# Patient Record
Sex: FEMALE | Race: WHITE | Employment: FULL TIME | URBAN - METROPOLITAN AREA
[De-identification: names, ages, dates, MRNs, and addresses within clinical notes are randomized per-mention and may not be internally consistent; named-entity substitution may affect disease eponyms.]

---

## 2017-02-14 ENCOUNTER — TELEPHONE (OUTPATIENT)
Dept: FAMILY MEDICINE | Facility: CLINIC | Age: 47
End: 2017-02-14

## 2017-02-14 NOTE — TELEPHONE ENCOUNTER
[facility] called asking if levothyroxine prescription was intentional or if still required DENIS.     Prescription sent in for Synthroid 12/1. Looks like request for lower cost generic sent in 12/15 and prescription for levothyroxine sent in on 12/16/2016.    Spoke with patient to confirm request for generic and no longer needs brand name.    Spoke with pharmacy and confirmed prescription sent 12/16/2016 to be current order.    dc'd Synthroid from med list.    Eda Sparks RN

## 2017-04-28 ENCOUNTER — TRANSFERRED RECORDS (OUTPATIENT)
Dept: HEALTH INFORMATION MANAGEMENT | Facility: CLINIC | Age: 47
End: 2017-04-28

## 2017-04-28 LAB
CHOLEST SERPL-MCNC: 206 MG/DL (ref 125–199)
GLUCOSE SERPL-MCNC: 87 MG/DL (ref 65–99)
HBA1C MFR BLD: 5 % (ref 4–5.6)
HDLC SERPL-MCNC: 73 MG/DL
LDLC SERPL CALC-MCNC: 122 MG/DL
TRIGL SERPL-MCNC: 56 MG/DL

## 2017-05-01 ENCOUNTER — OFFICE VISIT (OUTPATIENT)
Dept: ENDOCRINOLOGY | Facility: CLINIC | Age: 47
End: 2017-05-01
Payer: COMMERCIAL

## 2017-05-01 VITALS
SYSTOLIC BLOOD PRESSURE: 120 MMHG | BODY MASS INDEX: 29.63 KG/M2 | RESPIRATION RATE: 14 BRPM | WEIGHT: 162 LBS | TEMPERATURE: 98.7 F | HEART RATE: 85 BPM | DIASTOLIC BLOOD PRESSURE: 80 MMHG

## 2017-05-01 DIAGNOSIS — E06.3 HYPOTHYROIDISM DUE TO HASHIMOTO'S THYROIDITIS: ICD-10-CM

## 2017-05-01 DIAGNOSIS — Z13.6 CARDIOVASCULAR SCREENING; LDL GOAL LESS THAN 160: ICD-10-CM

## 2017-05-01 DIAGNOSIS — Z83.3 FAMILY HISTORY OF DIABETES MELLITUS: ICD-10-CM

## 2017-05-01 PROCEDURE — 99214 OFFICE O/P EST MOD 30 MIN: CPT | Performed by: CLINICAL NURSE SPECIALIST

## 2017-05-01 RX ORDER — DIPHENOXYLATE HYDROCHLORIDE AND ATROPINE SULFATE 2.5; .025 MG/1; MG/1
1 TABLET ORAL DAILY
COMMUNITY
Start: 2015-02-17 | End: 2022-04-22

## 2017-05-01 RX ORDER — PHENTERMINE HYDROCHLORIDE 37.5 MG/1
37.5 TABLET ORAL
Qty: 31 TABLET | Refills: 2 | Status: ON HOLD | OUTPATIENT
Start: 2017-05-01 | End: 2017-11-03

## 2017-05-01 NOTE — PROGRESS NOTES
Name: Sha Vidal  Seen at the request of Anita Quiros for hypothyroidism and obesity (Last seen 12/2/2016).  HPI:  Sha Vidal is a 47 year old female who presents for the follow up of hypothyroidism and weight gain/difficulty with weight loss.  She was diagnosed with Hashimoto's 15+ years ago.  Has been treated with low dose Levothyroxine since that time.  Prior to this, she weighed 120 lbs.  She started gaining weight at the time of diagnosis and has continued to gain even with diet efforts.  She was previously seen at [facility] until her insurance coverage changed.  Briefly tried Fenphen many years ago - only took for 3 months and tolerated it well, ? Weight loss while taking it.    Started Phentermine 8/4/2016.    Menses: regular, 3 children - no problems conceiving or with pregnancy/delivery.  Second marriage was unable to conceive - this was just prior to diagnosis of Hashimoto's so thought it was due to the thyroid condition.  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  Use of Steroids:No  Family history of Obesity:Yes: mother  Diet: Yes, watching/limiting intake  Exercise:Yes: walking every other day, takes the dogs out   History of low vitamin D - takes MVI in the summer, adds supplemental D 1000 IU daily in the winter  PMH/PSH:  Past Medical History:   Diagnosis Date     Allergic rhinitis, cause unspecified      CARDIOVASCULAR SCREENING; LDL GOAL LESS THAN 160 10/31/2010     Hashimoto's thyroiditis      Lateral epicondylitis 3/13/2009     Migraine headache 2/19/2013     NONSPECIFIC MEDICAL HISTORY 2002    Factor XII deficiency (asymptomatic)     NONSPECIFIC MEDICAL HISTORY 2002    GRAYSON positive 1:320, seen by Rheum (negative PHUONG eval)     Past Surgical History:   Procedure Laterality Date     C NONSPECIFIC  PROCEDURE      wisdom teeth extraction     C NONSPECIFIC PROCEDURE      Bilateral tubal ligation     C NONSPECIFIC PROCEDURE  1/01    Lasik     C NONSPECIFIC PROCEDURE      explo lap for ARMANDO for SBO     Family Hx:  Family History   Problem Relation Age of Onset     DIABETES Father      type 2     Thyroid disease: No         DM2: Yes: dad         Autoimmune: DM1, SLE, RA, Vitiligo Yes: type 1 diabetes, MGM    Social Hx:  Social History     Social History     Marital status:      Spouse name: N/A     Number of children: N/A     Years of education: N/A     Occupational History     Not on file.     Social History Main Topics     Smoking status: Never Smoker     Smokeless tobacco: Never Used     Alcohol use 0.0 oz/week     0 Standard drinks or equivalent per week      Comment: occasionally     Drug use: No     Sexual activity: Yes     Partners: Male     Other Topics Concern     Parent/Sibling W/ Cabg, Mi Or Angioplasty Before 65f 55m? No      Service No     Blood Transfusions No     Caffeine Concern No     Occupational Exposure No     Hobby Hazards No     Sleep Concern Yes     Stress Concern No     Weight Concern Yes     Special Diet No     Back Care No     Exercise No     Bike Helmet No     Seat Belt No     Self-Exams No     Social History Narrative          MEDICATIONS:  has a current medication list which includes the following prescription(s): cholecalciferol, multi-vitamins, levothyroxine, phentermine, fluoxetine, and sumatriptan.    ROS     Review Of Systems  GENERAL: + intentional weight loss.  No fevers, chills, or night sweats. + fatigue  HEENT: no dysphagia, odynophagia, diplopia, neck pain or tenderness  CV: no chest pain, pressure, palpitations, skipped beats, LOC  LUNGS: no SOB, [place], cough, sputum production, wheezing   ABDOMEN: no diarrhea, + constipation, no abdominal pain  EXTREMITIES: + rashes-come and go, mainly on legs, no ulcers, no edema  NEUROLOGY: no changes in vision,  tingling or numbness in hands or feet.   MSK: no muscle aches or pains, weakness  SKIN: + rashes no lesions  ENDOCRINE: no heat or cold intolerance      Physical Exam   VS: /80 (BP Location: Left arm, Patient Position: Chair, Cuff Size: Adult Regular)  Pulse 85  Temp 98.7  F (37.1  C) (Oral)  Resp 14  Wt 73.5 kg (162 lb)  BMI 29.63 kg/m2  GENERAL: NAD, well dressed, answering questions appropriately, appears stated age.  HEENT: OP clear, no exopthalmous, no proptosis, EOMI, no lig lag, no retraction, no scleral icterus  RESPIRATORY: Normal respiratory effort.  CARDIOVASCULAR: RRR.  No peripheral edema.  EXTREMITIES: no edema, +pulses, no rashes, no lesions  NEUROLOGY: CN grossly intact, no tremors  MSK: grossly intact, No digital cyanosis. Normal gait and station.  SKIN: no rashes, no lesions, no ulcers  PSYCH: Intact judgment and insight. A&OX3 with a cordial affect.      LABS:  !COMPREHENSIVE Latest Ref Rng 7/14/2015 7/29/2016   SODIUM 133 - 144 mmol/L 139 138   POTASSIUM 3.4 - 5.3 mmol/L 4.0 3.9   CHLORIDE 94 - 109 mmol/L 105 105   BUN 7 - 30 mg/dL 14 10   Creatinine 0.52 - 1.04 mg/dL 0.75 0.76   Glucose 70 - 99 mg/dL 94 83   ANION GAP 3 - 14 mmol/L 8 9   CALCIUM 8.5 - 10.1 mg/dL 8.9 8.6   ALBUMIN 3.4 - 5.0 g/dL 3.9 3.6     !LIPID/HEPATIC Latest Ref Rng 7/29/2016   CHOLESTEROL <200 mg/dL 194   TRIGLYCERIDES <150 mg/dL 85   HDL CHOLESTEROL >49 mg/dL 66   LDL CHOLESTEROL, CALCULATED <100 mg/dL 111 (H)   VLDL-CHOLESTEROL 0 - 30 mg/dL    NON HDL CHOLESTEROL <130 mg/dL 128   CHOLESTEROL/HDL RATIO 0.0 - 5.0    AST 0 - 45 U/L 19   ALT 0 - 50 U/L 15     !THYROID Latest Ref Rng 7/29/2016 7/14/2015 9/25/2014 6/23/2014   TSH 0.40 - 4.00 mU/L 1.45 1.52 1.37 2.73     Component    Latest Ref Rng 8/4/2016   Insulin     5   Glucose    70 - 99 mg/dL 91   C-Peptide    0.9 - 6.9 ng/mL 1.3   Hemoglobin A1C  4.3 - 6.0 % 5.0   Vitamin D Deficiency screening    20 - 75 ug/L 36     Component    Latest Ref Rng 8/8/2016  "  Cortisol Saliva     0.034     Vital Signs 7/29/2016 8/4/2016 8/29/2016   Weight (LB) 183 lb 1 oz 180 lb 172 lb   Height 5' 2\"     BMI 33.55       Vital Signs 9/4/2016 12/2/2016   Weight (LB) 165 lb 166 lb   Height 5' 2\"    BMI 30.24      Vital Signs 5/1/2017   Weight (LB) 162 lb     Waist Circumference:  37.25\" (today).  All pertinent notes, labs, and images personally reviewed by me.     A/P  Ms.Michele NIKOLAS Vidal is a 47 year old here for the evaluation of:    1. Hypothyroidism/Hashimoto's.  Currently treated with Levothyroxine 25 mcg daily.  Recent TSH was in target range.  Continue current dose of Levothyroxine.    2. Obesity-  Obesity is associated with a significant increase in mortality and risk of many disorders, including diabetes mellitus, hypertension, dyslipidemia, heart disease, stroke, sleep apnea, cancer, and many others. Conversely, weight loss is associated with a reduction in obesity-associated morbidity.    Endocrine evaluation of obesity includes Diabetes/prediabetes, Cushing's and thyroid dysfunction.  The relevant work up for the diagnosis and management of obesity and various treatment options were discussed. Body Mass Index (BMI) has been a standard means for calculating risk for overweight and obesity. The new American Association of Clinical Endocrinology (AACE) algorithm recommends lifestyle modifications as the initial phase of treatment for all patients with the BMI equal or greater than 25 kg/m2. Lifestyle modifications includes use of medical nutrition therapy, exercise, tobacco cessation, adequate quality and quantity of sleep, limited consumption of alcohol and reduced stress through implementation of a structured, multidisciplinary program.    In patients with complications associated with obesity, graded interventions are recommended including pharmacological therapy such as phentermine, orlistat, lorcaserin and phentermine/topiramate ER, contrave ( buproprion/naltreone) and the " use of very low calorie meal replacement programs.    If medical intervention is insufficient, surgical therapy may be considered, especially in patients with BMI greater than or equal to 35 kg/m2 with multiple complications. Surgical treatments include lap-band, gastric sleeve or gastric bypass surgery.    11pm salivary cortisol nml..    Fasting  Glucose 91, A1c 5.0%, C-peptide and insulin were not elevated.    EKG obtained and read in clinic - WNL's.  Started Phentermine 37.5 mg-8/4/2016.  Has lost 183--> 162 lbs since starting Phentermine + diet and exercise efforts.  Her weight loss goal is 130; weight to achieve a BMI of 25 = 136 lbs.  Continue Phentermine 37.5 mg qd.    Labs ordered today:   No orders of the defined types were placed in this encounter.    Radiology/Consults ordered today: None    More than 50% of the time spent with Ms. Vidal on counseling / coordinating her care.  Total face to face time was greater than or equal to 25 minutes.      Follow-up:  follow up in 3 month(s)    Louise Magallanes NP  Endocrinology  [facility]  CC: Anita Quiros   ____________________________________________________________

## 2017-05-01 NOTE — MR AVS SNAPSHOT
"              After Visit Summary   5/1/2017    Sha Vidal    MRN: [id number]           Patient Information     Date Of Birth          1970        Visit Information        Provider Department      5/1/2017 3:30 PM Louise Magallanes APRN CNP [facility]        Today's Diagnoses     Hypothyroidism due to Hashimoto's thyroiditis        CARDIOVASCULAR SCREENING; LDL GOAL LESS THAN 160        Family history of diabetes mellitus           Follow-ups after your visit        Follow-up notes from your care team     Return in about 3 months (around 8/1/2017).      Who to contact     If you have questions or need follow up information about today's clinic visit or your schedule please contact [facility] directly at [phone].  Normal or non-critical lab and imaging results will be communicated to you by [employer], letter or phone within 4 business days after the clinic has received the results. If you do not hear from us within 7 days, please contact the clinic through [employer] or phone. If you have a critical or abnormal lab result, we will notify you by phone as soon as possible.  Submit refill requests through [employer] or call your pharmacy and they will forward the refill request to us. Please allow 3 business days for your refill to be completed.          Additional Information About Your Visit        MyChart Information     [employer] lets you send messages to your doctor, view your test results, renew your prescriptions, schedule appointments and more. To sign up, go to [email or web address] . Click on \"Log in\" on the left side of the screen, which will take you to the Welcome page. Then click on \"Sign up Now\" on the right side of the page.     You will be asked to enter the access code listed below, as well as some personal information. Please follow the directions to create your username and password.     Your access code is: [email or web address]  Expires: 7/31/2017  5:13 AM   "   Your access code will  in 90 days. If you need help or a new code, please call your [facility] clinic or [phone].        Care EveryWhere ID     This is your Care EveryWhere ID. This could be used by other organizations to access your [facility] medical records  [id number]        Your Vitals Were     Pulse Temperature Respirations BMI (Body Mass Index)          85 98.7  F (37.1  C) (Oral) 14 29.63 kg/m2         Blood Pressure from Last 3 Encounters:   17 120/80   16 120/76   16 118/70    Weight from Last 3 Encounters:   17 73.5 kg (162 lb)   16 75.3 kg (166 lb)   16 74.8 kg (165 lb)              Today, you had the following     No orders found for display         Where to get your medicines      Some of these will need a paper prescription and others can be bought over the counter.  Ask your nurse if you have questions.     Bring a paper prescription for each of these medications     phentermine 37.5 MG tablet          Primary Care Provider Office Phone # Fax #    Anita Quiros PA-C [phone]       [facility] [address] [address] [facility] [address]        Thank you!     Thank you for choosing [facility]  for your care. Our goal is always to provide you with excellent care. Hearing back from our patients is one way we can continue to improve our services. Please take a few minutes to complete the written survey that you may receive in the mail after your visit with us. Thank you!             Your Updated Medication List - Protect others around you: Learn how to safely use, store and throw away your medicines at www.disposemymeds.org.          This list is accurate as of: 17 11:59 PM.  Always use your most recent med list.                   Brand Name Dispense Instructions for use    D  2000 UNITS tablet   Generic drug:  cholecalciferol      Take 2,000 Units by mouth       FLUoxetine 10 MG capsule     PROzac    90 capsule    Take 1 capsule (10 mg) by mouth daily       levothyroxine 25 MCG tablet    SYNTHROID/LEVOTHROID    90 tablet    Take 1 tablet (25 mcg) by mouth daily       MULTI-VITAMINS Tabs      Take 1 tablet by mouth       phentermine 37.5 MG tablet    ADIPEX-P    31 tablet    Take 1 tablet (37.5 mg) by mouth every morning (before breakfast)       SUMAtriptan 100 MG tablet    IMITREX    9 tablet    Take 1 tablet (100 mg) by mouth at onset of headache for migraine May repeat in 2 hours if needed: max 2/day; average number of headaches  2 yearly

## 2017-05-01 NOTE — NURSING NOTE
"Chief Complaint   Patient presents with     Weight Loss     Thyroid Problem       Initial /80 (BP Location: Left arm, Patient Position: Chair, Cuff Size: Adult Regular)  Pulse 85  Temp 98.7  F (37.1  C) (Oral)  Resp 14  Wt 162 lb (73.5 kg)  BMI 29.63 kg/m2 Estimated body mass index is 29.63 kg/(m^2) as calculated from the following:    Height as of 9/4/16: 5' 2\" (1.575 m).    Weight as of this encounter: 162 lb (73.5 kg).  Medication Reconciliation: complete    Waist:  37.25\"    "

## 2017-06-28 DIAGNOSIS — F43.23 ADJUSTMENT DISORDER WITH MIXED ANXIETY AND DEPRESSED MOOD: ICD-10-CM

## 2017-06-28 NOTE — LETTER
[facility]  [address] [address] [address]  Phone: [phone]        July 7, 2017      Sha Vidal                                                                                                                                [address]  [place] [address]            Dear Ms. Vidal,    We are concerned about your health care.  We recently provided you with a medication refill.  Many medications require routine follow-up with your Doctor.      At this time we ask that: You schedule a routine office visit with your physician to follow your annual physical and medication review.    Your prescription: No further refills will be given until your follow up care is completed.      Thank you,      Anita Quiros/  [place], [facility] RN

## 2017-06-30 RX ORDER — FLUOXETINE 10 MG/1
CAPSULE ORAL
Qty: 30 CAPSULE | Refills: 0 | Status: SHIPPED | OUTPATIENT
Start: 2017-06-30 | End: 2017-07-17

## 2017-06-30 NOTE — TELEPHONE ENCOUNTER
prozac     Last Written Prescription Date: 6/19/17  Last Fill Quantity: 30, # refills: 0  Last Office Visit with [facility] primary care provider:  7/29/16        Last PHQ-9 score on record=   PHQ-9 SCORE 7/29/2016   Total Score -   Total Score 2       Routing refill request to provider for review/approval because:  Patient needs to be seen because:  Will be due for a f/u in July 2017 and is due for an updated phq9  Had been notified, was going to call back, has not yet.      Will forward to the station, please help the pt schedule a f/u appt and also update her phq9.  Thanks!

## 2017-06-30 NOTE — TELEPHONE ENCOUNTER
30 day supply approved, needs office visit before any further refills will be approved. Anita Quiros PA-C

## 2017-07-09 ENCOUNTER — HOSPITAL ENCOUNTER (EMERGENCY)
Facility: CLINIC | Age: 47
Discharge: HOME | End: 2017-07-09
Attending: EMERGENCY MEDICINE | Admitting: EMERGENCY MEDICINE
Payer: COMMERCIAL

## 2017-07-09 ENCOUNTER — APPOINTMENT (OUTPATIENT)
Dept: CT IMAGING | Facility: CLINIC | Age: 47
End: 2017-07-09
Attending: EMERGENCY MEDICINE
Payer: COMMERCIAL

## 2017-07-09 VITALS
HEIGHT: 62 IN | RESPIRATION RATE: 16 BRPM | DIASTOLIC BLOOD PRESSURE: 82 MMHG | OXYGEN SATURATION: 99 % | HEART RATE: 82 BPM | SYSTOLIC BLOOD PRESSURE: 120 MMHG | TEMPERATURE: 98.4 F | BODY MASS INDEX: 28.52 KG/M2 | WEIGHT: 155 LBS

## 2017-07-09 DIAGNOSIS — K43.9 SUPRAUMBILICAL HERNIA: ICD-10-CM

## 2017-07-09 DIAGNOSIS — K46.0 INCARCERATED HERNIA: ICD-10-CM

## 2017-07-09 LAB
ALBUMIN SERPL-MCNC: 3.7 G/DL (ref 3.4–5)
ALP SERPL-CCNC: 63 U/L (ref 40–150)
ALT SERPL W P-5'-P-CCNC: 23 U/L (ref 0–50)
ANION GAP SERPL CALCULATED.3IONS-SCNC: 5 MMOL/L (ref 3–14)
AST SERPL W P-5'-P-CCNC: 21 U/L (ref 0–45)
BASOPHILS # BLD AUTO: 0 10E9/L (ref 0–0.2)
BASOPHILS NFR BLD AUTO: 0 %
BILIRUB SERPL-MCNC: 0.3 MG/DL (ref 0.2–1.3)
BUN SERPL-MCNC: 16 MG/DL (ref 7–30)
CALCIUM SERPL-MCNC: 8.5 MG/DL (ref 8.5–10.1)
CHLORIDE SERPL-SCNC: 104 MMOL/L (ref 94–109)
CO2 SERPL-SCNC: 28 MMOL/L (ref 20–32)
CREAT SERPL-MCNC: 0.9 MG/DL (ref 0.52–1.04)
DIFFERENTIAL METHOD BLD: ABNORMAL
EOSINOPHIL # BLD AUTO: 0.2 10E9/L (ref 0–0.7)
EOSINOPHIL NFR BLD AUTO: 2.3 %
ERYTHROCYTE [DISTWIDTH] IN BLOOD BY AUTOMATED COUNT: 14.9 % (ref 10–15)
GFR SERPL CREATININE-BSD FRML MDRD: 67 ML/MIN/1.7M2
GLUCOSE SERPL-MCNC: 177 MG/DL (ref 70–99)
HCT VFR BLD AUTO: 35.3 % (ref 35–47)
HGB BLD-MCNC: 11.3 G/DL (ref 11.7–15.7)
IMM GRANULOCYTES # BLD: 0 10E9/L (ref 0–0.4)
IMM GRANULOCYTES NFR BLD: 0.2 %
LACTATE BLD-SCNC: 1.5 MMOL/L (ref 0.7–2.1)
LYMPHOCYTES # BLD AUTO: 1 10E9/L (ref 0.8–5.3)
LYMPHOCYTES NFR BLD AUTO: 11.1 %
MCH RBC QN AUTO: 26.4 PG (ref 26.5–33)
MCHC RBC AUTO-ENTMCNC: 32 G/DL (ref 31.5–36.5)
MCV RBC AUTO: 83 FL (ref 78–100)
MONOCYTES # BLD AUTO: 0.5 10E9/L (ref 0–1.3)
MONOCYTES NFR BLD AUTO: 4.9 %
NEUTROPHILS # BLD AUTO: 7.5 10E9/L (ref 1.6–8.3)
NEUTROPHILS NFR BLD AUTO: 81.5 %
NRBC # BLD AUTO: 0 10*3/UL
NRBC BLD AUTO-RTO: 0 /100
PLATELET # BLD AUTO: 320 10E9/L (ref 150–450)
POTASSIUM SERPL-SCNC: 3.7 MMOL/L (ref 3.4–5.3)
PROT SERPL-MCNC: 7.2 G/DL (ref 6.8–8.8)
RBC # BLD AUTO: 4.28 10E12/L (ref 3.8–5.2)
SODIUM SERPL-SCNC: 137 MMOL/L (ref 133–144)
WBC # BLD AUTO: 9.2 10E9/L (ref 4–11)

## 2017-07-09 PROCEDURE — 83605 ASSAY OF LACTIC ACID: CPT | Performed by: EMERGENCY MEDICINE

## 2017-07-09 PROCEDURE — 25000128 H RX IP 250 OP 636: Performed by: EMERGENCY MEDICINE

## 2017-07-09 PROCEDURE — 80053 COMPREHEN METABOLIC PANEL: CPT | Performed by: EMERGENCY MEDICINE

## 2017-07-09 PROCEDURE — 85025 COMPLETE CBC W/AUTO DIFF WBC: CPT | Performed by: EMERGENCY MEDICINE

## 2017-07-09 PROCEDURE — 99285 EMERGENCY DEPT VISIT HI MDM: CPT | Mod: 25

## 2017-07-09 PROCEDURE — 74177 CT ABD & PELVIS W/CONTRAST: CPT

## 2017-07-09 RX ORDER — IOPAMIDOL 755 MG/ML
500 INJECTION, SOLUTION INTRAVASCULAR ONCE
Status: COMPLETED | OUTPATIENT
Start: 2017-07-09 | End: 2017-07-09

## 2017-07-09 RX ORDER — CETIRIZINE HYDROCHLORIDE 10 MG/1
10 TABLET ORAL DAILY
COMMUNITY
End: 2022-04-22

## 2017-07-09 RX ORDER — HYDROCODONE BITARTRATE AND ACETAMINOPHEN 5; 325 MG/1; MG/1
1-2 TABLET ORAL EVERY 6 HOURS PRN
Qty: 20 TABLET | Refills: 0 | Status: SHIPPED | OUTPATIENT
Start: 2017-07-09 | End: 2017-07-17

## 2017-07-09 RX ADMIN — IOPAMIDOL 78 ML: 755 INJECTION, SOLUTION INTRAVENOUS at 19:54

## 2017-07-09 RX ADMIN — SODIUM CHLORIDE 59 ML: 9 INJECTION, SOLUTION INTRAVENOUS at 19:54

## 2017-07-09 ASSESSMENT — ENCOUNTER SYMPTOMS
ABDOMINAL PAIN: 1
VOMITING: 0
NAUSEA: 1

## 2017-07-09 NOTE — ED AVS SNAPSHOT
[facility]    [address]    [facility] [address]    Phone:  [phone]    Fax:  [phone]                                       Sha Vidal   MRN: [id number]    Department:  [facility]   Date of Visit:  7/9/2017           After Visit Summary Signature Page     I have received my discharge instructions, and my questions have been answered. I have discussed any challenges I see with this plan with the nurse or doctor.    ..........................................................................................................................................  Patient/Patient Representative Signature      ..........................................................................................................................................  Patient Representative Print Name and Relationship to Patient    ..................................................               ................................................  Date                                            Time    ..........................................................................................................................................  Reviewed by Signature/Title    ...................................................              ..............................................  Date                                                            Time

## 2017-07-09 NOTE — ED AVS SNAPSHOT
[facility]    [address]    [facility] [address]    Phone:  [phone]    Fax:  [phone]                                       Sha Vidal   MRN: [id number]    Department:  [facility]   Date of Visit:  7/9/2017           Patient Information     Date Of Birth          1970        Your diagnoses for this visit were:     Supraumbilical hernia     Incarcerated hernia        You were seen by Laura Petersen MD.      Follow-up Information     Follow up with [facility].    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  [facility] [address]  [phone]          Follow up with [facility].    Specialty:  EMERGENCY MEDICINE    Why:  If symptoms worsen    Contact information:    [address]  [facility] [id number] [phone]      Discharge References/Attachments     HERNIA (ADULT) (ENGLISH)      24 Hour Appointment Hotline       To make an appointment at any [facility], call [phone] ([phone]). If you don't have a family doctor or clinic, we will help you find one. [facility] are conveniently located to serve the needs of you and your family.             Review of your medicines      START taking        Dose / Directions Last dose taken    HYDROcodone-acetaminophen 5-325 MG per tablet   Commonly known as:  NORCO   Dose:  1-2 tablet   Quantity:  20 tablet        Take 1-2 tablets by mouth every 6 hours as needed for moderate to severe pain   Refills:  0          Our records show that you are taking the medicines listed below. If these are incorrect, please call your family doctor or clinic.        Dose / Directions Last dose taken    cetirizine 10 MG tablet   Commonly known as:  zyrTEC   Dose:  10 mg        Take 10 mg by mouth daily   Refills:  0        D 2000 2000 UNITS tablet    Dose:  2000 Units   Generic drug:  cholecalciferol        Take 2,000 Units by mouth   Refills:  0        FLUoxetine 10 MG capsule   Commonly known as:  PROzac   Quantity:  30 capsule        TAKE 1 CAPSULE BY MOUTH  DAILY DUE FOR AN ANNUAL  VISIT IN JULY. PLEASE CALL  CLINIC TO SCHEDULE.   Refills:  0        levothyroxine 25 MCG tablet   Commonly known as:  SYNTHROID/LEVOTHROID   Dose:  25 mcg   Quantity:  90 tablet        Take 1 tablet (25 mcg) by mouth daily   Refills:  3        MULTI-VITAMINS Tabs   Dose:  1 tablet        Take 1 tablet by mouth   Refills:  0        phentermine 37.5 MG tablet   Commonly known as:  ADIPEX-P   Dose:  37.5 mg   Quantity:  31 tablet        Take 1 tablet (37.5 mg) by mouth every morning (before breakfast)   Refills:  2        SUMAtriptan 100 MG tablet   Commonly known as:  IMITREX   Dose:  100 mg   Quantity:  9 tablet        Take 1 tablet (100 mg) by mouth at onset of headache for migraine May repeat in 2 hours if needed: max 2/day; average number of headaches  2 yearly   Refills:  1                Prescriptions were sent or printed at these locations (1 Prescription)                   Other Prescriptions                Printed at Department/Unit printer (1 of 1)         HYDROcodone-acetaminophen (NORCO) 5-325 MG per tablet                Procedures and tests performed during your visit     CBC with platelets differential    CT Abdomen Pelvis w Contrast    Comprehensive metabolic panel    Lactic acid whole blood      Orders Needing Specimen Collection     None      Pending Results     No orders found from 7/7/2017 to 7/10/2017.            Pending Culture Results     No orders found from 7/7/2017 to 7/10/2017.            Pending Results Instructions     If you had any lab results that were not finalized at the time of your Discharge, you can call the ED Lab Result RN at [phone]. You will be contacted by this team for any positive Lab results or changes in treatment. The nurses are  available 7 days a week from 10A to 6:30P.  You can leave a message 24 hours per day and they will return your call.        Test Results From Your Hospital Stay        7/9/2017  7:49 PM      Component Results     Component Value Ref Range & Units Status    WBC 9.2 4.0 - 11.0 10e9/L Final    RBC Count 4.28 3.8 - 5.2 10e12/L Final    Hemoglobin 11.3 (L) 11.7 - 15.7 g/dL Final    Hematocrit 35.3 35.0 - 47.0 % Final    MCV 83 78 - 100 fl Final    MCH 26.4 (L) 26.5 - 33.0 pg Final    MCHC 32.0 31.5 - 36.5 g/dL Final    RDW 14.9 10.0 - 15.0 % Final    Platelet Count 320 150 - 450 10e9/L Final    Diff Method Automated Method  Final    % Neutrophils 81.5 % Final    % Lymphocytes 11.1 % Final    % Monocytes 4.9 % Final    % Eosinophils 2.3 % Final    % Basophils 0.0 % Final    % Immature Granulocytes 0.2 % Final    Nucleated RBCs 0 0 /100 Final    Absolute Neutrophil 7.5 1.6 - 8.3 10e9/L Final    Absolute Lymphocytes 1.0 0.8 - 5.3 10e9/L Final    Absolute Monocytes 0.5 0.0 - 1.3 10e9/L Final    Absolute Eosinophils 0.2 0.0 - 0.7 10e9/L Final    Absolute Basophils 0.0 0.0 - 0.2 10e9/L Final    Abs Immature Granulocytes 0.0 0 - 0.4 10e9/L Final    Absolute Nucleated RBC 0.0  Final         7/9/2017  8:06 PM      Component Results     Component Value Ref Range & Units Status    Sodium 137 133 - 144 mmol/L Final    Potassium 3.7 3.4 - 5.3 mmol/L Final    Chloride 104 94 - 109 mmol/L Final    Carbon Dioxide 28 20 - 32 mmol/L Final    Anion Gap 5 3 - 14 mmol/L Final    Glucose 177 (H) 70 - 99 mg/dL Final    Urea Nitrogen 16 7 - 30 mg/dL Final    Creatinine 0.90 0.52 - 1.04 mg/dL Final    GFR Estimate 67 >60 mL/min/1.7m2 Final    Non  GFR Calc    GFR Estimate If Black 81 >60 mL/min/1.7m2 Final    African American GFR Calc    Calcium 8.5 8.5 - 10.1 mg/dL Final    Bilirubin Total 0.3 0.2 - 1.3 mg/dL Final    Albumin 3.7 3.4 - 5.0 g/dL Final    Protein Total 7.2 6.8 - 8.8 g/dL Final    Alkaline Phosphatase 63 40 - 150  U/L Final    ALT 23 0 - 50 U/L Final    AST 21 0 - 45 U/L Final         7/9/2017  7:56 PM      Component Results     Component Value Ref Range & Units Status    Lactic Acid 1.5 0.7 - 2.1 mmol/L Final         7/9/2017  8:39 PM      Narrative     CT ABDOMEN PELVIS WITH CONTRAST 7/9/2017 8:01 PM    TECHNIQUE: Images from diaphragm to pubic symphysis 78 mL Isovue-370  IV contrast. Radiation dose for this scan was reduced using automated  exposure control, adjustment of the mA and/or kV according to patient  size, or iterative reconstruction technique.    HISTORY: Midline pain above umbilicus, history of surgery for bowel  obstruction with incision at area of pain.    COMPARISON: 10/12/2007 CT abdomen and pelvis.     FINDINGS:   Abdomen and Pelvis: There is a midline fat-containing supraumbilical  hernia new since prior CT. Very small amount of fluid is identified  within this hernia which could represent some mild incarceration of  the hernia. The herniated fat is approximately 4.7 cm transverse, 2.2  cm AP and 4 cm craniocaudal dimension and identified on series 2  images 35 through 42.    The lung bases are clear. No worrisome focal liver lesions,  normal-appearing gallbladder, spleen, pancreas, adrenal glands and  kidneys. No periaortic or pelvic adenopathy. No free fluid. Fibroid  uterus with 5 cm right-sided fibroid. The bowel appears normal as seen  with CT technique. Appendix is not confidently identified.        Impression     IMPRESSION:   1. Midline supraumbilical hernia containing fat. Small amount of fluid  in the hernia suggests possible hernia incarceration.  2. No other acute-appearing abnormality.  3. Fibroid uterus.     RIO CHENEY MD                Clinical Quality Measure: Blood Pressure Screening     Your blood pressure was checked while you were in the emergency department today. The last reading we obtained was  BP: 135/83 . Please read the guidelines below about what these numbers mean and  "what you should do about them.  If your systolic blood pressure (the top number) is less than 120 and your diastolic blood pressure (the bottom number) is less than 80, then your blood pressure is normal. There is nothing more that you need to do about it.  If your systolic blood pressure (the top number) is 120-139 or your diastolic blood pressure (the bottom number) is 80-89, your blood pressure may be higher than it should be. You should have your blood pressure rechecked within a year by a primary care provider.  If your systolic blood pressure (the top number) is 140 or greater or your diastolic blood pressure (the bottom number) is 90 or greater, you may have high blood pressure. High blood pressure is treatable, but if left untreated over time it can put you at risk for heart attack, stroke, or kidney failure. You should have your blood pressure rechecked by a primary care provider within the next 4 weeks.  If your provider in the emergency department today gave you specific instructions to follow-up with your doctor or provider even sooner than that, you should follow that instruction and not wait for up to 4 weeks for your follow-up visit.        Thank you for choosing [facility]       Thank you for choosing [facility] for your care. Our goal is always to provide you with excellent care. Hearing back from our patients is one way we can continue to improve our services. Please take a few minutes to complete the written survey that you may receive in the mail after you visit with us. Thank you!        [employer] Information     [facility] lets you send messages to your doctor, view your test results, renew your prescriptions, schedule appointments and more. To sign up, go to [email or web address] . Click on \"Log in\" on the left side of the screen, which will take you to the Welcome page. Then click on \"Sign up Now\" on the right side of the page.     You will be asked to enter the access code listed below, as well as " some personal information. Please follow the directions to create your username and password.     Your access code is: [email or web address]  Expires: 2017  5:13 AM     Your access code will  in 90 days. If you need help or a new code, please call your [facility] clinic or [phone].        Care EveryWhere ID     This is your Care EveryWhere ID. This could be used by other organizations to access your [facility] medical records  [id number]        Equal Access to Services     [facility]: Hadhe de jesuso Soluci, waaxda luqadaha, qaybta kaalmada adeharoonyamaria elena, morales bland . So [facility] [phone].    ATENCIÓN: Si habla español, tiene a collins disposición servicios gratuitos de asistencia lingüística. Llame al [phone].    We comply with applicable federal civil rights laws and [place] laws. We do not discriminate on the basis of race, color, national origin, age, disability sex, sexual orientation or gender identity.            After Visit Summary       This is your record. Keep this with you and show to your community pharmacist(s) and doctor(s) at your next visit.

## 2017-07-09 NOTE — ED NOTES
Pt sent from [facility] for a possible hernia which they tried to reduce, but was not 100% successful, thoughts for potential need of surgery. Pt was given IM morphine at [facility]. Hernia location is above the umbilicus. Pt had a bowel obstruction 10 years ago, which needed emergency surgery and the lump is above the surgery site. Pain is currently a 2/10.

## 2017-07-10 NOTE — ED PROVIDER NOTES
"  History     Chief Complaint:  Abdominal Pain     HPI   Sha Vidal is a 47 year old female who presents for evaluation of abdominal pain. Today around 1700, the patient started to develop an abnormal abdominal mass near the site of her previous bowel resection with associated pain and nausea without vomiting. The patient was evaluated at [facility] regarding this. There, they felt that this was representative of a hernia and they were only somewhat successful at reducing the hernia. She was provided IM Morphine and referred to the ED for further evaluation due to concern that she may need emergent surgery. Currently in the ED, the patient rates her pain at a severity of 2/10, states now it just feels sore. She has never had similar pain in the past. She does not believe that she is pregnant.     Allergies:  NKDA     Medications:    cetirizine (ZYRTEC) 10 MG tablet  FLUoxetine (PROZAC) 10 MG capsule  cholecalciferol (D 2000) 2000 UNITS tablet  Multiple Vitamin (MULTI-VITAMINS) TABS  phentermine (ADIPEX-P) 37.5 MG tablet  levothyroxine (SYNTHROID/LEVOTHROID) 25 MCG tablet  SUMAtriptan (IMITREX) 100 MG tablet    Past Medical History:    Migraine headaches  Obesity  Hypothyroidism  Hashimoto's thyroiditis   Lateral epicondylitis     Past Surgical History:    [place] teeth extraction  Bilateral tubal ligation  Lasik  Exploratory lap for ARMANDO for SBO     Family History:    Diabetes mellitus, type I - Father     Social History:  Tobacco use:    Never smoker  Alcohol use:    Positive, occasionally   Marital status:       Accompanied to ED by:        Review of Systems   Gastrointestinal: Positive for abdominal pain and nausea. Negative for vomiting.        (+) Abdominal mass    All other systems reviewed and are negative.    Physical Exam   First Vitals:  BP: 135/83  Pulse: 82  Temp: 98.4  F (36.9  C)  Resp: 16  Height: 157.5 cm (5' 2\")  Weight: 70.3 kg (155 lb)  SpO2: 98 %      Physical " Exam  Eyes:  Sclera white; Pupils are equal and round  ENT:    External ears and nares normal  CV:  Regular rate and rhythm, No murmur   Resp:  Breath sounds clear and equal bilaterally    Non-labored, no retractions or accessory muscle use  GI:  Abdomen is soft, non-tender, non-distended, increased bowel sounds on left    Well healed midline incision superior to umbilicus, no palpable hernia    No rebound tenderness or peritoneal features  MS:  Moves all extremities  Skin:  Warm and dry  Neuro: Speech is normal and fluent. No apparent deficit.          Emergency Department Course     Imaging:  Radiographic findings were communicated with the patient who voiced understanding of the findings.    CT Abdomen Pelvis w Contrast:  IMPRESSION:   1. Midline supraumbilical hernia containing fat. Small amount of fluid in the hernia suggests possible hernia incarceration.  2. No other acute-appearing abnormality.  3. Fibroid uterus.  Per radiology.     Laboratory:  CBC: HGB 11.3 low, o/w WNL (WBC 9.2, )    CMP: Glucose 177 high, o/w WNL (Creatinine 0.90)   Lactic acid whole blood: 1.5     Emergency Department Course:  Nursing notes and vitals reviewed.  1925: I performed an exam of the patient as documented above.     2025: I updated and reassessed the patient.     2040: I spoke with Dr. Hall of the surgical service regarding patient's presentation, findings, and plan of care.    2045: I updated and reassessed the patient.     I personally reviewed the laboratory results with the Patient and answered all related questions prior to discharge.      Findings and plan explained to the Patient. Patient discharged home with instructions regarding supportive care, medications, and reasons to return. The importance of close follow-up was reviewed. The patient was prescribed Norco.      Impression & Plan      Medical Decision Making:  Sha Vidal is a 47 year old female who is here for evaluation from urgent care due to a  ventral midline hernia that they attempted to reduce. She has been feeling better but is not symptom free at this point, suggesting that she may have a persistent hernia. She has also been nauseas and not had any bowel movements and has increased bowel sounds concerning for an associated obstruction. CT scan was obtained and demonstrates no obstruction but concerns for an incarcerated fat containing hernia. This was discussed with general surgery. Her lactic acid is normal. Her symptoms are well-controlled.  Dr Hall recommended symptom control with plan for outpatient repair after the inflammation calms down. She will be following up in their clinic and returning immediately for any worsening symptoms.     Diagnosis:    ICD-10-CM   1. Supraumbilical hernia K43.9   2. Incarcerated hernia K46.0       Disposition:  Discharged to home with [facility].     Discharge Medications:  New Prescriptions    HYDROCODONE-ACETAMINOPHEN (NORCO) 5-325 MG PER TABLET    Take 1-2 tablets by mouth every 6 hours as needed for moderate to severe pain         Adithya JOHNSON, am serving as a scribe at 7:25 PM on 7/9/2017 to document services personally performed by Dr. Petersen, based on my observations and the provider's statements to me.    [facility] EMERGENCY DEPARTMENT       Laura Petersen MD  07/09/17 4421

## 2017-07-17 ENCOUNTER — OFFICE VISIT (OUTPATIENT)
Dept: FAMILY MEDICINE | Facility: CLINIC | Age: 47
End: 2017-07-17
Payer: COMMERCIAL

## 2017-07-17 VITALS
DIASTOLIC BLOOD PRESSURE: 74 MMHG | HEIGHT: 62 IN | BODY MASS INDEX: 29.26 KG/M2 | WEIGHT: 159 LBS | HEART RATE: 66 BPM | SYSTOLIC BLOOD PRESSURE: 104 MMHG | TEMPERATURE: 97.8 F

## 2017-07-17 DIAGNOSIS — D25.9 UTERINE LEIOMYOMA, UNSPECIFIED LOCATION: ICD-10-CM

## 2017-07-17 DIAGNOSIS — Z00.00 ROUTINE GENERAL MEDICAL EXAMINATION AT A HEALTH CARE FACILITY: Primary | ICD-10-CM

## 2017-07-17 DIAGNOSIS — K43.9 SUPRAUMBILICAL HERNIA: ICD-10-CM

## 2017-07-17 DIAGNOSIS — G43.809 OTHER MIGRAINE WITHOUT STATUS MIGRAINOSUS, NOT INTRACTABLE: ICD-10-CM

## 2017-07-17 DIAGNOSIS — Z12.39 SCREENING FOR BREAST CANCER: ICD-10-CM

## 2017-07-17 DIAGNOSIS — F43.23 ADJUSTMENT DISORDER WITH MIXED ANXIETY AND DEPRESSED MOOD: ICD-10-CM

## 2017-07-17 DIAGNOSIS — E03.9 HYPOTHYROIDISM, UNSPECIFIED TYPE: ICD-10-CM

## 2017-07-17 DIAGNOSIS — E66.3 OVERWEIGHT: ICD-10-CM

## 2017-07-17 PROBLEM — L80 VITILIGO: Status: ACTIVE | Noted: 2017-07-17

## 2017-07-17 LAB
ERYTHROCYTE [DISTWIDTH] IN BLOOD BY AUTOMATED COUNT: 14.4 % (ref 10–15)
HCT VFR BLD AUTO: 36.2 % (ref 35–47)
HGB BLD-MCNC: 11.7 G/DL (ref 11.7–15.7)
MCH RBC QN AUTO: 26.4 PG (ref 26.5–33)
MCHC RBC AUTO-ENTMCNC: 32.3 G/DL (ref 31.5–36.5)
MCV RBC AUTO: 82 FL (ref 78–100)
PLATELET # BLD AUTO: 340 10E9/L (ref 150–450)
RBC # BLD AUTO: 4.44 10E12/L (ref 3.8–5.2)
WBC # BLD AUTO: 7 10E9/L (ref 4–11)

## 2017-07-17 PROCEDURE — 80053 COMPREHEN METABOLIC PANEL: CPT | Performed by: PHYSICIAN ASSISTANT

## 2017-07-17 PROCEDURE — 99396 PREV VISIT EST AGE 40-64: CPT | Performed by: PHYSICIAN ASSISTANT

## 2017-07-17 PROCEDURE — 82306 VITAMIN D 25 HYDROXY: CPT | Performed by: PHYSICIAN ASSISTANT

## 2017-07-17 PROCEDURE — 99213 OFFICE O/P EST LOW 20 MIN: CPT | Mod: 25 | Performed by: PHYSICIAN ASSISTANT

## 2017-07-17 PROCEDURE — 84443 ASSAY THYROID STIM HORMONE: CPT | Performed by: PHYSICIAN ASSISTANT

## 2017-07-17 PROCEDURE — 80061 LIPID PANEL: CPT | Performed by: PHYSICIAN ASSISTANT

## 2017-07-17 PROCEDURE — 36415 COLL VENOUS BLD VENIPUNCTURE: CPT | Performed by: PHYSICIAN ASSISTANT

## 2017-07-17 PROCEDURE — 85027 COMPLETE CBC AUTOMATED: CPT | Performed by: PHYSICIAN ASSISTANT

## 2017-07-17 RX ORDER — HYDROCODONE BITARTRATE AND ACETAMINOPHEN 5; 325 MG/1; MG/1
1-2 TABLET ORAL EVERY 6 HOURS PRN
Qty: 20 TABLET | Refills: 0 | Status: SHIPPED | OUTPATIENT
Start: 2017-07-17 | End: 2017-08-01

## 2017-07-17 RX ORDER — FLUOXETINE 10 MG/1
CAPSULE ORAL
Qty: 90 CAPSULE | Refills: 3 | Status: SHIPPED | OUTPATIENT
Start: 2017-07-17 | End: 2017-11-15

## 2017-07-17 NOTE — NURSING NOTE
"Chief Complaint   Patient presents with     Physical       Initial /74  Pulse 66  Temp 97.8  F (36.6  C) (Tympanic)  Ht 5' 2\" (1.575 m)  Wt 159 lb (72.1 kg)  LMP 07/08/2017  BMI 29.08 kg/m2 Estimated body mass index is 29.08 kg/(m^2) as calculated from the following:    Height as of this encounter: 5' 2\" (1.575 m).    Weight as of this encounter: 159 lb (72.1 kg).  Medication Reconciliation: complete    "

## 2017-07-17 NOTE — PROGRESS NOTES
SUBJECTIVE:   CC: Sha Vidal is an 47 year old woman who presents for preventive health visit.     Physical   Annual:     Getting at least 3 servings of Calcium per day::  Yes    Bi-annual eye exam::  Yes    Dental care twice a year::  Yes    Sleep apnea or symptoms of sleep apnea::  None    Diet::  Regular (no restrictions)    Frequency of exercise::  2-3 days/week    Duration of exercise::  45-60 minutes    Taking medications regularly::  Yes    Medication side effects::  Not applicable    Additional concerns today::  YES (ER f/u from [facility], hernia, appt with surgeon next week, needs refill of pain med.)    See below regarding ER f/u    Today's PHQ-2 Score:   PHQ-2 ( 1999 [employer]) 7/14/2017   Q1: Little interest or pleasure in doing things 0   Q2: Feeling down, depressed or hopeless 0   PHQ-2 Score 0   Q1: Little interest or pleasure in doing things Not at all   Q2: Feeling down, depressed or hopeless Not at all   PHQ-2 Score 0       Abuse: Current or Past(Physical, Sexual or Emotional)- No  Do you feel safe in your environment - Yes    Social History   Substance Use Topics     Smoking status: Never Smoker     Smokeless tobacco: Never Used     Alcohol use 0.0 oz/week     0 Standard drinks or equivalent per week      Comment: occasionally     The patient does not drink >3 drinks per day nor >7 drinks per week.    Reviewed orders with patient.  Reviewed health maintenance and updated orders accordingly - Yes  Patient Active Problem List   Diagnosis     Hypothyroidism     CARDIOVASCULAR SCREENING; LDL GOAL LESS THAN 160     Migraine headache     Overweight     Past Surgical History:   Procedure Laterality Date     C NONSPECIFIC PROCEDURE      wisdom teeth extraction     C NONSPECIFIC PROCEDURE      Bilateral tubal ligation     C NONSPECIFIC PROCEDURE  1/01    Lasik     C NONSPECIFIC PROCEDURE      explo lap for ARMANDO for SBO       Social History   Substance Use Topics     Smoking status: Never Smoker      Smokeless tobacco: Never Used     Alcohol use 0.0 oz/week     0 Standard drinks or equivalent per week      Comment: occasionally     Family History   Problem Relation Age of Onset     DIABETES Father      type 2         Current Outpatient Prescriptions   Medication Sig Dispense Refill     cetirizine (ZYRTEC) 10 MG tablet Take 10 mg by mouth daily       HYDROcodone-acetaminophen (NORCO) 5-325 MG per tablet Take 1-2 tablets by mouth every 6 hours as needed for moderate to severe pain 20 tablet 0     FLUoxetine (PROZAC) 10 MG capsule TAKE 1 CAPSULE BY MOUTH  DAILY DUE FOR AN ANNUAL  VISIT IN JULY. PLEASE CALL  CLINIC TO SCHEDULE. 30 capsule 0     cholecalciferol (D 2000) 2000 UNITS tablet Take 2,000 Units by mouth       Multiple Vitamin (MULTI-VITAMINS) TABS Take 1 tablet by mouth       phentermine (ADIPEX-P) 37.5 MG tablet Take 1 tablet (37.5 mg) by mouth every morning (before breakfast) 31 tablet 2     levothyroxine (SYNTHROID/LEVOTHROID) 25 MCG tablet Take 1 tablet (25 mcg) by mouth daily 90 tablet 3     SUMAtriptan (IMITREX) 100 MG tablet Take 1 tablet (100 mg) by mouth at onset of headache for migraine May repeat in 2 hours if needed: max 2/day; average number of headaches  2 yearly 9 tablet 1     Allergies   Allergen Reactions     No Known Drug Allergies        Patient under age 50, mutual decision reflected in health maintenance.      Pertinent mammograms are reviewed under the imaging tab.  History of abnormal Pap smear:   Last 3 Pap Results:   PAP (no units)   Date Value   07/24/2015 NIL   02/02/2012 NIL       Reviewed and updated as needed this visit by clinical staff         Reviewed and updated as needed this visit by Provider          ROS:  C: NEGATIVE for fever, chills, change in weight  I: NEGATIVE for worrisome rashes, moles or lesions  E: NEGATIVE for vision changes or irritation  ENT: NEGATIVE for ear, mouth and throat problems  R: NEGATIVE for significant cough or SOB  B: NEGATIVE for masses,  "tenderness or discharge  CV: NEGATIVE for chest pain, palpitations or peripheral edema  GI: + abdominal pain epigastric and supraumbilical, pain with movement, taking Norco for pain with some relief, was in ER for this pain- found to have incarcerated hernia. Follows up with Dr. Hall next week. No fever, chills, vomiting, stool changes.   female: heavy menses, regularly- found to have uterine fibroid on CT scan, wonders if she needs follow up. Hemoglobin was slightly low in ER.  M: NEGATIVE for significant arthralgias or myalgia  N: NEGATIVE for weakness, dizziness or paresthesias  P: NEGATIVE for changes in mood or affect     OBJECTIVE:   /74  Pulse 66  Temp 97.8  F (36.6  C) (Tympanic)  Ht 5' 2\" (1.575 m)  Wt 159 lb (72.1 kg)  LMP 07/08/2017  BMI 29.08 kg/m2  EXAM:  GENERAL: healthy, alert and no distress  EYES: Eyes grossly normal to inspection, PERRL and conjunctivae and sclerae normal  HENT: ear canals and TM's normal, nose and mouth without ulcers or lesions  NECK: no adenopathy, no asymmetry, masses, or scars and thyroid normal to palpation  RESP: lungs clear to auscultation - no rales, rhonchi or wheezes  BREAST: declined  CV: regular rate and rhythm, normal S1 S2, no S3 or S4, no murmur, click or rub, no peripheral edema and peripheral pulses strong  ABDOMEN: soft, + epigastric and supraumbilical pain to palpation, no hepatosplenomegaly, no masses and bowel sounds normal   (female): declined  MS: no gross musculoskeletal defects noted, no edema  SKIN: + white patches of skin on hands consistent with vitiligo  NEURO: Normal strength and tone, mentation intact and speech normal  PSYCH: mentation appears normal, affect normal/bright    ASSESSMENT/PLAN:   1. Routine general medical examination at a health care facility  - CBC with platelets  - Comprehensive metabolic panel  - TSH with free T4 reflex  - Lipid panel reflex to direct LDL  - Vitamin D Deficiency    2. Screening for breast " "cancer  Due for mammogram in August, referral placed.   - *MA Screening Digital Bilateral; Future    3. Hypothyroidism, unspecified type  Chronic issue, recheck labs, has enough medication until December. Will notify if need to adjust dosing.    4. Adjustment disorder with mixed anxiety and depressed mood  Chronic issue, no SI/HI, mood is stable.  Meds refilled for 1 year.  - FLUoxetine (PROZAC) 10 MG capsule; TAKE 1 CAPSULE BY MOUTH  DAILY DUE FOR AN ANNUAL  VISIT IN JULY. PLEASE CALL  CLINIC TO SCHEDULE.  Dispense: 90 capsule; Refill: 3    5. Overweight  Chronic issue, doing [employer]. Working on lifestyle changes. Down 13 lb from last year, continue this.    6. Other migraine without status migrainosus, not intractable  Chronic issue, stable, very minimal use of Imitrex.  F/U as needed.    7. Supraumbilical hernia  New problem, has appt with Dr. Hall for surgery.  Refilled Hobbs for pain.  Discussed if increased pain, vomiting, fevers, needs to go to ER.  - HYDROcodone-acetaminophen (NORCO) 5-325 MG per tablet; Take 1-2 tablets by mouth every 6 hours as needed for moderate to severe pain  Dispense: 20 tablet; Refill: 0    8. Uterine leiomyoma, unspecified location  New problem, seen on CT scan from ER visit. Admits to heavy periods with mildly low hemoglobin, referral given to OB/GYN.  - OB/GYN REFERRAL    COUNSELING:  Reviewed preventive health counseling, as reflected in patient instructions         reports that she has never smoked. She has never used smokeless tobacco.    Estimated body mass index is 29.08 kg/(m^2) as calculated from the following:    Height as of this encounter: 5' 2\" (1.575 m).    Weight as of this encounter: 159 lb (72.1 kg).   Weight management plan: Discussed healthy diet and exercise guidelines and patient will follow up in 12 months in clinic to re-evaluate.    Counseling Resources:  ATP IV Guidelines  Pooled Cohorts Equation Calculator  Breast Cancer Risk Calculator  FRAX Risk " Assessment  ICSI Preventive Guidelines  Dietary Guidelines for Americans, 2010  USDA's MyPlate  ASA Prophylaxis  Lung CA Screening    Anita Quiros PA-C  [facility]

## 2017-07-18 LAB
ALBUMIN SERPL-MCNC: 3.6 G/DL (ref 3.4–5)
ALP SERPL-CCNC: 60 U/L (ref 40–150)
ALT SERPL W P-5'-P-CCNC: 17 U/L (ref 0–50)
ANION GAP SERPL CALCULATED.3IONS-SCNC: 7 MMOL/L (ref 3–14)
AST SERPL W P-5'-P-CCNC: 20 U/L (ref 0–45)
BILIRUB SERPL-MCNC: 0.3 MG/DL (ref 0.2–1.3)
BUN SERPL-MCNC: 22 MG/DL (ref 7–30)
CALCIUM SERPL-MCNC: 8.6 MG/DL (ref 8.5–10.1)
CHLORIDE SERPL-SCNC: 107 MMOL/L (ref 94–109)
CHOLEST SERPL-MCNC: 187 MG/DL
CO2 SERPL-SCNC: 26 MMOL/L (ref 20–32)
CREAT SERPL-MCNC: 0.83 MG/DL (ref 0.52–1.04)
DEPRECATED CALCIDIOL+CALCIFEROL SERPL-MC: 44 UG/L (ref 20–75)
GFR SERPL CREATININE-BSD FRML MDRD: 73 ML/MIN/1.7M2
GLUCOSE SERPL-MCNC: 89 MG/DL (ref 70–99)
HDLC SERPL-MCNC: 66 MG/DL
LDLC SERPL CALC-MCNC: 110 MG/DL
NONHDLC SERPL-MCNC: 121 MG/DL
POTASSIUM SERPL-SCNC: 4.4 MMOL/L (ref 3.4–5.3)
PROT SERPL-MCNC: 7.2 G/DL (ref 6.8–8.8)
SODIUM SERPL-SCNC: 140 MMOL/L (ref 133–144)
TRIGL SERPL-MCNC: 57 MG/DL
TSH SERPL DL<=0.005 MIU/L-ACNC: 2.93 MU/L (ref 0.4–4)

## 2017-07-24 ENCOUNTER — OFFICE VISIT (OUTPATIENT)
Dept: SURGERY | Facility: CLINIC | Age: 47
End: 2017-07-24
Payer: COMMERCIAL

## 2017-07-24 VITALS
DIASTOLIC BLOOD PRESSURE: 64 MMHG | OXYGEN SATURATION: 100 % | HEART RATE: 79 BPM | HEIGHT: 62 IN | WEIGHT: 159 LBS | BODY MASS INDEX: 29.26 KG/M2 | SYSTOLIC BLOOD PRESSURE: 128 MMHG

## 2017-07-24 DIAGNOSIS — K43.2 INCISIONAL HERNIA, WITHOUT OBSTRUCTION OR GANGRENE: Primary | ICD-10-CM

## 2017-07-24 PROCEDURE — 99203 OFFICE O/P NEW LOW 30 MIN: CPT | Performed by: SURGERY

## 2017-07-24 ASSESSMENT — ENCOUNTER SYMPTOMS
BRUISES/BLEEDS EASILY: 1
ABDOMINAL PAIN: 1

## 2017-07-24 NOTE — MR AVS SNAPSHOT
After Visit Summary   7/24/2017    Sha Vidal    MRN: [id number]           Patient Information     Date Of Birth          1970        Visit Information        Provider Department      7/24/2017 10:00 AM Damon Hall MD [facility] Hern[facility]      Today's Diagnoses     Incisional hernia, without obstruction or gangrene    -  1       Follow-ups after your visit        Your next 10 appointments already scheduled     Jul 28, 2017  3:10 PM CDT   Oswaldo Forrester with Anita Quiros PA-C   [facility] ([facility]    [address] [address] [address]   [phone]            Aug 03, 2017 10:00 AM CDT   Office Visit with Neptali Fournier MD   [facility]    [address] [id number] [phone]           Bring a current list of meds and any records pertaining to this visit.  For Physicals, please bring immunization records and any forms needing to be filled out.  Please arrive 10 minutes early to complete paperwork.            Aug 15, 2017  7:45 AM CDT   [facility] Same Day Surgery with Damon Hall MD, Dave Katz PA-C   [facility]    [facility]   [phone]            Aug 15, 2017   Procedure with Damon Hall MD   [facility] (--)    [address] [facility] [id number] [phone]              Who to contact     If you have questions or need follow up information about today's clinic visit or your schedule please contact [facility] directly at [phone].  Normal or non-critical lab and imaging results will be communicated to you by MyChart, letter or phone within 4 business days after the clinic has received  "the results. If you do not hear from us within 7 days, please contact the clinic through [employer] or phone. If you have a critical or abnormal lab result, we will notify you by phone as soon as possible.  Submit refill requests through [employer] or call your pharmacy and they will forward the refill request to us. Please allow 3 business days for your refill to be completed.          Additional Information About Your Visit        [employer] Information     [employer] gives you secure access to your electronic health record. If you see a primary care provider, you can also send messages to your care team and make appointments. If you have questions, please call your primary care clinic.  If you do not have a primary care provider, please call [phone] and they will assist you.        Care EveryWhere ID     This is your Care EveryWhere ID. This could be used by other organizations to access your [facility] medical records  [id number]        Your Vitals Were     Pulse Height Last Period Pulse Oximetry BMI (Body Mass Index)       79 5' 2\" (1.575 m) 07/08/2017 100% 29.08 kg/m2        Blood Pressure from Last 3 Encounters:   07/24/17 128/64   07/17/17 104/74   07/09/17 120/82    Weight from Last 3 Encounters:   07/24/17 159 lb (72.1 kg)   07/17/17 159 lb (72.1 kg)   07/09/17 155 lb (70.3 kg)              Today, you had the following     No orders found for display       Primary Care Provider Office Phone # Fax #    Anita Quiros PA-C [phone]       [facility] [address] [facility] [address]        Equal Access to Services     LEONOR CONLEY AH: Hadii aad ku hadasho Soomaali, waaxda luqadaha, qaybta kaalmada adeharoonyamaria elena, morales donahue. So [place] [phone].    ATENCIÓN: Si habla español, tiene a collins disposición servicios gratuitos de asistencia lingüística. Llame al [phone].    We comply with applicable federal civil rights laws and [place] laws. We " do not discriminate on the basis of race, color, national origin, age, disability sex, sexual orientation or gender identity.            Thank you!     Thank you for choosing [facility]  for your care. Our goal is always to provide you with excellent care. Hearing back from our patients is one way we can continue to improve our services. Please take a few minutes to complete the written survey that you may receive in the mail after your visit with us. Thank you!             Your Updated Medication List - Protect others around you: Learn how to safely use, store and throw away your medicines at www.disposemymeds.org.          This list is accurate as of: 7/24/17  3:21 PM.  Always use your most recent med list.                   Brand Name Dispense Instructions for use Diagnosis    cetirizine 10 MG tablet    zyrTEC     Take 10 mg by mouth daily        D 2000 2000 UNITS tablet   Generic drug:  cholecalciferol      Take 2,000 Units by mouth        FLUoxetine 10 MG capsule    PROzac    90 capsule    TAKE 1 CAPSULE BY MOUTH  DAILY DUE FOR AN ANNUAL  VISIT IN JULY. PLEASE CALL  CLINIC TO SCHEDULE.    Adjustment disorder with mixed anxiety and depressed mood       HYDROcodone-acetaminophen 5-325 MG per tablet    NORCO    20 tablet    Take 1-2 tablets by mouth every 6 hours as needed for moderate to severe pain    Supraumbilical hernia       levothyroxine 25 MCG tablet    SYNTHROID/LEVOTHROID    90 tablet    Take 1 tablet (25 mcg) by mouth daily    Hypothyroidism, unspecified type       MULTI-VITAMINS Tabs      Take 1 tablet by mouth        phentermine 37.5 MG tablet    ADIPEX-P    31 tablet    Take 1 tablet (37.5 mg) by mouth every morning (before breakfast)    Hypothyroidism due to Hashimoto's thyroiditis, CARDIOVASCULAR SCREENING; LDL GOAL LESS THAN 160, Family history of diabetes mellitus       SUMAtriptan 100 MG tablet    IMITREX    9 tablet    Take 1 tablet (100 mg) by mouth at onset of headache  for migraine May repeat in 2 hours if needed: max 2/day; average number of headaches  2 yearly    Migraine without aura and without status migrainosus, not intractable

## 2017-07-24 NOTE — PROGRESS NOTES
HPI      ROS (Review of Systems):      Positive for thyroid problem.   GASTROINTESTINAL: Positive for abdominal pain.   Endo/Heme/Allergies: Bruises/bleeds easily.          Physical Exam

## 2017-07-24 NOTE — PROGRESS NOTES
HPI:  Sha is a 47 year old female who presents for evaluation of bulging at the superior aspect of her old midline incision.  This recently became painful.  The patient presented to the emergency room, where she was felt to have some incarcerated fat.  Her symptoms have improved since then.  She still has some mild discomfort.      Past Medical History:   has a past medical history of Allergic rhinitis, cause unspecified; Bleeding disorder (H); CARDIOVASCULAR SCREENING; LDL GOAL LESS THAN 160 (10/31/2010); Hashimoto's thyroiditis; Lateral epicondylitis (3/13/2009); Migraine headache (2/19/2013); NONSPECIFIC MEDICAL HISTORY (2002); and NONSPECIFIC MEDICAL HISTORY (2002).    Past Surgical History:  Past Surgical History:   Procedure Laterality Date     ABDOMEN SURGERY      10/2007     APPENDECTOMY      10/2007     C NONSPECIFIC PROCEDURE      wisdom teeth extraction     C NONSPECIFIC PROCEDURE      Bilateral tubal ligation     C NONSPECIFIC PROCEDURE  1/01    Lasik     C NONSPECIFIC PROCEDURE      explo lap for ARMANDO for SBO        Social History:  Social History     Social History     Marital status:      Spouse name: N/A     Number of children: N/A     Years of education: N/A     Occupational History     Not on file.     Social History Main Topics     Smoking status: Never Smoker     Smokeless tobacco: Never Used     Alcohol use 0.0 oz/week     0 Standard drinks or equivalent per week      Comment: occasionally     Drug use: No     Sexual activity: Yes     Partners: Male     Other Topics Concern     Parent/Sibling W/ Cabg, Mi Or Angioplasty Before 65f 55m? No      Service No     Blood Transfusions No     Caffeine Concern No     Occupational Exposure No     Hobby Hazards No     Sleep Concern Yes     Stress Concern No     Weight Concern Yes     Special Diet No     Back Care No     Exercise No     Bike Helmet No     Seat Belt No     Self-Exams No     Social History Narrative        Family  History:  Family History   Problem Relation Age of Onset     DIABETES Father      type 2     Coronary Artery Disease Mother      DIABETES Maternal Grandmother          ROS:  The 10 point review of systems is negative other than noted in the HPI and above.    PE:    General- Well-developed, well-nourished, patient able to get up on table without difficulty.  HEENT- Normocephalic and atraumatic. Pupils equal and round.  Mucous membranes moist.  Sclera are nonicteric.  Neck- No lymphadenopathy or masses   Respirations- are regular and non labored  Abdomen is abdomen is soft without significant tenderness, masses, organomegaly or guarding  Hernia-   There is a partially reducible supraumbilical ventral hernia.  There is a small umbilical hernia.               Assessment: Incisional hernia with umbilical component    Plan:  I have recommended repair of the patient's Incisional hernia with mesh.  I think a robotic approach is most appropriate, given the distance between the umbilical and supraumbilical defects.  We have discussed observation, reduction techniques and importance, incarceration and strangulation signs, symptoms and importance as well as need to seek emergency treatment.    We have discussed surgery in detail, including risk, benefits, complications, mesh, infection, lifting and activity limits after surgery. She has been given literature to review. We will schedule surgery at patient's convenience.  We will plan on a general anesthetic.        Damon Hall MD    Please route or send letter to:  Primary Care Provider (PCP)

## 2017-07-24 NOTE — LETTER
2017    RE:  Sha STEPHENS Young-:  [birth year]    HPI:  Sha is a 47 year old female who presents for evaluation of bulging at the superior aspect of her old midline incision.  This recently became painful.  The patient presented to the emergency room, where she was felt to have some incarcerated fat.  Her symptoms have improved since then.  She still has some mild discomfort.        Past Medical History:  Has a past medical history of Allergic rhinitis, cause unspecified; Bleeding disorder (H); CARDIOVASCULAR SCREENING; LDL GOAL LESS THAN 160 (10/31/2010); Hashimoto's thyroiditis; Lateral epicondylitis (3/13/2009); Migraine headache (2013); NONSPECIFIC MEDICAL HISTORY (); and NONSPECIFIC MEDICAL HISTORY ().     ROS:  The 10 point review of systems is negative other than noted in the HPI and above.     PE:    General- Well-developed, well-nourished, patient able to get up on table without difficulty.  HEENT- Normocephalic and atraumatic. Pupils equal and round.  Mucous membranes moist.  Sclera are nonicteric.  Neck- No lymphadenopathy or masses   Respirations- are regular and non labored  Abdomen is abdomen is soft without significant tenderness, masses, organomegaly or guarding  Hernia-   There is a partially reducible supraumbilical ventral hernia.  There is a small umbilical hernia.      Assessment: Incisional hernia with umbilical component     Plan:  I have recommended repair of the patient's Incisional hernia with mesh.  I think a robotic approach is most appropriate, given the distance between the umbilical and supraumbilical defects.  We have discussed observation, reduction techniques and importance, incarceration and strangulation signs, symptoms and importance as well as need to seek emergency treatment.    We have discussed surgery in detail, including risk, benefits, complications, mesh, infection, lifting and activity limits after surgery. She has been given literature to review.  We will schedule surgery at patient's convenience.  We will plan on a general anesthetic.        Damon Hall MD

## 2017-08-01 ENCOUNTER — OFFICE VISIT (OUTPATIENT)
Dept: FAMILY MEDICINE | Facility: CLINIC | Age: 47
End: 2017-08-01
Payer: COMMERCIAL

## 2017-08-01 VITALS
BODY MASS INDEX: 29.32 KG/M2 | HEIGHT: 62 IN | SYSTOLIC BLOOD PRESSURE: 124 MMHG | RESPIRATION RATE: 16 BRPM | OXYGEN SATURATION: 100 % | HEART RATE: 68 BPM | WEIGHT: 159.3 LBS | TEMPERATURE: 98.1 F | DIASTOLIC BLOOD PRESSURE: 62 MMHG

## 2017-08-01 DIAGNOSIS — Z01.818 PREOP GENERAL PHYSICAL EXAM: Primary | ICD-10-CM

## 2017-08-01 DIAGNOSIS — E03.9 HYPOTHYROIDISM, UNSPECIFIED TYPE: ICD-10-CM

## 2017-08-01 DIAGNOSIS — G43.809 OTHER MIGRAINE WITHOUT STATUS MIGRAINOSUS, NOT INTRACTABLE: ICD-10-CM

## 2017-08-01 DIAGNOSIS — F43.23 ADJUSTMENT DISORDER WITH MIXED ANXIETY AND DEPRESSED MOOD: ICD-10-CM

## 2017-08-01 DIAGNOSIS — K43.2 INCISIONAL HERNIA, WITHOUT OBSTRUCTION OR GANGRENE: ICD-10-CM

## 2017-08-01 LAB — HGB BLD-MCNC: 11.9 G/DL (ref 11.7–15.7)

## 2017-08-01 PROCEDURE — 36415 COLL VENOUS BLD VENIPUNCTURE: CPT | Performed by: PHYSICIAN ASSISTANT

## 2017-08-01 PROCEDURE — 99214 OFFICE O/P EST MOD 30 MIN: CPT | Performed by: PHYSICIAN ASSISTANT

## 2017-08-01 PROCEDURE — 85018 HEMOGLOBIN: CPT | Performed by: PHYSICIAN ASSISTANT

## 2017-08-01 RX ORDER — HYDROCODONE BITARTRATE AND ACETAMINOPHEN 5; 325 MG/1; MG/1
1-2 TABLET ORAL EVERY 6 HOURS PRN
Qty: 30 TABLET | Refills: 0 | Status: SHIPPED | OUTPATIENT
Start: 2017-08-01 | End: 2017-09-21

## 2017-08-01 NOTE — NURSING NOTE
"Chief Complaint   Patient presents with     Pre-Op Exam       Initial /62 (BP Location: Right arm, Patient Position: Chair, Cuff Size: Adult Large)  Pulse 68  Temp 98.1  F (36.7  C) (Tympanic)  Resp 16  Ht 5' 2\" (1.575 m)  Wt 159 lb 4.8 oz (72.3 kg)  LMP 07/08/2017  SpO2 100%  BMI 29.14 kg/m2 Estimated body mass index is 29.14 kg/(m^2) as calculated from the following:    Height as of this encounter: 5' 2\" (1.575 m).    Weight as of this encounter: 159 lb 4.8 oz (72.3 kg).  Medication Reconciliation: complete   Alyssa Ferreira MA       "

## 2017-08-01 NOTE — PROGRESS NOTES
[facility]  [address] [facility] [address] [phone]  Dept: [phone]    PRE-OP EVALUATION:  Today's date: 2017    Sha Vidal (: 1970) presents for pre-operative evaluation assessment as requested by Dr. Hall.  She requires evaluation and anesthesia risk assessment prior to undergoing surgery/procedure for treatment .  Proposed procedure: Hernia Repair     Date of Surgery/ Procedure: 08/15/17  Time of Surgery/ Procedure: 7:00am  Hospital/Surgical Facility: [facility]  Primary Physician: Anita Quiros  Type of Anesthesia Anticipated: General    Patient has a Health Care Directive or Living Will:  NO    Preop Questions 2017   1.  Do you have a history of heart attack, stroke, stent, bypass or surgery on an artery in the head, neck, heart or legs? No   2.  Do you ever have any pain or discomfort in your chest? No   3.  Do you have a history of  Heart Failure? No   4.   Are you troubled by shortness of breath when:  walking on a level surface, or up a slight hill, or at night? No   5.  Do you currently have a cold, bronchitis or other respiratory infection? No   6.  Do you have a cough, shortness of breath, or wheezing? No   7.  Do you sometimes get pains in the calves of your legs when you walk? No   8. Do you or anyone in your family have previous history of blood clots? No   9.  Do you or does anyone in your family have a serious bleeding problem such as prolonged bleeding following surgeries or cuts? No   10. Have you ever had problems with anemia or been told to take iron pills? No   11. Have you had any abnormal blood loss such as black, tarry or bloody stools, or abnormal vaginal bleeding? No   12. Have you ever had a blood transfusion? No   13. Have you or any of your relatives ever had problems with anesthesia? No   14. Do you have sleep apnea, excessive snoring or daytime drowsiness? No   15. Do you have any prosthetic heart valves? No    16. Do you have prosthetic joints? No   17. Is there any chance that you may be pregnant? No           HPI:                                                      Brief HPI related to upcoming procedure: history of three hernias, repairing all three hernias, mesh procedure.      DEPRESSION - Patient has a long history of Depression of moderate severity requiring medication for control with recent symptoms being stable..Current symptoms of depression include none.                                                                                                                                                                                    .  HYPOTHYROIDISM - Patient has a longstanding history of chronic Hypothyroidism. Patient has been doing well, noting no tremor, insomnia, hair loss or changes in skin texture. Last TSH value of 2.93. Continues to take medications as directed, without adverse reactions or side effects.                                                                                                                                                                                                                        .    MEDICAL HISTORY:                                                    Patient Active Problem List    Diagnosis Date Noted     Vitiligo 07/17/2017     Priority: Medium     Supraumbilical hernia 07/17/2017     Priority: Medium     Uterine leiomyoma, unspecified location 07/17/2017     Priority: Medium     Adjustment disorder with mixed anxiety and depressed mood 07/17/2017     Priority: Medium     Overweight 07/28/2015     Priority: Medium     Migraine headache 02/19/2013     Priority: Medium     CARDIOVASCULAR SCREENING; LDL GOAL LESS THAN 160 10/31/2010     Priority: Medium     Hypothyroidism 11/18/2009     Priority: Medium      Past Medical History:   Diagnosis Date     Allergic rhinitis, cause unspecified      Bleeding disorder (H)     factor 12     CARDIOVASCULAR SCREENING; LDL GOAL  LESS THAN 160 10/31/2010     Hashimoto's thyroiditis      Lateral epicondylitis 3/13/2009     Migraine headache 2/19/2013     NONSPECIFIC MEDICAL HISTORY 2002    Factor XII deficiency (asymptomatic)     NONSPECIFIC MEDICAL HISTORY 2002    GRAYSON positive 1:320, seen by Rheum (negative PHUONG abebe)     Past Surgical History:   Procedure Laterality Date     ABDOMEN SURGERY      10/2007     APPENDECTOMY      10/2007     C NONSPECIFIC PROCEDURE      wisdom teeth extraction     C NONSPECIFIC PROCEDURE      Bilateral tubal ligation     C NONSPECIFIC PROCEDURE  1/01    Lasik     C NONSPECIFIC PROCEDURE      explo lap for ARMANDO for SBO     Current Outpatient Prescriptions   Medication Sig Dispense Refill     HYDROcodone-acetaminophen (NORCO) 5-325 MG per tablet Take 1-2 tablets by mouth every 6 hours as needed for moderate to severe pain 20 tablet 0     FLUoxetine (PROZAC) 10 MG capsule TAKE 1 CAPSULE BY MOUTH  DAILY DUE FOR AN ANNUAL  VISIT IN JULY. PLEASE CALL  CLINIC TO SCHEDULE. 90 capsule 3     cetirizine (ZYRTEC) 10 MG tablet Take 10 mg by mouth daily       cholecalciferol (D 2000) 2000 UNITS tablet Take 2,000 Units by mouth       Multiple Vitamin (MULTI-VITAMINS) TABS Take 1 tablet by mouth       levothyroxine (SYNTHROID/LEVOTHROID) 25 MCG tablet Take 1 tablet (25 mcg) by mouth daily 90 tablet 3     phentermine (ADIPEX-P) 37.5 MG tablet Take 1 tablet (37.5 mg) by mouth every morning (before breakfast) (Patient not taking: Reported on 8/1/2017) 31 tablet 2     SUMAtriptan (IMITREX) 100 MG tablet Take 1 tablet (100 mg) by mouth at onset of headache for migraine May repeat in 2 hours if needed: max 2/day; average number of headaches  2 yearly (Patient not taking: Reported on 8/1/2017) 9 tablet 1     OTC products: None, except as noted above, no recent use of OTC ASA, NSAIDS or Steroids and no use of herbal medications or other supplements    Allergies   Allergen Reactions     No Known Drug Allergies       Latex Allergy:  "NO    Social History   Substance Use Topics     Smoking status: Never Smoker     Smokeless tobacco: Never Used     Alcohol use 0.0 oz/week     0 Standard drinks or equivalent per week      Comment: occasionally     History   Drug Use No       REVIEW OF SYSTEMS:                                                    C: NEGATIVE for fever, chills, change in weight  I: NEGATIVE for worrisome rashes, moles or lesions  E: NEGATIVE for vision changes or irritation  E/M: NEGATIVE for ear, mouth and throat problems  R: NEGATIVE for significant cough or SOB  B: NEGATIVE for masses, tenderness or discharge  CV: NEGATIVE for chest pain, palpitations or peripheral edema  GI: NEGATIVE for nausea, + abdominal pain at central abdomen, heartburn, or change in bowel habits  : NEGATIVE for frequency, dysuria, or hematuria  M: NEGATIVE for significant arthralgias or myalgia  N: NEGATIVE for weakness, dizziness or paresthesias  E: NEGATIVE for temperature intolerance, skin/hair changes  H: NEGATIVE for bleeding problems  P: NEGATIVE for changes in mood or affect    EXAM:                                                    /62 (BP Location: Right arm, Patient Position: Chair, Cuff Size: Adult Large)  Pulse 68  Temp 98.1  F (36.7  C) (Tympanic)  Resp 16  Ht 5' 2\" (1.575 m)  Wt 159 lb 4.8 oz (72.3 kg)  LMP 07/08/2017  SpO2 100%  BMI 29.14 kg/m2    GENERAL APPEARANCE: healthy, alert and no distress     EYES: EOMI, PERRL     HENT: ear canals and TM's normal and nose and mouth without ulcers or lesions     NECK: no adenopathy, no asymmetry, masses, or scars and thyroid normal to palpation     RESP: lungs clear to auscultation - no rales, rhonchi or wheezes     CV: regular rates and rhythm, normal S1 S2, no S3 or S4 and no murmur, click or rub     ABDOMEN:  soft, nontender, no HSM or masses and bowel sounds normal     MS: extremities normal- no gross deformities noted, no evidence of inflammation in joints, FROM in all " extremities.     SKIN: no suspicious lesions or rashes     NEURO: Normal strength and tone, sensory exam grossly normal, mentation intact and speech normal     PSYCH: mentation appears normal. and affect normal/bright     LYMPHATICS: No axillary, cervical, or supraclavicular nodes    DIAGNOSTICS:                                                    Hemoglobin (indicated for history of anemia or procedure with significant blood loss such as tonsillectomy, major intraperitoneal surgery, vascular surgery, major spine surgery, total joint replacement)    Recent Labs   Lab Test  07/17/17   0907  07/09/17   1925 04/28/17 08/04/16   0748   11/24/09   0641   HGB  11.7  11.3*   --    --    < >   --    PLT  340  320   --    --    < >   --    INR   --    --    --    --    --   1.02   NA  140  137   --    --    < >   --    POTASSIUM  4.4  3.7   --    --    < >   --    CR  0.83  0.90   --    --    < >   --    A1C   --    --   5.0  5.0   --    --     < > = values in this interval not displayed.      Results for orders placed or performed in visit on 08/01/17   Hemoglobin   Result Value Ref Range    Hemoglobin 11.9 11.7 - 15.7 g/dL         IMPRESSION:                                                    Reason for surgery/procedure: Hernia Repair   Diagnosis/reason for consult:hernia, incisional and supraumbilical    The proposed surgical procedure is considered INTERMEDIATE risk.    REVISED CARDIAC RISK INDEX  The patient has the following serious cardiovascular risks for perioperative complications such as (MI, PE, VFib and 3  AV Block):  No serious cardiac risks  INTERPRETATION: 0 risks: Class I (very low risk - 0.4% complication rate)    The patient has the following additional risks for perioperative complications:  No identified additional risks      ICD-10-CM    1. Preop general physical exam Z01.818 HYDROcodone-acetaminophen (NORCO) 5-325 MG per tablet     Hemoglobin   2. Incisional hernia, without obstruction or gangrene  K43.2 HYDROcodone-acetaminophen (NORCO) 5-325 MG per tablet     Hemoglobin   3. Hypothyroidism, unspecified type E03.9    4. Other migraine without status migrainosus, not intractable G43.809    5. Adjustment disorder with mixed anxiety and depressed mood F43.23        RECOMMENDATIONS:                                                      --Consult hospital rounder / IM to assist post-op medical management  --Patient understands NPO recommendations  --Advised no ASA, NSAIDs or Steroids 7-10 prior to procedure, Tylenol (Acetaminopen ok)  --Patient to skip all medications morning of procedure, may resume post op.      APPROVAL GIVEN to proceed with proposed procedure, without further diagnostic evaluation       Signed Electronically by: Anita Quiros PA-C    Copy of this evaluation report is provided to requesting physician.    [facility] Preop Guidelines

## 2017-08-01 NOTE — MR AVS SNAPSHOT
After Visit Summary   8/1/2017    Sha Vidal    MRN: [id number]           Patient Information     Date Of Birth          1970        Visit Information        Provider Department      8/1/2017 7:30 AM Anita Quiros PA-C [facility]        Today's Diagnoses     Preop general physical exam    -  1    Incisional hernia, without obstruction or gangrene        Hypothyroidism, unspecified type        Other migraine without status migrainosus, not intractable        Adjustment disorder with mixed anxiety and depressed mood          Care Instructions      Before Your Surgery      Call your surgeon if there is any change in your health. This includes signs of a cold or flu (such as a sore throat, runny nose, cough, rash or fever).    Do not smoke, drink alcohol or take over the counter medicine (unless your surgeon or primary care doctor tells you to) for the 24 hours before and after surgery.    If you take prescribed drugs: Follow your doctor s orders about which medicines to take and which to stop until after surgery.    Eating and drinking prior to surgery: follow the instructions from your surgeon    Take a shower or bath the night before surgery. Use the soap your surgeon gave you to gently clean your skin. If you do not have soap from your surgeon, use your regular soap. Do not shave or scrub the surgery site.  Wear clean pajamas and have clean sheets on your bed.           Follow-ups after your visit        Your next 10 appointments already scheduled     Aug 03, 2017 10:00 AM CDT   [facility]nier [facility]   [facility]    [address] [address] [phone]           Bring a current list of meds and any records pertaining to this visit. For Physicals, please bring immunization records and any forms needing to be filled out. Please arrive 10 minutes early to complete  "paperwork.            Aug 15, 2017  7:45 AM CDT   [facility] Same Day Surgery with Damon Hall MD, Dave Katz PA-C   [facility] ([facility])    [facility] ([facility])   [phone]            Aug 15, 2017   Procedure with Damon Hall MD   [facility] (--)    [address] [facility] [id number] [phone]              Who to contact     If you have questions or need follow up information about today's clinic visit or your schedule please contact [facility] directly at [phone].  Normal or non-critical lab and imaging results will be communicated to you by [employer], letter or phone within 4 business days after the clinic has received the results. If you do not hear from us within 7 days, please contact the clinic through [employer] or phone. If you have a critical or abnormal lab result, we will notify you by phone as soon as possible.  Submit refill requests through [employer] or call your pharmacy and they will forward the refill request to us. Please allow 3 business days for your refill to be completed.          Additional Information About Your Visit        [employer] Information     [employer] gives you secure access to your electronic health record. If you see a primary care provider, you can also send messages to your care team and make appointments. If you have questions, please call your primary care clinic.  If you do not have a primary care provider, please call [phone] and they will assist you.        Care EveryWhere ID     This is your Care EveryWhere ID. This could be used by other organizations to access your [facility] medical records  [id number]        Your Vitals Were     Pulse Temperature Respirations Height Last Period Pulse Oximetry    68 98.1  F (36.7  C) (Tympanic) 16 5' 2\" (1.575 m) 07/08/2017 100%    BMI (Body Mass Index)       "             29.14 kg/m2            Blood Pressure from Last 3 Encounters:   08/01/17 124/62   07/24/17 128/64   07/17/17 104/74    Weight from Last 3 Encounters:   08/01/17 159 lb 4.8 oz (72.3 kg)   07/24/17 159 lb (72.1 kg)   07/17/17 159 lb (72.1 kg)              We Performed the Following     Hemoglobin          Where to get your medicines      Some of these will need a paper prescription and others can be bought over the counter.  Ask your nurse if you have questions.     Bring a paper prescription for each of these medications     HYDROcodone-acetaminophen 5-325 MG per tablet          Primary Care Provider Office Phone # Fax #    Anita Quiros PA-C [phone]       [facility] [address] [facility] [address]        Equal Access to Services     ROBSON CONLEY : Hadii melodie pearce hadasho Soomaali, waaxda luqadaha, qaybta kaalmada adeegyada, morales bland . So [facility] [phone].    ATENCIÓN: Si habla español, tiene a collins disposición servicios gratuitos de asistencia lingüística. Llame al [phone].    We comply with applicable federal civil rights laws and [place] laws. We do not discriminate on the basis of race, color, national origin, age, disability sex, sexual orientation or gender identity.            Thank you!     Thank you for choosing [facility]  for your care. Our goal is always to provide you with excellent care. Hearing back from our patients is one way we can continue to improve our services. Please take a few minutes to complete the written survey that you may receive in the mail after your visit with us. Thank you!             Your Updated Medication List - Protect others around you: Learn how to safely use, store and throw away your medicines at www.disposemymeds.org.          This list is accurate as of: 8/1/17  7:58 AM.  Always use your most recent med list.                   Brand Name Dispense Instructions  for use Diagnosis    cetirizine 10 MG tablet    zyrTEC     Take 10 mg by mouth daily        D 2000 2000 UNITS tablet   Generic drug:  cholecalciferol      Take 2,000 Units by mouth        FLUoxetine 10 MG capsule    PROzac    90 capsule    TAKE 1 CAPSULE BY MOUTH  DAILY DUE FOR AN ANNUAL  VISIT IN JULY. PLEASE CALL  CLINIC TO SCHEDULE.    Adjustment disorder with mixed anxiety and depressed mood       HYDROcodone-acetaminophen 5-325 MG per tablet    NORCO    30 tablet    Take 1-2 tablets by mouth every 6 hours as needed for moderate to severe pain    Preop general physical exam, Incisional hernia, without obstruction or gangrene       levothyroxine 25 MCG tablet    SYNTHROID/LEVOTHROID    90 tablet    Take 1 tablet (25 mcg) by mouth daily    Hypothyroidism, unspecified type       MULTI-VITAMINS Tabs      Take 1 tablet by mouth        phentermine 37.5 MG tablet    ADIPEX-P    31 tablet    Take 1 tablet (37.5 mg) by mouth every morning (before breakfast)    Hypothyroidism due to Hashimoto's thyroiditis, CARDIOVASCULAR SCREENING; LDL GOAL LESS THAN 160, Family history of diabetes mellitus       SUMAtriptan 100 MG tablet    IMITREX    9 tablet    Take 1 tablet (100 mg) by mouth at onset of headache for migraine May repeat in 2 hours if needed: max 2/day; average number of headaches  2 yearly    Migraine without aura and without status migrainosus, not intractable

## 2017-08-03 ENCOUNTER — OFFICE VISIT (OUTPATIENT)
Dept: OBGYN | Facility: CLINIC | Age: 47
End: 2017-08-03
Payer: COMMERCIAL

## 2017-08-03 VITALS
WEIGHT: 162 LBS | BODY MASS INDEX: 29.63 KG/M2 | DIASTOLIC BLOOD PRESSURE: 72 MMHG | OXYGEN SATURATION: 98 % | HEART RATE: 80 BPM | SYSTOLIC BLOOD PRESSURE: 108 MMHG

## 2017-08-03 DIAGNOSIS — N92.0 MENORRHAGIA WITH REGULAR CYCLE: Primary | ICD-10-CM

## 2017-08-03 DIAGNOSIS — D25.9 UTERINE LEIOMYOMA, UNSPECIFIED LOCATION: ICD-10-CM

## 2017-08-03 LAB
BASOPHILS # BLD AUTO: 0 10E9/L (ref 0–0.2)
BASOPHILS NFR BLD AUTO: 0 %
DIFFERENTIAL METHOD BLD: ABNORMAL
EOSINOPHIL # BLD AUTO: 0.4 10E9/L (ref 0–0.7)
EOSINOPHIL NFR BLD AUTO: 6.1 %
ERYTHROCYTE [DISTWIDTH] IN BLOOD BY AUTOMATED COUNT: 15.3 % (ref 10–15)
HCT VFR BLD AUTO: 36.6 % (ref 35–47)
HGB BLD-MCNC: 11.8 G/DL (ref 11.7–15.7)
LYMPHOCYTES # BLD AUTO: 1.7 10E9/L (ref 0.8–5.3)
LYMPHOCYTES NFR BLD AUTO: 23.5 %
MCH RBC QN AUTO: 26.3 PG (ref 26.5–33)
MCHC RBC AUTO-ENTMCNC: 32.2 G/DL (ref 31.5–36.5)
MCV RBC AUTO: 82 FL (ref 78–100)
MONOCYTES # BLD AUTO: 0.6 10E9/L (ref 0–1.3)
MONOCYTES NFR BLD AUTO: 8.6 %
NEUTROPHILS # BLD AUTO: 4.3 10E9/L (ref 1.6–8.3)
NEUTROPHILS NFR BLD AUTO: 61.8 %
PLATELET # BLD AUTO: 354 10E9/L (ref 150–450)
RBC # BLD AUTO: 4.48 10E12/L (ref 3.8–5.2)
WBC # BLD AUTO: 7 10E9/L (ref 4–11)

## 2017-08-03 PROCEDURE — 36415 COLL VENOUS BLD VENIPUNCTURE: CPT | Performed by: OBSTETRICS & GYNECOLOGY

## 2017-08-03 PROCEDURE — 99204 OFFICE O/P NEW MOD 45 MIN: CPT | Performed by: OBSTETRICS & GYNECOLOGY

## 2017-08-03 PROCEDURE — 85025 COMPLETE CBC W/AUTO DIFF WBC: CPT | Performed by: OBSTETRICS & GYNECOLOGY

## 2017-08-03 NOTE — PATIENT INSTRUCTIONS
You can reach your [facility] Care Team any time of the day by calling [phone]. This number will put you in touch with the 24 hour nurse line if the clinic is closed.    To contact your OB/GYN Surgery Scheduler please call [phone]. This is a direct number for your care team between 8 a.m. and 4 p.m. Monday through Friday.    [facility] is open for your convenience: [phone]  Monday through Friday 8 a.m. to 8:30 p.m.  Saturday 9 a.m. to 6 p.m.  Sunday Noon to 6 p.m.    They are closed on all major holidays.

## 2017-08-03 NOTE — PROGRESS NOTES
HPI:  Sha Vidal is a 47 year old female  Patient's last menstrual period was 2017. Tubal ligation for contraception, who presents for evaluation of long standing menorrhagia and a recently noted uterine fibroid found on CT for an unrelated cause. Pt endorses heavy menstrual cycles. Pt says her heavy periods have been happening for the last 10 years.  She reported her periods had gotten better with removal of a fibroid 1.5 yrs ago but, they are getting worse again. Pt endorses the first day or two of period is heavy. She explains her periods keep her from leaving the house at times. 1 super  Pad and tampon would take a couple of hours to fill. Pt explains using a tampon and pad when her period is heavy, and tries to stay close to home. Pt endorses her cycles are regular, every 30 days. Denies blood transfusion sx of anemia or vaso vagal sx or iron therapy. Denies dizziness, fatigue, unscheduled bleeding  or post coital  bleeding. Denies hx of abnormal pap smear or sx to suggest an endocrinopathy.      Past Medical History:   Diagnosis Date     Allergic rhinitis, cause unspecified      Bleeding disorder (H)     factor 12     CARDIOVASCULAR SCREENING; LDL GOAL LESS THAN 160 10/31/2010     Hashimoto's thyroiditis      Lateral epicondylitis 3/13/2009     Migraine headache 2013     NONSPECIFIC MEDICAL HISTORY     Factor XII deficiency (asymptomatic)     NONSPECIFIC MEDICAL HISTORY     GRAYSON positive 1:320, seen by Rheum (negative PHUONG eval)     Past Surgical History:   Procedure Laterality Date     ABDOMEN SURGERY      10/2007     APPENDECTOMY      10/2007     C NONSPECIFIC PROCEDURE      wisdom teeth extraction     C NONSPECIFIC PROCEDURE      Bilateral tubal ligation     C NONSPECIFIC PROCEDURE      Ashkan     C NONSPECIFIC PROCEDURE      explo lap for ARMANDO for SBO     Family History   Problem Relation Age of Onset     DIABETES Father      type 2     Coronary Artery Disease Mother       DIABETES Maternal Grandmother      Social History     Social History     Marital status:      Spouse name: N/A     Number of children: N/A     Years of education: N/A     Occupational History     Not on file.     Social History Main Topics     Smoking status: Never Smoker     Smokeless tobacco: Never Used     Alcohol use 0.0 oz/week     0 Standard drinks or equivalent per week      Comment: occasionally     Drug use: No     Sexual activity: Yes     Partners: Male     Other Topics Concern     Parent/Sibling W/ Cabg, Mi Or Angioplasty Before 65f 55m? No      Service No     Blood Transfusions No     Caffeine Concern No     Occupational Exposure No     Hobby Hazards No     Sleep Concern Yes     Stress Concern No     Weight Concern Yes     Special Diet No     Back Care No     Exercise No     Bike Helmet No     Seat Belt No     Self-Exams No     Social History Narrative       Allergies:  No known drug allergies    Current Outpatient Prescriptions   Medication Sig Dispense Refill     HYDROcodone-acetaminophen (NORCO) 5-325 MG per tablet Take 1-2 tablets by mouth every 6 hours as needed for moderate to severe pain 30 tablet 0     FLUoxetine (PROZAC) 10 MG capsule TAKE 1 CAPSULE BY MOUTH  DAILY DUE FOR AN ANNUAL  VISIT IN JULY. PLEASE CALL  CLINIC TO SCHEDULE. 90 capsule 3     cetirizine (ZYRTEC) 10 MG tablet Take 10 mg by mouth daily       cholecalciferol (D 2000) 2000 UNITS tablet Take 2,000 Units by mouth       Multiple Vitamin (MULTI-VITAMINS) TABS Take 1 tablet by mouth       phentermine (ADIPEX-P) 37.5 MG tablet Take 1 tablet (37.5 mg) by mouth every morning (before breakfast) 31 tablet 2     levothyroxine (SYNTHROID/LEVOTHROID) 25 MCG tablet Take 1 tablet (25 mcg) by mouth daily 90 tablet 3     SUMAtriptan (IMITREX) 100 MG tablet Take 1 tablet (100 mg) by mouth at onset of headache for migraine May repeat in 2 hours if needed: max 2/day; average number of headaches  2 yearly 9 tablet 1       Review  Of Systems   ROS: 10 point ROS neg other than the symptoms noted above in the HPI.    Genitourinary: POSITIVE for fibroid on right ovary and Menorrhagia.    This document serves as a record of the services and decisions personally performed and made by Neptali Fournier M.D. It was created on his behalf by Chitra Snyder, a trained medical scribe. The creation of this document is based the provider's statements to the medical scribe.  Chitra Snyder August 3, 2017 10:28 AM     Exam:  /72  Pulse 80  Wt 73.5 kg (162 lb)  LMP 07/08/2017  SpO2 98%  BMI 29.63 kg/m2  Constitutional: healthy, alert and no distress  Head: Normocephalic. No masses, lesions, tenderness or abnormalities, no sinus tenderness  Neck: Neck supple. No adenopathy. Thyroid symmetric, normal size  Breast:  breasts symmetric, no dominant or suspicious mass, no skin or nipple changes or no axillary adenopathy  Cardiovascular: PMI normal. No lifts, heaves, or thrills. RRR. No murmurs, clicks gallops or rub  Respiratory: Percussion normal. Good diaphragmatic excursion. Lungs clear no CVA-T  Gastrointestinal: Abdomen soft, non-tender. BS normal. No masses, organomegaly  Genitourinary: Pelvic Exam:  External Genitalia:     Normal appearance for age, no discharge present, no tenderness present, no inflammatory lesions present, color normal  Vagina:     Normal vaginal vault without central or paravaginal defects, no discharge present, no inflammatory lesions present, no masses present  Bladder:     Nontender to palpation  Urethra:   Urethral Body:  Urethra palpation normal, urethra structural support normal   Urethral Meatus:  No erythema or lesions present  Cervix:     Appearance healthy, no lesions present, nontender to palpation, no bleeding present  Uterus:     Nontender to palpation, position anteflexed, mobility: normal. Uterus consistent with a 14-16 week size. Fibroid on right side with a diameter of 5 cm and is prominent.  Adnexa:     No adnexal  tenderness present, no adnexal masses present  Perineum:     Perineum within normal limits, no evidence of trauma, no rashes or skin lesions present  Anus:     Anus within normal limits, no hemorrhoids present  Inguinal Lymph Nodes:     No lymphadenopathy present  Pubic Hair:     Normal pubic hair distribution for age  Genitalia and Groin:     No rashes present, no lesions present, no areas of discoloration, no masses present  Musculoskeletalextremities normal- no gross deformities noted, gait normal and normal muscle tone, no CVA tenderness  Integument: no suspicious lesions or rashes  Neurologic: Gait normal. Sensation grossly WNL. cranial nerves 2-12 intact   Psychiatric: mentation appears normal and affect normal/bright       Assessment/Plan:  (N92.0) Menorrhagia with regular cycle  (primary encounter diagnosis)  Comment: Pt experiences heavy menstrual cycles that keep her from her daily activities.  Plan: US and sonohysterogram and endometrial bx ordered , CBC pending. Follow up with pt with results to discuss further plans. Risks, benefits, and alternative modes of therapy discussed at length. Pathophysiology of the disease process reviewed, all of the patients questions answered and informed consent obtained.      The information in this document, created by the medical scribe for me, accurately reflects the services I personally performed and the decisions made by me. I have reviewed and approved this document for accuracy prior to leaving the patient care area.  8/3/2017 10:28 AM            Neptali Fournier M.D.

## 2017-08-03 NOTE — NURSING NOTE
"Chief Complaint   Patient presents with     Consult     Fibroid       Initial /72  Pulse 80  Wt 162 lb (73.5 kg)  LMP 2017  SpO2 98%  BMI 29.63 kg/m2 Estimated body mass index is 29.63 kg/(m^2) as calculated from the following:    Height as of 17: 5' 2\" (1.575 m).    Weight as of this encounter: 162 lb (73.5 kg).  BP completed using cuff size: regular        The following HM Due: NONE      The following patient reported/Care Every where data was sent to:  P ABSTRACT QUALITY INITIATIVES [52292]     Pt had CAT scan on 2017  Periods are heavy      Isa Peña CMA                "

## 2017-08-03 NOTE — MR AVS SNAPSHOT
After Visit Summary   8/3/2017    Sha Vidal    MRN: [id number]           Patient Information     Date Of Birth          1970        Visit Information        Provider Department      8/3/2017 10:00 AM Neptali Fournier MD [facility]        Today's Diagnoses     Menorrhagia with regular cycle    -  1    Uterine leiomyoma, unspecified location          Care Instructions    You can reach your [facility] Care Team any time of the day by calling [phone]. This number will put you in touch with the 24 hour nurse line if the clinic is closed.    To contact your OB/GYN Surgery Scheduler please call [phone]. This is a direct number for your care team between 8 a.m. and 4 p.m. Monday through Friday.    [facility] is open for your convenience: [phone]  Monday through Friday 8 a.m. to 8:30 p.m.  Saturday 9 a.m. to 6 p.m.  Sunday Noon to 6 p.m.    They are closed on all major holidays.              Follow-ups after your visit        Follow-up notes from your care team     Return in about 1 week (around 8/10/2017).      Your next 10 appointments already scheduled     Aug 07, 2017  2:45 PM CDT   US PELVIC COMPLETE W TRANSVAGINAL with [id number]   [facility] ([facility]    [address] [place] [id number] [phone]           Please bring a list of your medicines (including vitamins, minerals and over-the-counter drugs). Also, tell your doctor about any allergies you may have. Wear comfortable clothes and leave your valuables at home.  Adults: Drink six 8-ounce glasses of fluid one hour before your exam. Do NOT empty your bladder.  If you need to empty your bladder before your exam, try to release only a little bit of urine. Then, drink another 8oz glass of fluid.  Children: Children who are potty trained should drink at least 4 cups (32 oz) of liquid 45 minutes to one hour prior to the exam. The child s  bladder must be full in order to achieve a diagnostic exam. If your child is very uncomfortable or has an urgent need to pee, please notify a technologist; they will try to find out how much longer the wait may be and provide instructions to help relieve the pressure. Occasionally it is medically necessary to insert a urinary catheter to fill the bladder.  Please call the Imaging Department at your exam site with any questions.            Aug 15, 2017  7:45 AM CDT   [facility] Same Day Surgery with Damon Hall MD, Dave Katz PA-C   [facility] ([facility])    [facility] ([facility])   [phone]            Aug 15, 2017   Procedure with Damon Hall MD   [facility] (--)    [address] [facility] [id number] [phone]              Future tests that were ordered for you today     Open Future Orders        Priority Expected Expires Ordered    US Pelvic Complete with Transvaginal Routine  8/3/2018 8/3/2017            Who to contact     If you have questions or need follow up information about today's clinic visit or your schedule please contact [facility] directly at [phone].  Normal or non-critical lab and imaging results will be communicated to you by [employer], letter or phone within 4 business days after the clinic has received the results. If you do not hear from us within 7 days, please contact the clinic through [employer] or phone. If you have a critical or abnormal lab result, we will notify you by phone as soon as possible.  Submit refill requests through [employer] or call your pharmacy and they will forward the refill request to us. Please allow 3 business days for your refill to be completed.          Additional Information About Your Visit        [employer] Information     [employer] gives you secure access to your electronic health  record. If you see a primary care provider, you can also send messages to your care team and make appointments. If you have questions, please call your primary care clinic.  If you do not have a primary care provider, please call [phone] and they will assist you.        Care EveryWhere ID     This is your Care EveryWhere ID. This could be used by other organizations to access your [facility] medical records  [id number]        Your Vitals Were     Pulse Last Period Pulse Oximetry BMI (Body Mass Index)          80 07/08/2017 98% 29.63 kg/m2         Blood Pressure from Last 3 Encounters:   08/03/17 108/72   08/01/17 124/62   07/24/17 128/64    Weight from Last 3 Encounters:   08/03/17 162 lb (73.5 kg)   08/01/17 159 lb 4.8 oz (72.3 kg)   07/24/17 159 lb (72.1 kg)              We Performed the Following     CBC with platelets and differential        Primary Care Provider Office Phone # Fax #    Anita Quiros PA-C [phone]       [facility] [id number] [facility] [address]        Equal Access to Services     ROBSON CONLEY AH: Hadii aad ku hadasho Soomaali, waaxda luqadaha, qaybta kaalmada adeegyada, waxay idiin hayaan adeeg kharash la'orionn ah. So [facility] [phone].    ATENCIÓN: Si habla español, tiene a collins disposición servicios gratuitos de asistencia lingüística. Llame al [phone].    We comply with applicable federal civil rights laws and [place] laws. We do not discriminate on the basis of race, color, national origin, age, disability sex, sexual orientation or gender identity.            Thank you!     Thank you for choosing [facility]  for your care. Our goal is always to provide you with excellent care. Hearing back from our patients is one way we can continue to improve our services. Please take a few minutes to complete the written survey that you may receive in the mail after your visit with us. Thank you!             Your Updated  Medication List - Protect others around you: Learn how to safely use, store and throw away your medicines at www.disposemymeds.org.          This list is accurate as of: 8/3/17  1:39 PM.  Always use your most recent med list.                   Brand Name Dispense Instructions for use Diagnosis    cetirizine 10 MG tablet    zyrTEC     Take 10 mg by mouth daily        D 2000 2000 UNITS tablet   Generic drug:  cholecalciferol      Take 2,000 Units by mouth        FLUoxetine 10 MG capsule    PROzac    90 capsule    TAKE 1 CAPSULE BY MOUTH  DAILY DUE FOR AN ANNUAL  VISIT IN JULY. PLEASE CALL  CLINIC TO SCHEDULE.    Adjustment disorder with mixed anxiety and depressed mood       HYDROcodone-acetaminophen 5-325 MG per tablet    NORCO    30 tablet    Take 1-2 tablets by mouth every 6 hours as needed for moderate to severe pain    Preop general physical exam, Incisional hernia, without obstruction or gangrene       levothyroxine 25 MCG tablet    SYNTHROID/LEVOTHROID    90 tablet    Take 1 tablet (25 mcg) by mouth daily    Hypothyroidism, unspecified type       MULTI-VITAMINS Tabs      Take 1 tablet by mouth        phentermine 37.5 MG tablet    ADIPEX-P    31 tablet    Take 1 tablet (37.5 mg) by mouth every morning (before breakfast)    Hypothyroidism due to Hashimoto's thyroiditis, CARDIOVASCULAR SCREENING; LDL GOAL LESS THAN 160, Family history of diabetes mellitus       SUMAtriptan 100 MG tablet    IMITREX    9 tablet    Take 1 tablet (100 mg) by mouth at onset of headache for migraine May repeat in 2 hours if needed: max 2/day; average number of headaches  2 yearly    Migraine without aura and without status migrainosus, not intractable

## 2017-08-07 ENCOUNTER — RADIANT APPOINTMENT (OUTPATIENT)
Dept: ULTRASOUND IMAGING | Facility: CLINIC | Age: 47
End: 2017-08-07
Attending: OBSTETRICS & GYNECOLOGY
Payer: COMMERCIAL

## 2017-08-07 DIAGNOSIS — N92.0 MENORRHAGIA WITH REGULAR CYCLE: ICD-10-CM

## 2017-08-07 DIAGNOSIS — D25.9 UTERINE LEIOMYOMA, UNSPECIFIED LOCATION: ICD-10-CM

## 2017-08-07 PROCEDURE — 76856 US EXAM PELVIC COMPLETE: CPT | Performed by: OBSTETRICS & GYNECOLOGY

## 2017-08-07 PROCEDURE — 76830 TRANSVAGINAL US NON-OB: CPT | Performed by: OBSTETRICS & GYNECOLOGY

## 2017-08-09 NOTE — PROGRESS NOTES
Please contact pt to help her schedule an appointment   See the my chart message  Thanks  Neptali Fournier M.D.

## 2017-08-09 NOTE — PROGRESS NOTES
Sha, all your ultrasound results suggest that there is another uterine fibroid that effects the lining of the uterine cavity.  Certainly this can cause the heavy flow that you have been experiencing  There are a number of very good options to help correct this and get you back to a more normal pattern..  I would like to see you in the office to go over these options.  My staff can help you schedule an appointment at your convenience  thanks  Neptali Fournier M.D.

## 2017-08-14 NOTE — H&P (VIEW-ONLY)
[facility]  [address] [facility] [id number] [phone]  Dept: [phone]    PRE-OP EVALUATION:  Today's date: 2017    Sha Vidal (: 1970) presents for pre-operative evaluation assessment as requested by Dr. Hall.  She requires evaluation and anesthesia risk assessment prior to undergoing surgery/procedure for treatment .  Proposed procedure: Hernia Repair     Date of Surgery/ Procedure: 08/15/17  Time of Surgery/ Procedure: 7:00am  Hospital/Surgical Facility: [facility]  Primary Physician: Anita Quiros  Type of Anesthesia Anticipated: General    Patient has a Health Care Directive or Living Will:  NO    Preop Questions 2017   1.  Do you have a history of heart attack, stroke, stent, bypass or surgery on an artery in the head, neck, heart or legs? No   2.  Do you ever have any pain or discomfort in your chest? No   3.  Do you have a history of  Heart Failure? No   4.   Are you troubled by shortness of breath when:  walking on a level surface, or up a slight hill, or at night? No   5.  Do you currently have a cold, bronchitis or other respiratory infection? No   6.  Do you have a cough, shortness of breath, or wheezing? No   7.  Do you sometimes get pains in the calves of your legs when you walk? No   8. Do you or anyone in your family have previous history of blood clots? No   9.  Do you or does anyone in your family have a serious bleeding problem such as prolonged bleeding following surgeries or cuts? No   10. Have you ever had problems with anemia or been told to take iron pills? No   11. Have you had any abnormal blood loss such as black, tarry or bloody stools, or abnormal vaginal bleeding? No   12. Have you ever had a blood transfusion? No   13. Have you or any of your relatives ever had problems with anesthesia? No   14. Do you have sleep apnea, excessive snoring or daytime drowsiness? No   15. Do you have any prosthetic heart valves? No    16. Do you have prosthetic joints? No   17. Is there any chance that you may be pregnant? No           HPI:                                                      Brief HPI related to upcoming procedure: history of three hernias, repairing all three hernias, mesh procedure.      DEPRESSION - Patient has a long history of Depression of moderate severity requiring medication for control with recent symptoms being stable..Current symptoms of depression include none.                                                                                                                                                                                    .  HYPOTHYROIDISM - Patient has a longstanding history of chronic Hypothyroidism. Patient has been doing well, noting no tremor, insomnia, hair loss or changes in skin texture. Last TSH value of 2.93. Continues to take medications as directed, without adverse reactions or side effects.                                                                                                                                                                                                                        .    MEDICAL HISTORY:                                                    Patient Active Problem List    Diagnosis Date Noted     Vitiligo 07/17/2017     Priority: Medium     Supraumbilical hernia 07/17/2017     Priority: Medium     Uterine leiomyoma, unspecified location 07/17/2017     Priority: Medium     Adjustment disorder with mixed anxiety and depressed mood 07/17/2017     Priority: Medium     Overweight 07/28/2015     Priority: Medium     Migraine headache 02/19/2013     Priority: Medium     CARDIOVASCULAR SCREENING; LDL GOAL LESS THAN 160 10/31/2010     Priority: Medium     Hypothyroidism 11/18/2009     Priority: Medium      Past Medical History:   Diagnosis Date     Allergic rhinitis, cause unspecified      Bleeding disorder (H)     factor 12     CARDIOVASCULAR SCREENING; LDL GOAL  LESS THAN 160 10/31/2010     Hashimoto's thyroiditis      Lateral epicondylitis 3/13/2009     Migraine headache 2/19/2013     NONSPECIFIC MEDICAL HISTORY 2002    Factor XII deficiency (asymptomatic)     NONSPECIFIC MEDICAL HISTORY 2002    GRAYSON positive 1:320, seen by Rheum (negative PHUONG abebe)     Past Surgical History:   Procedure Laterality Date     ABDOMEN SURGERY      10/2007     APPENDECTOMY      10/2007     C NONSPECIFIC PROCEDURE      wisdom teeth extraction     C NONSPECIFIC PROCEDURE      Bilateral tubal ligation     C NONSPECIFIC PROCEDURE  1/01    Lasik     C NONSPECIFIC PROCEDURE      explo lap for ARMANDO for SBO     Current Outpatient Prescriptions   Medication Sig Dispense Refill     HYDROcodone-acetaminophen (NORCO) 5-325 MG per tablet Take 1-2 tablets by mouth every 6 hours as needed for moderate to severe pain 20 tablet 0     FLUoxetine (PROZAC) 10 MG capsule TAKE 1 CAPSULE BY MOUTH  DAILY DUE FOR AN ANNUAL  VISIT IN JULY. PLEASE CALL  CLINIC TO SCHEDULE. 90 capsule 3     cetirizine (ZYRTEC) 10 MG tablet Take 10 mg by mouth daily       cholecalciferol (D 2000) 2000 UNITS tablet Take 2,000 Units by mouth       Multiple Vitamin (MULTI-VITAMINS) TABS Take 1 tablet by mouth       levothyroxine (SYNTHROID/LEVOTHROID) 25 MCG tablet Take 1 tablet (25 mcg) by mouth daily 90 tablet 3     phentermine (ADIPEX-P) 37.5 MG tablet Take 1 tablet (37.5 mg) by mouth every morning (before breakfast) (Patient not taking: Reported on 8/1/2017) 31 tablet 2     SUMAtriptan (IMITREX) 100 MG tablet Take 1 tablet (100 mg) by mouth at onset of headache for migraine May repeat in 2 hours if needed: max 2/day; average number of headaches  2 yearly (Patient not taking: Reported on 8/1/2017) 9 tablet 1     OTC products: None, except as noted above, no recent use of OTC ASA, NSAIDS or Steroids and no use of herbal medications or other supplements    Allergies   Allergen Reactions     No Known Drug Allergies       Latex Allergy:  "NO    Social History   Substance Use Topics     Smoking status: Never Smoker     Smokeless tobacco: Never Used     Alcohol use 0.0 oz/week     0 Standard drinks or equivalent per week      Comment: occasionally     History   Drug Use No       REVIEW OF SYSTEMS:                                                    C: NEGATIVE for fever, chills, change in weight  I: NEGATIVE for worrisome rashes, moles or lesions  E: NEGATIVE for vision changes or irritation  E/M: NEGATIVE for ear, mouth and throat problems  R: NEGATIVE for significant cough or SOB  B: NEGATIVE for masses, tenderness or discharge  CV: NEGATIVE for chest pain, palpitations or peripheral edema  GI: NEGATIVE for nausea, + abdominal pain at central abdomen, heartburn, or change in bowel habits  : NEGATIVE for frequency, dysuria, or hematuria  M: NEGATIVE for significant arthralgias or myalgia  N: NEGATIVE for weakness, dizziness or paresthesias  E: NEGATIVE for temperature intolerance, skin/hair changes  H: NEGATIVE for bleeding problems  P: NEGATIVE for changes in mood or affect    EXAM:                                                    /62 (BP Location: Right arm, Patient Position: Chair, Cuff Size: Adult Large)  Pulse 68  Temp 98.1  F (36.7  C) (Tympanic)  Resp 16  Ht 5' 2\" (1.575 m)  Wt 159 lb 4.8 oz (72.3 kg)  LMP 07/08/2017  SpO2 100%  BMI 29.14 kg/m2    GENERAL APPEARANCE: healthy, alert and no distress     EYES: EOMI, PERRL     HENT: ear canals and TM's normal and nose and mouth without ulcers or lesions     NECK: no adenopathy, no asymmetry, masses, or scars and thyroid normal to palpation     RESP: lungs clear to auscultation - no rales, rhonchi or wheezes     CV: regular rates and rhythm, normal S1 S2, no S3 or S4 and no murmur, click or rub     ABDOMEN:  soft, nontender, no HSM or masses and bowel sounds normal     MS: extremities normal- no gross deformities noted, no evidence of inflammation in joints, FROM in all " extremities.     SKIN: no suspicious lesions or rashes     NEURO: Normal strength and tone, sensory exam grossly normal, mentation intact and speech normal     PSYCH: mentation appears normal. and affect normal/bright     LYMPHATICS: No axillary, cervical, or supraclavicular nodes    DIAGNOSTICS:                                                    Hemoglobin (indicated for history of anemia or procedure with significant blood loss such as tonsillectomy, major intraperitoneal surgery, vascular surgery, major spine surgery, total joint replacement)    Recent Labs   Lab Test  07/17/17   0907  07/09/17   1925 04/28/17 08/04/16   0748   11/24/09   0641   HGB  11.7  11.3*   --    --    < >   --    PLT  340  320   --    --    < >   --    INR   --    --    --    --    --   1.02   NA  140  137   --    --    < >   --    POTASSIUM  4.4  3.7   --    --    < >   --    CR  0.83  0.90   --    --    < >   --    A1C   --    --   5.0  5.0   --    --     < > = values in this interval not displayed.      Results for orders placed or performed in visit on 08/01/17   Hemoglobin   Result Value Ref Range    Hemoglobin 11.9 11.7 - 15.7 g/dL         IMPRESSION:                                                    Reason for surgery/procedure: Hernia Repair   Diagnosis/reason for consult:hernia, incisional and supraumbilical    The proposed surgical procedure is considered INTERMEDIATE risk.    REVISED CARDIAC RISK INDEX  The patient has the following serious cardiovascular risks for perioperative complications such as (MI, PE, VFib and 3  AV Block):  No serious cardiac risks  INTERPRETATION: 0 risks: Class I (very low risk - 0.4% complication rate)    The patient has the following additional risks for perioperative complications:  No identified additional risks      ICD-10-CM    1. Preop general physical exam Z01.818 HYDROcodone-acetaminophen (NORCO) 5-325 MG per tablet     Hemoglobin   2. Incisional hernia, without obstruction or gangrene  K43.2 HYDROcodone-acetaminophen (NORCO) 5-325 MG per tablet     Hemoglobin   3. Hypothyroidism, unspecified type E03.9    4. Other migraine without status migrainosus, not intractable G43.809    5. Adjustment disorder with mixed anxiety and depressed mood F43.23        RECOMMENDATIONS:                                                      --Consult hospital rounder / IM to assist post-op medical management  --Patient understands NPO recommendations  --Advised no ASA, NSAIDs or Steroids 7-10 prior to procedure, Tylenol (Acetaminopen ok)  --Patient to skip all medications morning of procedure, may resume post op.      APPROVAL GIVEN to proceed with proposed procedure, without further diagnostic evaluation       Signed Electronically by: Anita Quiros PA-C    Copy of this evaluation report is provided to requesting physician.    [facility] Preop Guidelines

## 2017-08-15 ENCOUNTER — SURGERY (OUTPATIENT)
Age: 47
End: 2017-08-15

## 2017-08-15 ENCOUNTER — HOSPITAL ENCOUNTER (OUTPATIENT)
Facility: CLINIC | Age: 47
Discharge: HOME | End: 2017-08-15
Attending: SURGERY | Admitting: SURGERY
Payer: COMMERCIAL

## 2017-08-15 ENCOUNTER — ANESTHESIA EVENT (OUTPATIENT)
Dept: SURGERY | Facility: CLINIC | Age: 47
End: 2017-08-15
Payer: COMMERCIAL

## 2017-08-15 ENCOUNTER — ANESTHESIA (OUTPATIENT)
Dept: SURGERY | Facility: CLINIC | Age: 47
End: 2017-08-15
Payer: COMMERCIAL

## 2017-08-15 ENCOUNTER — APPOINTMENT (OUTPATIENT)
Dept: SURGERY | Facility: PHYSICIAN GROUP | Age: 47
End: 2017-08-15
Payer: COMMERCIAL

## 2017-08-15 VITALS
SYSTOLIC BLOOD PRESSURE: 112 MMHG | HEIGHT: 62 IN | BODY MASS INDEX: 29.63 KG/M2 | WEIGHT: 161 LBS | OXYGEN SATURATION: 94 % | DIASTOLIC BLOOD PRESSURE: 69 MMHG | RESPIRATION RATE: 12 BRPM | HEART RATE: 73 BPM | TEMPERATURE: 97.1 F

## 2017-08-15 DIAGNOSIS — K43.2 INCISIONAL HERNIA, WITHOUT OBSTRUCTION OR GANGRENE: Primary | ICD-10-CM

## 2017-08-15 LAB — HCG UR QL: NEGATIVE

## 2017-08-15 PROCEDURE — 25000128 H RX IP 250 OP 636: Performed by: ANESTHESIOLOGY

## 2017-08-15 PROCEDURE — 25000132 ZZH RX MED GY IP 250 OP 250 PS 637: Performed by: ANESTHESIOLOGY

## 2017-08-15 PROCEDURE — 25000128 H RX IP 250 OP 636: Performed by: SURGERY

## 2017-08-15 PROCEDURE — 71000013 ZZH RECOVERY PHASE 1 LEVEL 1 EA ADDTL HR: Performed by: SURGERY

## 2017-08-15 PROCEDURE — 36000085 ZZH SURGERY LEVEL 8 1ST 30 MIN: Performed by: SURGERY

## 2017-08-15 PROCEDURE — 40000306 ZZH STATISTIC PRE PROC ASSESS II: Performed by: SURGERY

## 2017-08-15 PROCEDURE — 37000009 ZZH ANESTHESIA TECHNICAL FEE, EACH ADDTL 15 MIN: Performed by: SURGERY

## 2017-08-15 PROCEDURE — 25000125 ZZHC RX 250: Performed by: SURGERY

## 2017-08-15 PROCEDURE — 25000566 ZZH SEVOFLURANE, EA 15 MIN: Performed by: SURGERY

## 2017-08-15 PROCEDURE — 37000008 ZZH ANESTHESIA TECHNICAL FEE, 1ST 30 MIN: Performed by: SURGERY

## 2017-08-15 PROCEDURE — 81025 URINE PREGNANCY TEST: CPT | Performed by: ANESTHESIOLOGY

## 2017-08-15 PROCEDURE — 71000012 ZZH RECOVERY PHASE 1 LEVEL 1 FIRST HR: Performed by: SURGERY

## 2017-08-15 PROCEDURE — C1781 MESH (IMPLANTABLE): HCPCS | Performed by: SURGERY

## 2017-08-15 PROCEDURE — 71000027 ZZH RECOVERY PHASE 2 EACH 15 MINS: Performed by: SURGERY

## 2017-08-15 PROCEDURE — 25000132 ZZH RX MED GY IP 250 OP 250 PS 637: Performed by: SURGERY

## 2017-08-15 PROCEDURE — 25000128 H RX IP 250 OP 636: Performed by: NURSE ANESTHETIST, CERTIFIED REGISTERED

## 2017-08-15 PROCEDURE — S2900 ROBOTIC SURGICAL SYSTEM: HCPCS | Performed by: SURGERY

## 2017-08-15 PROCEDURE — 27210794 ZZH OR GENERAL SUPPLY STERILE: Performed by: SURGERY

## 2017-08-15 PROCEDURE — 25000125 ZZHC RX 250: Performed by: NURSE ANESTHETIST, CERTIFIED REGISTERED

## 2017-08-15 PROCEDURE — 49654 ZZHC LAP INCISIONAL HERNIA REPAIR: CPT | Mod: AS | Performed by: PHYSICIAN ASSISTANT

## 2017-08-15 PROCEDURE — 36000087 ZZH SURGERY LEVEL 8 EA 15 ADDTL MIN: Performed by: SURGERY

## 2017-08-15 PROCEDURE — 49654 ZZHC LAP INCISIONAL HERNIA REPAIR: CPT | Performed by: SURGERY

## 2017-08-15 DEVICE — MESH VENTRALIGHT ST SYSTEM 4X6" ELLIPSE 5954460: Type: IMPLANTABLE DEVICE | Site: ABDOMEN | Status: FUNCTIONAL

## 2017-08-15 RX ORDER — CEFAZOLIN SODIUM 2 G/100ML
2 INJECTION, SOLUTION INTRAVENOUS
Status: COMPLETED | OUTPATIENT
Start: 2017-08-15 | End: 2017-08-15

## 2017-08-15 RX ORDER — ONDANSETRON 2 MG/ML
4 INJECTION INTRAMUSCULAR; INTRAVENOUS EVERY 30 MIN PRN
Status: DISCONTINUED | OUTPATIENT
Start: 2017-08-15 | End: 2017-08-15 | Stop reason: HOSPADM

## 2017-08-15 RX ORDER — LIDOCAINE HYDROCHLORIDE 10 MG/ML
INJECTION, SOLUTION INFILTRATION; PERINEURAL PRN
Status: DISCONTINUED | OUTPATIENT
Start: 2017-08-15 | End: 2017-08-15

## 2017-08-15 RX ORDER — KETOROLAC TROMETHAMINE 30 MG/ML
INJECTION, SOLUTION INTRAMUSCULAR; INTRAVENOUS PRN
Status: DISCONTINUED | OUTPATIENT
Start: 2017-08-15 | End: 2017-08-15

## 2017-08-15 RX ORDER — BUPIVACAINE HYDROCHLORIDE AND EPINEPHRINE 5; 5 MG/ML; UG/ML
INJECTION, SOLUTION EPIDURAL; INTRACAUDAL; PERINEURAL PRN
Status: DISCONTINUED | OUTPATIENT
Start: 2017-08-15 | End: 2017-08-15 | Stop reason: HOSPADM

## 2017-08-15 RX ORDER — ACETAMINOPHEN 500 MG
1000 TABLET ORAL ONCE
Status: COMPLETED | OUTPATIENT
Start: 2017-08-15 | End: 2017-08-15

## 2017-08-15 RX ORDER — PROPOFOL 10 MG/ML
INJECTION, EMULSION INTRAVENOUS PRN
Status: DISCONTINUED | OUTPATIENT
Start: 2017-08-15 | End: 2017-08-15

## 2017-08-15 RX ORDER — LIDOCAINE 40 MG/G
CREAM TOPICAL
Status: DISCONTINUED | OUTPATIENT
Start: 2017-08-15 | End: 2017-08-15 | Stop reason: HOSPADM

## 2017-08-15 RX ORDER — GLYCOPYRROLATE 0.2 MG/ML
INJECTION, SOLUTION INTRAMUSCULAR; INTRAVENOUS PRN
Status: DISCONTINUED | OUTPATIENT
Start: 2017-08-15 | End: 2017-08-15

## 2017-08-15 RX ORDER — LABETALOL HYDROCHLORIDE 5 MG/ML
10 INJECTION, SOLUTION INTRAVENOUS EVERY 5 MIN PRN
Status: DISCONTINUED | OUTPATIENT
Start: 2017-08-15 | End: 2017-08-15 | Stop reason: HOSPADM

## 2017-08-15 RX ORDER — SODIUM CHLORIDE, SODIUM LACTATE, POTASSIUM CHLORIDE, CALCIUM CHLORIDE 600; 310; 30; 20 MG/100ML; MG/100ML; MG/100ML; MG/100ML
INJECTION, SOLUTION INTRAVENOUS CONTINUOUS
Status: DISCONTINUED | OUTPATIENT
Start: 2017-08-15 | End: 2017-08-15 | Stop reason: HOSPADM

## 2017-08-15 RX ORDER — ONDANSETRON 2 MG/ML
INJECTION INTRAMUSCULAR; INTRAVENOUS PRN
Status: DISCONTINUED | OUTPATIENT
Start: 2017-08-15 | End: 2017-08-15

## 2017-08-15 RX ORDER — OXYCODONE HYDROCHLORIDE 5 MG/1
5-10 TABLET ORAL
Status: COMPLETED | OUTPATIENT
Start: 2017-08-15 | End: 2017-08-15

## 2017-08-15 RX ORDER — FENTANYL CITRATE 50 UG/ML
25-50 INJECTION, SOLUTION INTRAMUSCULAR; INTRAVENOUS
Status: DISCONTINUED | OUTPATIENT
Start: 2017-08-15 | End: 2017-08-15 | Stop reason: HOSPADM

## 2017-08-15 RX ORDER — OXYCODONE HYDROCHLORIDE 5 MG/1
5-10 TABLET ORAL
Qty: 30 TABLET | Refills: 0 | Status: SHIPPED | OUTPATIENT
Start: 2017-08-15 | End: 2017-08-21

## 2017-08-15 RX ORDER — CEFAZOLIN SODIUM 1 G/3ML
1 INJECTION, POWDER, FOR SOLUTION INTRAMUSCULAR; INTRAVENOUS SEE ADMIN INSTRUCTIONS
Status: DISCONTINUED | OUTPATIENT
Start: 2017-08-15 | End: 2017-08-15 | Stop reason: HOSPADM

## 2017-08-15 RX ORDER — ONDANSETRON 4 MG/1
4 TABLET, ORALLY DISINTEGRATING ORAL EVERY 30 MIN PRN
Status: DISCONTINUED | OUTPATIENT
Start: 2017-08-15 | End: 2017-08-15 | Stop reason: HOSPADM

## 2017-08-15 RX ORDER — MEPERIDINE HYDROCHLORIDE 25 MG/ML
12.5 INJECTION INTRAMUSCULAR; INTRAVENOUS; SUBCUTANEOUS
Status: DISCONTINUED | OUTPATIENT
Start: 2017-08-15 | End: 2017-08-15 | Stop reason: HOSPADM

## 2017-08-15 RX ORDER — NALOXONE HYDROCHLORIDE 0.4 MG/ML
.1-.4 INJECTION, SOLUTION INTRAMUSCULAR; INTRAVENOUS; SUBCUTANEOUS
Status: DISCONTINUED | OUTPATIENT
Start: 2017-08-15 | End: 2017-08-15 | Stop reason: HOSPADM

## 2017-08-15 RX ORDER — NEOSTIGMINE METHYLSULFATE 1 MG/ML
VIAL (ML) INJECTION PRN
Status: DISCONTINUED | OUTPATIENT
Start: 2017-08-15 | End: 2017-08-15

## 2017-08-15 RX ORDER — DEXAMETHASONE SODIUM PHOSPHATE 4 MG/ML
INJECTION, SOLUTION INTRA-ARTICULAR; INTRALESIONAL; INTRAMUSCULAR; INTRAVENOUS; SOFT TISSUE PRN
Status: DISCONTINUED | OUTPATIENT
Start: 2017-08-15 | End: 2017-08-15

## 2017-08-15 RX ORDER — FENTANYL CITRATE 50 UG/ML
INJECTION, SOLUTION INTRAMUSCULAR; INTRAVENOUS PRN
Status: DISCONTINUED | OUTPATIENT
Start: 2017-08-15 | End: 2017-08-15

## 2017-08-15 RX ADMIN — ROCURONIUM BROMIDE 10 MG: 10 INJECTION INTRAVENOUS at 10:51

## 2017-08-15 RX ADMIN — ROCURONIUM BROMIDE 10 MG: 10 INJECTION INTRAVENOUS at 09:56

## 2017-08-15 RX ADMIN — ROCURONIUM BROMIDE 10 MG: 10 INJECTION INTRAVENOUS at 11:28

## 2017-08-15 RX ADMIN — HYDROMORPHONE HYDROCHLORIDE 0.5 MG: 1 INJECTION, SOLUTION INTRAMUSCULAR; INTRAVENOUS; SUBCUTANEOUS at 08:59

## 2017-08-15 RX ADMIN — DEXAMETHASONE SODIUM PHOSPHATE 6 MG: 4 INJECTION, SOLUTION INTRA-ARTICULAR; INTRALESIONAL; INTRAMUSCULAR; INTRAVENOUS; SOFT TISSUE at 08:28

## 2017-08-15 RX ADMIN — FENTANYL CITRATE 50 MCG: 50 INJECTION, SOLUTION INTRAMUSCULAR; INTRAVENOUS at 09:57

## 2017-08-15 RX ADMIN — ONDANSETRON 4 MG: 2 INJECTION INTRAMUSCULAR; INTRAVENOUS at 11:36

## 2017-08-15 RX ADMIN — SODIUM CHLORIDE, POTASSIUM CHLORIDE, SODIUM LACTATE AND CALCIUM CHLORIDE: 600; 310; 30; 20 INJECTION, SOLUTION INTRAVENOUS at 09:45

## 2017-08-15 RX ADMIN — LIDOCAINE HYDROCHLORIDE 50 MG: 10 INJECTION, SOLUTION INFILTRATION; PERINEURAL at 08:27

## 2017-08-15 RX ADMIN — SODIUM CHLORIDE, POTASSIUM CHLORIDE, SODIUM LACTATE AND CALCIUM CHLORIDE: 600; 310; 30; 20 INJECTION, SOLUTION INTRAVENOUS at 08:19

## 2017-08-15 RX ADMIN — FENTANYL CITRATE 50 MCG: 50 INJECTION INTRAMUSCULAR; INTRAVENOUS at 12:14

## 2017-08-15 RX ADMIN — CEFAZOLIN SODIUM 1 G: 2 INJECTION, SOLUTION INTRAVENOUS at 10:26

## 2017-08-15 RX ADMIN — HYDROMORPHONE HYDROCHLORIDE 0.5 MG: 1 INJECTION, SOLUTION INTRAMUSCULAR; INTRAVENOUS; SUBCUTANEOUS at 12:42

## 2017-08-15 RX ADMIN — OXYCODONE HYDROCHLORIDE 5 MG: 5 TABLET ORAL at 13:08

## 2017-08-15 RX ADMIN — HYDROMORPHONE HYDROCHLORIDE 0.5 MG: 1 INJECTION, SOLUTION INTRAMUSCULAR; INTRAVENOUS; SUBCUTANEOUS at 12:30

## 2017-08-15 RX ADMIN — ROCURONIUM BROMIDE 20 MG: 10 INJECTION INTRAVENOUS at 09:28

## 2017-08-15 RX ADMIN — CEFAZOLIN SODIUM 2 G: 2 INJECTION, SOLUTION INTRAVENOUS at 08:30

## 2017-08-15 RX ADMIN — MIDAZOLAM HYDROCHLORIDE 2 MG: 1 INJECTION, SOLUTION INTRAMUSCULAR; INTRAVENOUS at 07:59

## 2017-08-15 RX ADMIN — ROCURONIUM BROMIDE 10 MG: 10 INJECTION INTRAVENOUS at 10:19

## 2017-08-15 RX ADMIN — PROPOFOL 200 MG: 10 INJECTION, EMULSION INTRAVENOUS at 08:27

## 2017-08-15 RX ADMIN — FENTANYL CITRATE 50 MCG: 50 INJECTION INTRAMUSCULAR; INTRAVENOUS at 12:42

## 2017-08-15 RX ADMIN — ROCURONIUM BROMIDE 10 MG: 10 INJECTION INTRAVENOUS at 09:29

## 2017-08-15 RX ADMIN — Medication 4 MG: at 11:39

## 2017-08-15 RX ADMIN — SODIUM CHLORIDE, POTASSIUM CHLORIDE, SODIUM LACTATE AND CALCIUM CHLORIDE: 600; 310; 30; 20 INJECTION, SOLUTION INTRAVENOUS at 12:45

## 2017-08-15 RX ADMIN — GLYCOPYRROLATE 0.6 MG: 0.2 INJECTION, SOLUTION INTRAMUSCULAR; INTRAVENOUS at 11:39

## 2017-08-15 RX ADMIN — ACETAMINOPHEN 1000 MG: 500 TABLET, FILM COATED ORAL at 07:32

## 2017-08-15 RX ADMIN — KETOROLAC TROMETHAMINE 30 MG: 30 INJECTION, SOLUTION INTRAMUSCULAR at 11:37

## 2017-08-15 RX ADMIN — ROCURONIUM BROMIDE 50 MG: 10 INJECTION INTRAVENOUS at 08:28

## 2017-08-15 RX ADMIN — FENTANYL CITRATE 50 MCG: 50 INJECTION, SOLUTION INTRAMUSCULAR; INTRAVENOUS at 08:49

## 2017-08-15 RX ADMIN — BUPIVACAINE HYDROCHLORIDE AND EPINEPHRINE BITARTRATE 20 ML: 5; .005 INJECTION, SOLUTION EPIDURAL; INTRACAUDAL; PERINEURAL at 11:49

## 2017-08-15 RX ADMIN — FENTANYL CITRATE 100 MCG: 50 INJECTION, SOLUTION INTRAMUSCULAR; INTRAVENOUS at 08:27

## 2017-08-15 RX ADMIN — FENTANYL CITRATE 50 MCG: 50 INJECTION, SOLUTION INTRAMUSCULAR; INTRAVENOUS at 10:20

## 2017-08-15 RX ADMIN — ONDANSETRON 4 MG: 2 INJECTION INTRAMUSCULAR; INTRAVENOUS at 13:48

## 2017-08-15 RX ADMIN — HYDROMORPHONE HYDROCHLORIDE 0.5 MG: 1 INJECTION, SOLUTION INTRAMUSCULAR; INTRAVENOUS; SUBCUTANEOUS at 09:46

## 2017-08-15 RX ADMIN — ROCURONIUM BROMIDE 10 MG: 10 INJECTION INTRAVENOUS at 10:23

## 2017-08-15 NOTE — ANESTHESIA POSTPROCEDURE EVALUATION
Patient: Sha Vidal    Procedure(s):  Robotic assisted incisional and supra-umbilical  hernia repair with mesh - Wound Class: II-Clean Contaminated    Diagnosis:incisional hernia with mesh  Diagnosis Additional Information: incisional hernia    Anesthesia Type:  General, ETT    Note:  Anesthesia Post Evaluation    Patient location during evaluation: PACU  Patient participation: Able to fully participate in evaluation  Level of consciousness: awake and alert  Pain management: adequate  Airway patency: patent  Cardiovascular status: acceptable  Respiratory status: acceptable  Hydration status: acceptable  PONV: none     Anesthetic complications: None          Last vitals:  Vitals:    08/15/17 1205 08/15/17 1209 08/15/17 1215   BP: 140/80 140/80 125/80   Pulse:      Resp: 11 15 13   Temp:      SpO2: 100% 100% 100%         Electronically Signed By: Rudy Rodriguez MD  August 15, 2017  12:21 PM

## 2017-08-15 NOTE — OP NOTE
General Surgery Operative Note    Pre-operative diagnosis:  incisional hernia   Post-operative diagnosis: same   Procedure:  robotic-assisted incisional hernia repair with mesh.     Surgeon: Damon Hall MD   Assistant(s): Dave Katz PA-C - the physician assistant was medically necessary to assist in prepping, positioning, camera operation, retraction/exposure and instrument exchange.    Anesthesia: General    Estimated blood loss: 5 cc's   Drains placed: None   Complications:  None   Findings:   multiple incisional defects along the midline running from the umbilicus to approximately 8 cm above the umbilicus.  Repair was achieved using a preperitoneal piece of Ventralight ST mesh.       Indications for operation: A 47-year-old woman who has developed a couple of hernias at a supraumbilical incision.  Repair was recommended with robotic assistance.  The procedure, along with its risks and complications, was discussed with the patient.  She agreed to proceed.    Details of the operation: After informed consent, the patient was taken to the operating room where she underwent satisfactory induction of general anesthesia.  The patient was sterilely prepped and draped and a left upper quadrant incision was made.  A 5 mm optical entry port was now used to enter the abdomen without difficulty.  Pneumoperitoneum was achieved using CO2 insufflation, and under direct visualization three robotic 8 mm ports were placed on the right side.  The robot was docked.  I now proceeded to the robotic console.  The position was selected well out to the side on the patient's right.  The peritoneum was scored and the preperitoneal space was developed towards the midline.  The peritoneum was extremely thin out laterally.  Eventually, we are able to get into the preperitoneal space nicely close to the midline.  There was a large bulge of preperitoneal fat at the superior aspect of the old incision.  There was a smaller bulge  between the umbilicus and the superior defect, and there was a small defect near the umbilicus.  The entire area appeared generally weak.  Once the preperitoneal space was fully developed, the hernia defects were close in the midline using a running 0 V lock suture.  Two of these sutures were used to close the three defects.  A piece of Ventralight ST mesh was selected in the 10 x 15 cm size.  The edges were trimmed slightly to narrow it and the mesh was then placed into the abdomen.  This was then positioned so that it covered all of the defects.  Sutures of 2-0 Vicryl were placed along the midline to hold the central portion of the mesh in place.  These went through the mesh and up into the posterior fascia.  Additional sutures were placed around the periphery of the mesh to hold it up well circumferentially.  The peritoneum was now closed on the right side using a running 3-0 V lock suture.  Some openings within the peritoneum were closed using 2-0 Vicryl sutures.  On the right side, there was a small area where some of the mesh was exposed.  The peritoneum was closed down to the mesh using a 2-0 Vicryl suture.  The entire periphery of the mesh was covered nicely with peritoneum.  There was excellent hemostasis.  Pneumoperitoneum was now released and the trochars were removed.  Skin incisions were closed using 4-0 subcuticular Vicryl followed by Steri-Strips.      The patient tolerated the procedure well and was transferred to the recovery room in satisfactory condition.  Sponge and needle counts were correct at the close of the case.    Specimens: * No specimens in log *        Damon Hall MD

## 2017-08-15 NOTE — DISCHARGE INSTRUCTIONS
HOME CARE FOLLOWING HERNIA REPAIR  CLAUDETTE Zavaleta, ORLANDO Mata, CLAUDETTE Fernandez, GEGE Guillaume & Joni    DIET:  No restrictions. Increased fluid intake is recommended. While taking pain medications, increase dietary fiber or add a fiber supplementation like Metamucil or Citrucel to help prevent constipation - a possible side effect of pain medications.  If taking Metamucil or Citrucel, take with plenty of fluids as instructed.    NAUSEA:  If nauseated from the anesthetic/pain meds; rest in bed, get up cautiously with assistance, and drink clear liquids (juice, tea, broth).    ACTIVITY:  Light Activity -- you may immediately be up and about as tolerated.  Driving -- you may drive when comfortable and off narcotic pain medications.  Light Work -- resume when comfortable off pain medications.  (If you can drive, you probably can work.)  Strenuous Work/Activity -- limit lifting to 20 pounds for 4 weeks.  Active Sports (running, biking, etc.) -- cautiously resume after 3 weeks.    INCISIONAL CARE:    If you have a dressing in place, keep clean and dry for 48 hours; you may replace the gauze if it becomes soiled.    After 48 hours you may remove the dressing and shower.  Do not submerse incision in water for 1 week.    If you have a Dermabond dressing (a type of skin glue), you may shower immediately.    Sutures will absorb and need not be removed.    If present, leave the steri-strips (white paper tapes) in place until they fall off.    If present, leave Dermabond glue in place until it wears/flakes off.    Expect a variable amount of swelling/black and blue discoloration that may involve the penis/scrotum or labia.    Some numbness around the incision is common.    A lump/ridge under the incision is normal and will gradually resolve.    DISCOMFORT:  Local anesthetic placed at surgery should provide relief for 4-8 hours.  Begin taking pain pills before discomfort is severe.  Take the pain  medication with some food, when possible, to minimize side effects.  Intermittent use of ice packs to the hernia repair site may help during the first 48 hours.  Expect gradual improvement.    RETURN APPOINTMENT:  Schedule a follow-up visit 1-3 weeks post-op (you may do this any time after surgery is scheduled).  Office Phone:  [phone]    CONTACT US IF THE FOLLOWING DEVELOPS:  1.  A fever that is above 101    2.  If there is a large amount of drainage, bleeding, or swelling.  3.  Severe pain that is not relieved by your prescription.  4.  Drainage that is thick, cloudy, yellow, green or white.  5.  Any other questions not answered by  Frequently Asked Questions  sheet.        FREQUENTLY ASKED QUESTIONS AFTER SURGERY  Jhonny Bauer, CLAUDETTE Hall, ORLANDO Mata, SCAR Lindquist, CLAUDETTE Jenkins, GEGE Guillaume  &  CHANDAN Gunderson      Q:  How should my incision look?    A:  Normally your incision will appear slightly swollen with light redness directly along the incision itself as it heals.  It may feel like a bump or ridge as the healing/scarring happens, and over time (3-4 months) this bump or ridge feeling should slowly go away.  In general, clear or pink watery drainage can be normal at first as your incision heals, but should decrease over time.    Q:  How do I know if my incision is infected?  A:  Look at your incision for signs of infection, like redness around the incision spreading to surrounding skin, or drainage of cloudy or foul-smelling drainage.  If you feel warm, check your temperature to see if you are running a fever.    **If any of these things occur, please notify the nurse at our office.  We may need you to come into the office for an incision check.      Q:  How do I take care of my incision?  A:  If you have a dressing in place - Starting the day after surgery, replace the dressing 1-2 times a day until there is no further drainage from the incision.  At that time, a dressing is no longer needed.  Try to  minimize tape on the skin if irritation is occurring at the tape sites.  If you have significant irritation from tape on the skin, please call the office to discuss other method of dressing your incision.    Small pieces of tape called  steri-strips  may be present directly overlying your incision; these may be removed 10 days after surgery unless otherwise specified by your surgeon.  If these tapes start to loosen at the ends, you may trim them back until they fall off or are removed.    A:  If you had  Dermabond  tissue glue used as a dressing (this causes your incision to look shiny with a clear covering over it) - This type of dressing wears off with time and does not require more dressings over the top unless it is draining around the glue as it wears off.  Do not apply ointments or lotions over the incisions until the glue has completely worn off.    Q:  There is a piece of tape or a sticky  lead  still on my skin.  Can I remove this?  A:  Sometimes the sticky  leads  used for monitoring during surgery or for evaluation in the emergency department are not all removed while you are in the hospital.  These sometimes have a tab or metal dot on them.  You can easily remove these on your own, like taking off a band-aid.  If there is a gel substance under the  lead , simply wipe/clean it off with a washcloth or paper towel.      Q:  What can I do to minimize constipation (very hard stools, or lack of stools)?  A:  Stay well hydrated.  Increase your dietary fiber intake or take a fiber supplement -with plenty of water.  Walk around frequently.  You may consider an over-the-counter stool-softener.  Your Pharmacist can assist you with choosing one that is stocked at your pharmacy.  Constipation is also one of the most common side effects of pain medication.  If you are using pain medication, be pro-active and try to PREVENT problems with constipation by taking the steps above BEFORE constipation becomes a  problem.    Q:  What do I do if I need more pain medications?  A:  Call the office to receive refills.  Be aware that certain pain meds cannot be called into a pharmacy and actually require a paper prescription.  A change may be made in your pain med as you progress thru your recovery period or if you have side effects to certain meds.    --Pain meds are NOT refilled after 5pm on weekdays, and NOT AT ALL on the weekends, so please look ahead to prevent problems.    Q:  Why am I having a hard time sleeping now that I am at home?  A:  Many medications you receive while you are in the hospital can impact your sleep for a number of days after your surgery/hospitalization.  Decreased level of activity and naps during the day may also make sleeping at night difficult.  Try to minimize day-time naps, and get up frequently during the day to walk around your home during your recovery time.  Sleep aides may be of some help, but are not recommended for long-term use.      Q:  I am having some back discomfort.  What should I do?  A:  This may be related to certain positioning that was required for your surgery, extended periods of time in bed, or other changes in your overall activity level.  You may try ice, heat, acetaminophen, or ibuprofen to treat this temporarily.  Note that many pain medications have acetaminophen in them and would state this on the prescription bottle.  Be sure not to exceed the maximum of 4000mg per day of acetaminophen.     **If the pain you are having does not resolve, is severe, or is a flare of back pain you have had on other occasions prior to surgery, please contact your primary physician for further recommendations or for an appointment to be examined at their office.    Q:  Why am I having headaches?  A:  Headaches can be caused by many things:  caffeine withdrawal, use of pain meds, dehydration, high blood pressure, lack of sleep, over-activity/exhaustion, flare-up of usual migraine headaches.   If you feel this is related to muscle tension (a band-like feeling around the head, or a pressure at the low-back of the head) you may try ice or heat to this area.  You may need to drink more fluids (try electrolyte drink like Gatorade), rest, or take your usual migraine medications.   **If your headaches do not resolve, worsen, are accompanied by other symptoms, or if your blood pressure is high, please call your primary physician for recommendation and/or examination.    Q:  I am unable to urinate.  What do I do?  A:  A small percentage of people can have difficulty urinating initially after surgery.  This includes being able to urinate only a very small amount at a time and feeling discomfort or pressure in the very low abdomen.  This is called  urinary retention , and is actually an urgent situation.  Proceed to your nearest Emergency department for evaluation (not an Urgent Care Center).  Sometimes the bladder does not work correctly after certain medications you receive during surgery, or related to certain procedures.  You may need to have a catheter placed until your bladder recovers.  When planning to go to an Emergency department, it may help to call the ER to let them know you are coming in for this problem after a surgery.  This may help you get in quicker to be evaluated.  **If you have symptoms of a urinary tract infection, please contact your primary physician for the proper evaluation and treatment.    If you have other questions, please call the office Monday thru Friday between 8am and 5pm to discuss with the nurse or physician assistant.  #[phone]    There is a surgeon ON CALL on weekday evenings and over the weekend in case of urgent need only, and may be contacted at the same number.    If you are having an emergency, call 911 or proceed to your nearest emergency department.        GENERAL ANESTHESIA OR SEDATION ADULT DISCHARGE INSTRUCTIONS   SPECIAL PRECAUTIONS FOR 24 HOURS AFTER  SURGERY    IT IS NOT UNUSUAL TO FEEL LIGHT-HEADED OR FAINT, UP TO 24 HOURS AFTER SURGERY OR WHILE TAKING PAIN MEDICATION.  IF YOU HAVE THESE SYMPTOMS; SIT FOR A FEW MINUTES BEFORE STANDING AND HAVE SOMEONE ASSIST YOU WHEN YOU GET UP TO WALK OR USE THE BATHROOM.    YOU SHOULD REST AND RELAX FOR THE NEXT 24 HOURS AND YOU MUST MAKE ARRANGEMENTS TO HAVE SOMEONE STAY WITH YOU FOR AT LEAST 24 HOURS AFTER YOUR DISCHARGE.  AVOID HAZARDOUS AND STRENUOUS ACTIVITIES.  DO NOT MAKE IMPORTANT DECISIONS FOR 24 HOURS.    DO NOT DRIVE ANY VEHICLE OR OPERATE MECHANICAL EQUIPMENT FOR 24 HOURS FOLLOWING THE END OF YOUR SURGERY.  EVEN THOUGH YOU MAY FEEL NORMAL, YOUR REACTIONS MAY BE AFFECTED BY THE MEDICATION YOU HAVE RECEIVED.    DO NOT DRINK ALCOHOLIC BEVERAGES FOR 24 HOURS FOLLOWING YOUR SURGERY.    DRINK CLEAR LIQUIDS (APPLE JUICE, GINGER ALE, 7-UP, BROTH, ETC.).  PROGRESS TO YOUR REGULAR DIET AS YOU FEEL ABLE.    YOU MAY HAVE A DRY MOUTH, A SORE THROAT, MUSCLES ACHES OR TROUBLE SLEEPING.  THESE SHOULD GO AWAY AFTER 24 HOURS.    CALL YOUR DOCTOR FOR ANY OF THE FOLLOWING:  SIGNS OF INFECTION (FEVER, GROWING TENDERNESS AT THE SURGERY SITE, A LARGE AMOUNT OF DRAINAGE OR BLEEDING, SEVERE PAIN, FOUL-SMELLING DRAINAGE, REDNESS OR SWELLING.    IT HAS BEEN OVER 8 TO 10 HOURS SINCE SURGERY AND YOU ARE STILL NOT ABLE TO URINATE (PASS WATER).     Maximum acetaminophen (Tylenol) dose from all sources should not exceed 4 grams (4000 mg) per day. You have had 1000 mg today at 7:32 am  You received Toradol, an IV form of ibuprofen (Motrin) at 1137 am.  Do not take any ibuprofen products until 5:37pm.  You took one oxycodone pain pill at 1:10 pm.

## 2017-08-15 NOTE — IP AVS SNAPSHOT
MRN:[id number]                      After Visit Summary   8/15/2017    Sha Vidal    MRN: [id number]           Thank you!     Thank you for choosing [facility] for your care. Our goal is always to provide you with excellent care. Hearing back from our patients is one way we can continue to improve our services. Please take a few minutes to complete the written survey that you may receive in the mail after you visit. If you would like to speak to someone directly about your visit please contact Patient Relations at [phone]. Thank you!          Patient Information     Date Of Birth          1970        About your hospital stay     You were admitted on:  August 15, 2017 You last received care in the:  [facility] PreOP/PostOP    You were discharged on:  August 15, 2017       Who to Call     For medical emergencies, please call 911.  For non-urgent questions about your medical care, please call your primary care provider or clinic, [phone]  For questions related to your surgery, please call your surgery clinic        Attending Provider     Provider Specialty    Damon Hall MD General Surgery       Primary Care Provider Office Phone # Fax #    Anita Quiros PA-C [phone]      Your next 10 appointments already scheduled     Aug 18, 2017 11:45 AM CDT   Office Visit with Neptali Fournier MD   [facility] ([facility]    [address] [address] [phone]           Bring a current list of meds and any records pertaining to this visit. For Physicals, please bring immunization records and any forms needing to be filled out. Please arrive 10 minutes early to complete paperwork.              Further instructions from your care team       HOME CARE FOLLOWING HERNIA REPAIR  CLAUDETTE Zavaleta E. Gavin, CLAUDETTE Fernandez, REdmundo Guillaume &  Joni    DIET:  No restrictions. Increased fluid intake is recommended. While taking pain medications, increase dietary fiber or add a fiber supplementation like Metamucil or Citrucel to help prevent constipation - a possible side effect of pain medications.  If taking Metamucil or Citrucel, take with plenty of fluids as instructed.    NAUSEA:  If nauseated from the anesthetic/pain meds; rest in bed, get up cautiously with assistance, and drink clear liquids (juice, tea, broth).    ACTIVITY:  Light Activity -- you may immediately be up and about as tolerated.  Driving -- you may drive when comfortable and off narcotic pain medications.  Light Work -- resume when comfortable off pain medications.  (If you can drive, you probably can work.)  Strenuous Work/Activity -- limit lifting to 20 pounds for 4 weeks.  Active Sports (running, biking, etc.) -- cautiously resume after 3 weeks.    INCISIONAL CARE:    If you have a dressing in place, keep clean and dry for 48 hours; you may replace the gauze if it becomes soiled.    After 48 hours you may remove the dressing and shower.  Do not submerse incision in water for 1 week.    If you have a Dermabond dressing (a type of skin glue), you may shower immediately.    Sutures will absorb and need not be removed.    If present, leave the steri-strips (white paper tapes) in place until they fall off.    If present, leave Dermabond glue in place until it wears/flakes off.    Expect a variable amount of swelling/black and blue discoloration that may involve the penis/scrotum or labia.    Some numbness around the incision is common.    A lump/ridge under the incision is normal and will gradually resolve.    DISCOMFORT:  Local anesthetic placed at surgery should provide relief for 4-8 hours.  Begin taking pain pills before discomfort is severe.  Take the pain medication with some food, when possible, to minimize side effects.  Intermittent use of ice packs to the hernia repair site  may help during the first 48 hours.  Expect gradual improvement.    RETURN APPOINTMENT:  Schedule a follow-up visit 1-3 weeks post-op (you may do this any time after surgery is scheduled).  Office Phone:  [phone]    CONTACT US IF THE FOLLOWING DEVELOPS:  1.  A fever that is above 101    2.  If there is a large amount of drainage, bleeding, or swelling.  3.  Severe pain that is not relieved by your prescription.  4.  Drainage that is thick, cloudy, yellow, green or white.  5.  Any other questions not answered by  Frequently Asked Questions  sheet.        FREQUENTLY ASKED QUESTIONS AFTER SURGERY  Jhonny Bauer, CLAUDETTE Hall, ORLANDO Mata, SCAR Lindquist, CLAUDETTE Jenkins, GEGE Guillaume  &  CHANDAN Gunderson      Q:  How should my incision look?    A:  Normally your incision will appear slightly swollen with light redness directly along the incision itself as it heals.  It may feel like a bump or ridge as the healing/scarring happens, and over time (3-4 months) this bump or ridge feeling should slowly go away.  In general, clear or pink watery drainage can be normal at first as your incision heals, but should decrease over time.    Q:  How do I know if my incision is infected?  A:  Look at your incision for signs of infection, like redness around the incision spreading to surrounding skin, or drainage of cloudy or foul-smelling drainage.  If you feel warm, check your temperature to see if you are running a fever.    **If any of these things occur, please notify the nurse at our office.  We may need you to come into the office for an incision check.      Q:  How do I take care of my incision?  A:  If you have a dressing in place - Starting the day after surgery, replace the dressing 1-2 times a day until there is no further drainage from the incision.  At that time, a dressing is no longer needed.  Try to minimize tape on the skin if irritation is occurring at the tape sites.  If you have significant irritation from tape on the skin,  please call the office to discuss other method of dressing your incision.    Small pieces of tape called  steri-strips  may be present directly overlying your incision; these may be removed 10 days after surgery unless otherwise specified by your surgeon.  If these tapes start to loosen at the ends, you may trim them back until they fall off or are removed.    A:  If you had  Dermabond  tissue glue used as a dressing (this causes your incision to look shiny with a clear covering over it) - This type of dressing wears off with time and does not require more dressings over the top unless it is draining around the glue as it wears off.  Do not apply ointments or lotions over the incisions until the glue has completely worn off.    Q:  There is a piece of tape or a sticky  lead  still on my skin.  Can I remove this?  A:  Sometimes the sticky  leads  used for monitoring during surgery or for evaluation in the emergency department are not all removed while you are in the hospital.  These sometimes have a tab or metal dot on them.  You can easily remove these on your own, like taking off a band-aid.  If there is a gel substance under the  lead , simply wipe/clean it off with a washcloth or paper towel.      Q:  What can I do to minimize constipation (very hard stools, or lack of stools)?  A:  Stay well hydrated.  Increase your dietary fiber intake or take a fiber supplement -with plenty of water.  Walk around frequently.  You may consider an over-the-counter stool-softener.  Your Pharmacist can assist you with choosing one that is stocked at your pharmacy.  Constipation is also one of the most common side effects of pain medication.  If you are using pain medication, be pro-active and try to PREVENT problems with constipation by taking the steps above BEFORE constipation becomes a problem.    Q:  What do I do if I need more pain medications?  A:  Call the office to receive refills.  Be aware that certain pain meds cannot  be called into a pharmacy and actually require a paper prescription.  A change may be made in your pain med as you progress thru your recovery period or if you have side effects to certain meds.    --Pain meds are NOT refilled after 5pm on weekdays, and NOT AT ALL on the weekends, so please look ahead to prevent problems.    Q:  Why am I having a hard time sleeping now that I am at home?  A:  Many medications you receive while you are in the hospital can impact your sleep for a number of days after your surgery/hospitalization.  Decreased level of activity and naps during the day may also make sleeping at night difficult.  Try to minimize day-time naps, and get up frequently during the day to walk around your home during your recovery time.  Sleep aides may be of some help, but are not recommended for long-term use.      Q:  I am having some back discomfort.  What should I do?  A:  This may be related to certain positioning that was required for your surgery, extended periods of time in bed, or other changes in your overall activity level.  You may try ice, heat, acetaminophen, or ibuprofen to treat this temporarily.  Note that many pain medications have acetaminophen in them and would state this on the prescription bottle.  Be sure not to exceed the maximum of 4000mg per day of acetaminophen.     **If the pain you are having does not resolve, is severe, or is a flare of back pain you have had on other occasions prior to surgery, please contact your primary physician for further recommendations or for an appointment to be examined at their office.    Q:  Why am I having headaches?  A:  Headaches can be caused by many things:  caffeine withdrawal, use of pain meds, dehydration, high blood pressure, lack of sleep, over-activity/exhaustion, flare-up of usual migraine headaches.  If you feel this is related to muscle tension (a band-like feeling around the head, or a pressure at the low-back of the head) you may try ice  or heat to this area.  You may need to drink more fluids (try electrolyte drink like Gatorade), rest, or take your usual migraine medications.   **If your headaches do not resolve, worsen, are accompanied by other symptoms, or if your blood pressure is high, please call your primary physician for recommendation and/or examination.    Q:  I am unable to urinate.  What do I do?  A:  A small percentage of people can have difficulty urinating initially after surgery.  This includes being able to urinate only a very small amount at a time and feeling discomfort or pressure in the very low abdomen.  This is called  urinary retention , and is actually an urgent situation.  Proceed to your nearest Emergency department for evaluation (not an Urgent Care Center).  Sometimes the bladder does not work correctly after certain medications you receive during surgery, or related to certain procedures.  You may need to have a catheter placed until your bladder recovers.  When planning to go to an Emergency department, it may help to call the ER to let them know you are coming in for this problem after a surgery.  This may help you get in quicker to be evaluated.  **If you have symptoms of a urinary tract infection, please contact your primary physician for the proper evaluation and treatment.    If you have other questions, please call the office Monday thru Friday between 8am and 5pm to discuss with the nurse or physician assistant.  #[phone]    There is a surgeon ON CALL on weekday evenings and over the weekend in case of urgent need only, and may be contacted at the same number.    If you are having an emergency, call 911 or proceed to your nearest emergency department.        GENERAL ANESTHESIA OR SEDATION ADULT DISCHARGE INSTRUCTIONS   SPECIAL PRECAUTIONS FOR 24 HOURS AFTER SURGERY    IT IS NOT UNUSUAL TO FEEL LIGHT-HEADED OR FAINT, UP TO 24 HOURS AFTER SURGERY OR WHILE TAKING PAIN MEDICATION.  IF YOU HAVE THESE  "SYMPTOMS; SIT FOR A FEW MINUTES BEFORE STANDING AND HAVE SOMEONE ASSIST YOU WHEN YOU GET UP TO WALK OR USE THE BATHROOM.    YOU SHOULD REST AND RELAX FOR THE NEXT 24 HOURS AND YOU MUST MAKE ARRANGEMENTS TO HAVE SOMEONE STAY WITH YOU FOR AT LEAST 24 HOURS AFTER YOUR DISCHARGE.  AVOID HAZARDOUS AND STRENUOUS ACTIVITIES.  DO NOT MAKE IMPORTANT DECISIONS FOR 24 HOURS.    DO NOT DRIVE ANY VEHICLE OR OPERATE MECHANICAL EQUIPMENT FOR 24 HOURS FOLLOWING THE END OF YOUR SURGERY.  EVEN THOUGH YOU MAY FEEL NORMAL, YOUR REACTIONS MAY BE AFFECTED BY THE MEDICATION YOU HAVE RECEIVED.    DO NOT DRINK ALCOHOLIC BEVERAGES FOR 24 HOURS FOLLOWING YOUR SURGERY.    DRINK CLEAR LIQUIDS (APPLE JUICE, GINGER ALE, 7-UP, BROTH, ETC.).  PROGRESS TO YOUR REGULAR DIET AS YOU FEEL ABLE.    YOU MAY HAVE A DRY MOUTH, A SORE THROAT, MUSCLES ACHES OR TROUBLE SLEEPING.  THESE SHOULD GO AWAY AFTER 24 HOURS.    CALL YOUR DOCTOR FOR ANY OF THE FOLLOWING:  SIGNS OF INFECTION (FEVER, GROWING TENDERNESS AT THE SURGERY SITE, A LARGE AMOUNT OF DRAINAGE OR BLEEDING, SEVERE PAIN, FOUL-SMELLING DRAINAGE, REDNESS OR SWELLING.    IT HAS BEEN OVER 8 TO 10 HOURS SINCE SURGERY AND YOU ARE STILL NOT ABLE TO URINATE (PASS WATER).     Maximum acetaminophen (Tylenol) dose from all sources should not exceed 4 grams (4000 mg) per day. You have had 1000 mg today at 7:32 am  You received Toradol, an IV form of ibuprofen (Motrin) at 1137 am.  Do not take any ibuprofen products until 5:37pm.  You took one oxycodone pain pill at 1:10 pm.        Pending Results     No orders found from 8/13/2017 to 8/16/2017.            Admission Information     Date & Time Provider Department Dept. Phone    8/15/2017 Damon Hall MD [facility] PreOP/PostOP [phone]      Your Vitals Were     Blood Pressure Pulse Temperature Respirations Height Weight    115/74 (BP Location: Right arm) 73 97.1  F (36.2  C) 10 1.575 m (5' 2\") 73 kg (161 lb)    Last Period Pulse Oximetry BMI (Body " Mass Index)             08/05/2017 92% 29.45 kg/m2         "[employer] Information     "[employer] gives you secure access to your electronic health record. If you see a primary care provider, you can also send messages to your care team and make appointments. If you have questions, please call your primary care clinic.  If you do not have a primary care provider, please call [phone] and they will assist you.        Care EveryWhere ID     This is your Care EveryWhere ID. This could be used by other organizations to access your [place] medical records  [id number]        Equal Access to Services     [facility]: Hadhe Dow, washani euceda, veronica pablo, morales bland . So [facility] [phone].    ATENCIÓN: Si habla español, tiene a collins disposición servicios gratuitos de asistencia lingüística. Carlos al [phone].    We comply with applicable federal civil rights laws and [place] laws. We do not discriminate on the basis of race, color, national origin, age, disability sex, sexual orientation or gender identity.               Review of your medicines      START taking        Dose / Directions    oxyCODONE 5 MG IR tablet   Commonly known as:  ROXICODONE   Used for:  Incisional hernia, without obstruction or gangrene        Dose:  5-10 mg   Take 1-2 tablets (5-10 mg) by mouth every 3 hours as needed for pain or other (Moderate to Severe)   Quantity:  30 tablet   Refills:  0         CONTINUE these medicines which have NOT CHANGED        Dose / Directions    cetirizine 10 MG tablet   Commonly known as:  zyrTEC        Dose:  10 mg   Take 10 mg by mouth daily   Refills:  0       D 2000 2000 UNITS tablet   Generic drug:  cholecalciferol        Dose:  2000 Units   Take 2,000 Units by mouth   Refills:  0       FLUoxetine 10 MG capsule   Commonly known as:  PROzac   Used for:  Adjustment disorder with mixed anxiety and depressed mood        TAKE 1 CAPSULE BY MOUTH   DAILY DUE FOR AN ANNUAL  VISIT IN JULY. PLEASE CALL  CLINIC TO SCHEDULE.   Quantity:  90 capsule   Refills:  3       HYDROcodone-acetaminophen 5-325 MG per tablet   Commonly known as:  NORCO   Used for:  Preop general physical exam, Incisional hernia, without obstruction or gangrene        Dose:  1-2 tablet   Take 1-2 tablets by mouth every 6 hours as needed for moderate to severe pain   Quantity:  30 tablet   Refills:  0       levothyroxine 25 MCG tablet   Commonly known as:  SYNTHROID/LEVOTHROID   Used for:  Hypothyroidism, unspecified type        Dose:  25 mcg   Take 1 tablet (25 mcg) by mouth daily   Quantity:  90 tablet   Refills:  3       MULTI-VITAMINS Tabs        Dose:  1 tablet   Take 1 tablet by mouth   Refills:  0       phentermine 37.5 MG tablet   Commonly known as:  ADIPEX-P   Used for:  Hypothyroidism due to Hashimoto's thyroiditis, CARDIOVASCULAR SCREENING; LDL GOAL LESS THAN 160, Family history of diabetes mellitus        Dose:  37.5 mg   Take 1 tablet (37.5 mg) by mouth every morning (before breakfast)   Quantity:  31 tablet   Refills:  2       SUMAtriptan 100 MG tablet   Commonly known as:  IMITREX   Used for:  Migraine without aura and without status migrainosus, not intractable        Dose:  100 mg   Take 1 tablet (100 mg) by mouth at onset of headache for migraine May repeat in 2 hours if needed: max 2/day; average number of headaches  2 yearly   Quantity:  9 tablet   Refills:  1            Where to get your medicines      Some of these will need a paper prescription and others can be bought over the counter. Ask your nurse if you have questions.     Bring a paper prescription for each of these medications     oxyCODONE 5 MG IR tablet                Protect others around you: Learn how to safely use, store and throw away your medicines at www.disposemymeds.org.             Medication List: This is a list of all your medications and when to take them. Check marks below indicate your daily home  schedule. Keep this list as a reference.      Medications           Morning Afternoon Evening Bedtime As Needed    cetirizine 10 MG tablet   Commonly known as:  zyrTEC   Take 10 mg by mouth daily                                D 2000 2000 UNITS tablet   Take 2,000 Units by mouth   Generic drug:  cholecalciferol                                FLUoxetine 10 MG capsule   Commonly known as:  PROzac   TAKE 1 CAPSULE BY MOUTH  DAILY DUE FOR AN ANNUAL  VISIT IN JULY. PLEASE CALL  CLINIC TO SCHEDULE.                                HYDROcodone-acetaminophen 5-325 MG per tablet   Commonly known as:  NORCO   Take 1-2 tablets by mouth every 6 hours as needed for moderate to severe pain                                levothyroxine 25 MCG tablet   Commonly known as:  SYNTHROID/LEVOTHROID   Take 1 tablet (25 mcg) by mouth daily                                MULTI-VITAMINS Tabs   Take 1 tablet by mouth                                oxyCODONE 5 MG IR tablet   Commonly known as:  ROXICODONE   Take 1-2 tablets (5-10 mg) by mouth every 3 hours as needed for pain or other (Moderate to Severe)   Last time this was given:  5 mg on 8/15/2017  1:08 PM                                phentermine 37.5 MG tablet   Commonly known as:  ADIPEX-P   Take 1 tablet (37.5 mg) by mouth every morning (before breakfast)                                SUMAtriptan 100 MG tablet   Commonly known as:  IMITREX   Take 1 tablet (100 mg) by mouth at onset of headache for migraine May repeat in 2 hours if needed: max 2/day; average number of headaches  2 yearly

## 2017-08-15 NOTE — IP AVS SNAPSHOT
[facility] PreOP/PostOP    [address]    [facility] [address]    Phone:  [phone]    Fax:  [phone]                                       After Visit Summary   8/15/2017    Sha Vidal    MRN: [id number]           After Visit Summary Signature Page     I have received my discharge instructions, and my questions have been answered. I have discussed any challenges I see with this plan with the nurse or doctor.    ..........................................................................................................................................  Patient/Patient Representative Signature      ..........................................................................................................................................  Patient Representative Print Name and Relationship to Patient    ..................................................               ................................................  Date                                            Time    ..........................................................................................................................................  Reviewed by Signature/Title    ...................................................              ..............................................  Date                                                            Time

## 2017-08-15 NOTE — ANESTHESIA CARE TRANSFER NOTE
Patient: Sha Vidal    Procedure(s):  Robotic assisted incisional and supra-umbilical  hernia repair with mesh - Wound Class: II-Clean Contaminated    Diagnosis: incisional hernia with mesh  Diagnosis Additional Information: No value filed.    Anesthesia Type:   General, ETT     Note:  Airway :Face Mask  Patient transferred to:PACU  Comments: VSS.  Spontaneously breathing O2 per open face mask.  Report given to RN.      Vitals: (Last set prior to Anesthesia Care Transfer)    CRNA VITALS  8/15/2017 1120 - 8/15/2017 1200      8/15/2017             NIBP: (!)  150/124    NIBP Mean: 139                Electronically Signed By: DULCE Mckinley CRNA  August 15, 2017  12:00 PM

## 2017-08-15 NOTE — ANESTHESIA PREPROCEDURE EVALUATION
Anesthesia Evaluation     . Pt has had prior anesthetic. Type: General    No history of anesthetic complications          ROS/MED HX    ENT/Pulmonary:  - neg pulmonary ROS     Neurologic:     (+)migraines,     Cardiovascular:  - neg cardiovascular ROS       METS/Exercise Tolerance:     Hematologic:  - neg hematologic  ROS       Musculoskeletal:  - neg musculoskeletal ROS       GI/Hepatic:     (+) Other GI/Hepatic hernias      Renal/Genitourinary:  - ROS Renal section negative       Endo:     (+) thyroid problem hypothyroidism, .      Psychiatric:  - neg psychiatric ROS       Infectious Disease:  - neg infectious disease ROS       Malignancy:      - no malignancy   Other:    - neg other ROS                 Physical Exam  Normal systems: cardiovascular, pulmonary and dental    Airway   Mallampati: I  TM distance: >3 FB  Neck ROM: full    Dental     Cardiovascular       Pulmonary                     Anesthesia Plan      History & Physical Review  History and physical reviewed and following examination; no interval change.    ASA Status:  2 .    NPO Status:  > 8 hours    Plan for General and ETT with Intravenous and Propofol induction. Maintenance will be Balanced.    PONV prophylaxis:  Ondansetron (or other 5HT-3) and Dexamethasone or Solumedrol       Postoperative Care  Postoperative pain management:  IV analgesics and Oral pain medications.      Consents  Anesthetic plan, risks, benefits and alternatives discussed with:  Patient or representative and Patient..                          .

## 2017-08-21 DIAGNOSIS — G89.18 ACUTE POST-OPERATIVE PAIN: Primary | ICD-10-CM

## 2017-08-21 DIAGNOSIS — K43.2 INCISIONAL HERNIA, WITHOUT OBSTRUCTION OR GANGRENE: ICD-10-CM

## 2017-08-21 RX ORDER — OXYCODONE HYDROCHLORIDE 5 MG/1
5-10 TABLET ORAL
Qty: 30 TABLET | Refills: 0 | Status: SHIPPED | OUTPATIENT
Start: 2017-08-21 | End: 2017-09-21

## 2017-08-21 NOTE — TELEPHONE ENCOUNTER
REFILL REQUEST GENERAL SURGERY    Refill request: first    Sha Vidal      MRN# [id number]  AGE:  47 year old     YOB: 1970  [phone] (home) NONE (work)     Surgeon: Dr. Hall  Surgical assist: Dave Katz PA-C      Surgery type: Robotic assisted incisional and supra-umbilical  hernia repair with mesh      Surgery Date: August / 15 / 2017      POD: 6     Sha Vidal is a 47 year old female who called requesting a refill for pain medication.  Patient medication request: Oxycodone  Type/Amount of pain medication in current use: Oxycodone   Are you prescribed any other pain medication by any other provider?  no   Patient Symptoms: Pain with a severity of: 6 on a scale of 1-10.  With movement.  Fever:  no  Incision: Patient reports  Healing well and without signs of infection  Last bowel movement: Today    Patient is currently taking Advil twice a day.    PLAN:     Refill request forwarded to clinic [facility] for approval.  Additionally to assist with pain control patient will     Cool packs    Continue Advil  Patient is aware we will call after prescription has been approved by provider.  Clinic is located at [address] [place].  Hours are 8-5pm.  Patient is also aware that our clinic  Policy is one refill by phone triage.  After that, all patients must be physically assessed.    Sha Vidal  is recommended to contact the clinic if worsening pain, onset of fever/redness at any incision site, or new drainage from the area. Pt also recommended to call office at any time if ongoing questions/concerns during recovery. Pt is in agreement with this plan.

## 2017-09-01 ENCOUNTER — OFFICE VISIT (OUTPATIENT)
Dept: SURGERY | Facility: CLINIC | Age: 47
End: 2017-09-01
Payer: COMMERCIAL

## 2017-09-01 VITALS — BODY MASS INDEX: 29.63 KG/M2 | HEIGHT: 62 IN | WEIGHT: 161 LBS

## 2017-09-01 DIAGNOSIS — Z09 SURGERY FOLLOW-UP: Primary | ICD-10-CM

## 2017-09-01 PROCEDURE — 99024 POSTOP FOLLOW-UP VISIT: CPT | Performed by: SURGERY

## 2017-09-01 NOTE — MR AVS SNAPSHOT
After Visit Summary   9/1/2017    Sha Vidal    MRN: [id number]           Patient Information     Date Of Birth          1970        Visit Information        Provider Department      9/1/2017 10:00 AM Damon Hall MD [facility]      Today's Diagnoses     Surgery follow-up    -  1       Follow-ups after your visit        Your next 10 appointments already scheduled     Sep 14, 2017  1:30 PM CDT   SHORT with Neptali Fournier MD   [facility] ([facility])    [address] [place] [id number] [phone]              Who to contact     If you have questions or need follow up information about today's clinic visit or your schedule please contact [facility] directly at [phone].  Normal or non-critical lab and imaging results will be communicated to you by MyChart, letter or phone within 4 business days after the clinic has received the results. If you do not hear from us within 7 days, please contact the clinic through MyChart or phone. If you have a critical or abnormal lab result, we will notify you by phone as soon as possible.  Submit refill requests through [employer] or call your pharmacy and they will forward the refill request to us. Please allow 3 business days for your refill to be completed.          Additional Information About Your Visit        MyChart Information     [employer] gives you secure access to your electronic health record. If you see a primary care provider, you can also send messages to your care team and make appointments. If you have questions, please call your primary care clinic.  If you do not have a primary care provider, please call [phone] and they will assist you.        Care EveryWhere ID     This is your Care EveryWhere ID. This could be used by other organizations to access your [facility]  "records  [id number]        Your Vitals Were     Height Last Period BMI (Body Mass Index)             5' 2\" (1.575 m) 08/05/2017 29.45 kg/m2          Blood Pressure from Last 3 Encounters:   08/15/17 112/69   08/03/17 108/72   08/01/17 124/62    Weight from Last 3 Encounters:   09/01/17 161 lb (73 kg)   08/15/17 161 lb (73 kg)   08/03/17 162 lb (73.5 kg)              Today, you had the following     No orders found for display       Primary Care Provider Office Phone # Fax #    Anita Duransaturnino Quiros PA-C [phone] [address] [address] [facility] [address]        Equal Access to Services     ROBSON CONLEY : Hadii melodie de jesuso Soluci, waaxda luqadaha, qaybta kaalmada adeegyada, morales bland . So [facility] [phone].    ATENCIÓN: Si habla español, tiene a collins disposición servicios gratuitos de asistencia lingüística. Llame al [phone].    We comply with applicable federal civil rights laws and [place] laws. We do not discriminate on the basis of race, color, national origin, age, disability sex, sexual orientation or gender identity.            Thank you!     Thank you for choosing [facility]  for your care. Our goal is always to provide you with excellent care. Hearing back from our patients is one way we can continue to improve our services. Please take a few minutes to complete the written survey that you may receive in the mail after your visit with us. Thank you!             Your Updated Medication List - Protect others around you: Learn how to safely use, store and throw away your medicines at www.disposemymeds.org.          This list is accurate as of: 9/1/17 11:48 AM.  Always use your most recent med list.                   Brand Name Dispense Instructions for use Diagnosis    cetirizine 10 MG tablet    zyrTEC     Take 10 mg by mouth daily        D 2000 2000 UNITS tablet   Generic drug:  cholecalciferol      Take 2,000 Units by mouth "        FLUoxetine 10 MG capsule    PROzac    90 capsule    TAKE 1 CAPSULE BY MOUTH  DAILY DUE FOR AN ANNUAL  VISIT IN JULY. PLEASE CALL  CLINIC TO SCHEDULE.    Adjustment disorder with mixed anxiety and depressed mood       HYDROcodone-acetaminophen 5-325 MG per tablet    NORCO    30 tablet    Take 1-2 tablets by mouth every 6 hours as needed for moderate to severe pain    Preop general physical exam, Incisional hernia, without obstruction or gangrene       levothyroxine 25 MCG tablet    SYNTHROID/LEVOTHROID    90 tablet    Take 1 tablet (25 mcg) by mouth daily    Hypothyroidism, unspecified type       MULTI-VITAMINS Tabs      Take 1 tablet by mouth        oxyCODONE 5 MG IR tablet    ROXICODONE    30 tablet    Take 1-2 tablets (5-10 mg) by mouth every 3 hours as needed for pain or other (Moderate to Severe)    Incisional hernia, without obstruction or gangrene, Acute post-operative pain       phentermine 37.5 MG tablet    ADIPEX-P    31 tablet    Take 1 tablet (37.5 mg) by mouth every morning (before breakfast)    Hypothyroidism due to Hashimoto's thyroiditis, CARDIOVASCULAR SCREENING; LDL GOAL LESS THAN 160, Family history of diabetes mellitus       SUMAtriptan 100 MG tablet    IMITREX    9 tablet    Take 1 tablet (100 mg) by mouth at onset of headache for migraine May repeat in 2 hours if needed: max 2/day; average number of headaches  2 yearly    Migraine without aura and without status migrainosus, not intractable

## 2017-09-01 NOTE — PROGRESS NOTES
The patient presents today to follow-up after her robotic-assisted incisional hernia repair with mesh.  She had multiple defects and we placed a 15 x 8 cm preperitoneal mesh.  She has continued to have some discomfort, though she is able to do most normal daily activities.  She is still taking intermittent pain pills, though she has not yet used all of her initial supply.    The patient's incisions are healing well.  There is a small eschar on the superior and inferior incisions and I have recommended keeping those covered until healed.    There is no obvious seroma at the site of the hernias.    Overall, the patient is doing well.  She should return to see me if she does not feel she is making continued progress.    Damon Hall MD  [facility]    Please route or send letter to:  Primary Care Provider (PCP)

## 2017-09-01 NOTE — LETTER
2017      RE:  Sha Vidal-:  [birth year]    The patient presents today to follow-up after her robotic-assisted incisional hernia repair with mesh. She had multiple defects and we placed a 15 x 8 cm preperitoneal mesh.  She has continued to have some discomfort, though she is able to do most normal daily activities.  She is still taking intermittent pain pills, though she has not yet used all of her initial supply.     The patient's incisions are healing well.  There is a small eschar on the superior and inferior incisions and I have recommended keeping those covered until healed.     There is no obvious seroma at the site of the hernias.     Overall, the patient is doing well.  She should return to see me if she does not feel she is making continued progress.     Damon Hall MD  [facility]

## 2017-09-14 ENCOUNTER — OFFICE VISIT (OUTPATIENT)
Dept: OBGYN | Facility: CLINIC | Age: 47
End: 2017-09-14
Payer: COMMERCIAL

## 2017-09-14 VITALS — SYSTOLIC BLOOD PRESSURE: 112 MMHG | DIASTOLIC BLOOD PRESSURE: 78 MMHG | WEIGHT: 167.2 LBS | BODY MASS INDEX: 30.58 KG/M2

## 2017-09-14 DIAGNOSIS — N93.9 ABNORMAL UTERINE BLEEDING: Primary | ICD-10-CM

## 2017-09-14 DIAGNOSIS — D25.9 UTERINE LEIOMYOMA, UNSPECIFIED LOCATION: ICD-10-CM

## 2017-09-14 PROCEDURE — 88305 TISSUE EXAM BY PATHOLOGIST: CPT | Performed by: OBSTETRICS & GYNECOLOGY

## 2017-09-14 PROCEDURE — 58100 BIOPSY OF UTERUS LINING: CPT | Performed by: OBSTETRICS & GYNECOLOGY

## 2017-09-14 PROCEDURE — 99214 OFFICE O/P EST MOD 30 MIN: CPT | Mod: 25 | Performed by: OBSTETRICS & GYNECOLOGY

## 2017-09-14 NOTE — PROGRESS NOTES
Sha Vidal is a 47 year old female   Patient's last menstrual period was 2017. Tubal ligation for contraception, who presents for evaluation of long standing menorrhagia and a recently noted uterine fibroid found on CT for an unrelated cause. Pt endorses heavy menstrual cycles. Pt says her heavy periods have been happening for the last 10 years.  She reported her periods had gotten better with removal of a fibroid 1.5 yrs ago but, they are getting worse again. Pt endorses the first day or two of period is heavy. She explains her periods keep her from leaving the house at times. 1 super  Pad and tampon would take a couple of hours to fill. Pt explains using a tampon and pad when her period is heavy, and tries to stay close to home. Pt endorses her cycles are regular, every 30 days. Denies blood transfusion sx of anemia or vaso vagal sx or iron therapy. Denies dizziness, fatigue, unscheduled bleeding  or post coital  bleeding. Denies hx of abnormal pap smear or sx to suggest an endocrinopathy. I reviewed the recent ultrasound Approximately 5 weeks ago which showed the following    Measurements:  Uterus: 9.2 x 4.9 x 6.2cm.     Position is anteverted.  Contour is irreg w myomata:   1) Mid 4.3 x 3.6 x 4.0cm.      Endo cav: 4 mm         Distorted  Cervix: Wnl, Nabothian Cyst noted       Right ovary: 2.6 x 1.2 x 1.6cm.   Wnl  Left ovary:   2.2 x 1.1 x 1.5 cm.  Complex cyst 1.3 x 1.1 x 1.3cm      Cul de sac: no free fluid      *Other findings:       ===================================  Complete pelvic ultrasound using realtime   transabdominal and transvaginal scanning        Multiple uterine leiomyomata which do affect the endometrium      Note: complex L ovarian cyst        -suggest short term follow up ultrasound or evaluation as clinically appropriate  I feel a complex cyst in the left ovary is a benign hemorrhagic cyst.\Risks, benefits, and alternative modes of therapy discussed at length.  Pathophysiology of the disease process reviewed, all of the patients questions answered and informed consent obtained.   After this discussion made a decision to proceed with an endometrial biopsy for evaluation  Past Medical History:   Diagnosis Date     Allergic rhinitis, cause unspecified      Bleeding disorder (H)     factor 12     CARDIOVASCULAR SCREENING; LDL GOAL LESS THAN 160 10/31/2010     Hashimoto's thyroiditis      Lateral epicondylitis 3/13/2009     Migraine headache 2/19/2013     NONSPECIFIC MEDICAL HISTORY 2002    Factor XII deficiency (asymptomatic)     NONSPECIFIC MEDICAL HISTORY 2002    GRAYSON positive 1:320, seen by Rheum (negative PHUONG eval)     Family History   Problem Relation Age of Onset     DIABETES Father      type 2     Coronary Artery Disease Mother      DIABETES Maternal Grandmother      Past Surgical History:   Procedure Laterality Date     ABDOMEN SURGERY      10/2007     APPENDECTOMY      10/2007     C NONSPECIFIC PROCEDURE      wisdom teeth extraction     C NONSPECIFIC PROCEDURE      Bilateral tubal ligation     C NONSPECIFIC PROCEDURE  1/01    Lasik     C NONSPECIFIC PROCEDURE      explo lap for ARMANDO for SBO     DAVINCI HERNIORRHAPHY VENTRAL N/A 8/15/2017    Procedure: DAVINCI HERNIORRHAPHY VENTRAL;  Robotic assisted incisional and supra-umbilical  hernia repair with mesh;  Surgeon: Damon Hall MD;  Location: [facility] OR      ROS: 10 point ROS neg other than the symptoms noted above in the HPI.  Constitutional: healthy, alert and no distress  Genitourinary: Normal external genitalia without lesions and speculum multipara appearing cervix no lesions male exam the uterus feels parous tubal adnexa without masses enlargement or tenderness at the vaginal exam is deferred  after obtaining informed consent pt prepped in the usual sterile fashion and endometrial biopsy preformed w a pipelle type sampler without difficulty or complication w a thorough sampling and acceptable specimen.   The uterus sounded to 8 cm.  the pt tolerated the procedure well and was D/C'd in good condition. Signs and Sx's of complications disc, pt to call.  pt will call in 1 week to rev path report and develope an approp plan.  (N92.0) Menorrhagia  (primary encounter diagnosis)  Comment: Recent hemoglobin results and lab findings reviewed. No anemia at this time. Endometrial biopsy done today  Plan: Surgical pathology exam        i'll see her back in one week to discuss results. At this point she is favoring hysteroscopy with hysteroscopic resection without endometrial ablation. A detailed written outline of our discussion was given              (D25.9) Uterine leiomyoma, unspecified location  Comment: As above  Plan: As above

## 2017-09-14 NOTE — MR AVS SNAPSHOT
After Visit Summary   9/14/2017    Sha Vidal    MRN: [id number]           Patient Information     Date Of Birth          1970        Visit Information        Provider Department      9/14/2017 1:30 PM Neptali Fournier MD [facility][1]        Today's Diagnoses     Abnormal uterine bleeding    -  1    Uterine leiomyoma, unspecified location           Follow-ups after your visit        Your next 10 appointments already scheduled     Sep 21, 2017  4:00 PM CDT   SHORT with Neptali Fournier MD   [facility][1] ([facility][1])    [address] [place] [id number] [phone]              Who to contact     If you have questions or need follow up information about today's clinic visit or your schedule please contact [facility] directly at [phone].  Normal or non-critical lab and imaging results will be communicated to you by MyChart, letter or phone within 4 business days after the clinic has received the results. If you do not hear from us within 7 days, please contact the clinic through [employer] or phone. If you have a critical or abnormal lab result, we will notify you by phone as soon as possible.  Submit refill requests through [employer] or call your pharmacy and they will forward the refill request to us. Please allow 3 business days for your refill to be completed.          Additional Information About Your Visit        MyChart Information     [employer] gives you secure access to your electronic health record. If you see a primary care provider, you can also send messages to your care team and make appointments. If you have questions, please call your primary care clinic.  If you do not have a primary care provider, please call [phone] and they will assist you.        Care EveryWhere ID     This is your Care EveryWhere ID. This could be used by other organizations to access your  [place] medical records  [id number]        Your Vitals Were     Last Period BMI (Body Mass Index)                08/05/2017 30.58 kg/m2           Blood Pressure from Last 3 Encounters:   09/14/17 112/78   08/15/17 112/69   08/03/17 108/72    Weight from Last 3 Encounters:   09/14/17 167 lb 3.2 oz (75.8 kg)   09/01/17 161 lb (73 kg)   08/15/17 161 lb (73 kg)              We Performed the Following     Surgical pathology exam        Primary Care Provider Office Phone # Fax #    Anita Quiros PA-C [phone]       [address] [facility] [facility] [address]        Equal Access to Services     ROBSON CONLEY : Hadii aad ku hadasho Soomaali, waaxda luqadaha, qaybta kaalmada adeegyada, waxay keeshain haygeovanna bland . So [place] [phone].    ATENCIÓN: Si habla español, tiene a collins disposición servicios gratuitos de asistencia lingüística. Llame al [phone].    We comply with applicable federal civil rights laws and [place] laws. We do not discriminate on the basis of race, color, national origin, age, disability sex, sexual orientation or gender identity.            Thank you!     Thank you for choosing [facility]  for your care. Our goal is always to provide you with excellent care. Hearing back from our patients is one way we can continue to improve our services. Please take a few minutes to complete the written survey that you may receive in the mail after your visit with us. Thank you!             Your Updated Medication List - Protect others around you: Learn how to safely use, store and throw away your medicines at www.disposemymeds.org.          This list is accurate as of: 9/14/17  2:08 PM.  Always use your most recent med list.                   Brand Name Dispense Instructions for use Diagnosis    cetirizine 10 MG tablet    zyrTEC     Take 10 mg by mouth daily        D 2000 2000 UNITS tablet   Generic drug:  cholecalciferol      Take 2,000 Units by  mouth        FLUoxetine 10 MG capsule    PROzac    90 capsule    TAKE 1 CAPSULE BY MOUTH  DAILY DUE FOR AN ANNUAL  VISIT IN JULY. PLEASE CALL  CLINIC TO SCHEDULE.    Adjustment disorder with mixed anxiety and depressed mood       HYDROcodone-acetaminophen 5-325 MG per tablet    NORCO    30 tablet    Take 1-2 tablets by mouth every 6 hours as needed for moderate to severe pain    Preop general physical exam, Incisional hernia, without obstruction or gangrene       levothyroxine 25 MCG tablet    SYNTHROID/LEVOTHROID    90 tablet    Take 1 tablet (25 mcg) by mouth daily    Hypothyroidism, unspecified type       MULTI-VITAMINS Tabs      Take 1 tablet by mouth        oxyCODONE 5 MG IR tablet    ROXICODONE    30 tablet    Take 1-2 tablets (5-10 mg) by mouth every 3 hours as needed for pain or other (Moderate to Severe)    Incisional hernia, without obstruction or gangrene, Acute post-operative pain       phentermine 37.5 MG tablet    ADIPEX-P    31 tablet    Take 1 tablet (37.5 mg) by mouth every morning (before breakfast)    Hypothyroidism due to Hashimoto's thyroiditis, CARDIOVASCULAR SCREENING; LDL GOAL LESS THAN 160, Family history of diabetes mellitus       SUMAtriptan 100 MG tablet    IMITREX    9 tablet    Take 1 tablet (100 mg) by mouth at onset of headache for migraine May repeat in 2 hours if needed: max 2/day; average number of headaches  2 yearly    Migraine without aura and without status migrainosus, not intractable

## 2017-09-14 NOTE — NURSING NOTE
"Chief Complaint   Patient presents with     Consult     US results   Martha Morgan MA      Initial /78 (BP Location: Right arm, Patient Position: Chair, Cuff Size: Adult Regular)  Wt 167 lb 3.2 oz (75.8 kg)  LMP 08/05/2017  BMI 30.58 kg/m2 Estimated body mass index is 30.58 kg/(m^2) as calculated from the following:    Height as of 9/1/17: 5' 2\" (1.575 m).    Weight as of this encounter: 167 lb 3.2 oz (75.8 kg).  Medication Reconciliation: complete    "

## 2017-09-18 LAB — COPATH REPORT: NORMAL

## 2017-09-21 ENCOUNTER — OFFICE VISIT (OUTPATIENT)
Dept: OBGYN | Facility: CLINIC | Age: 47
End: 2017-09-21
Payer: COMMERCIAL

## 2017-09-21 VITALS
BODY MASS INDEX: 30.36 KG/M2 | WEIGHT: 165 LBS | DIASTOLIC BLOOD PRESSURE: 68 MMHG | HEIGHT: 62 IN | HEART RATE: 72 BPM | SYSTOLIC BLOOD PRESSURE: 110 MMHG

## 2017-09-21 DIAGNOSIS — D25.9 UTERINE LEIOMYOMA, UNSPECIFIED LOCATION: ICD-10-CM

## 2017-09-21 DIAGNOSIS — N93.9 ABNORMAL UTERINE BLEEDING: Primary | ICD-10-CM

## 2017-09-21 PROCEDURE — 99214 OFFICE O/P EST MOD 30 MIN: CPT | Performed by: OBSTETRICS & GYNECOLOGY

## 2017-09-21 NOTE — MR AVS SNAPSHOT
After Visit Summary   9/21/2017    Sha Vidal    MRN: [id number]           Patient Information     Date Of Birth          1970        Visit Information        Provider Department      9/21/2017 4:00 PM Neptali Fournier MD [facility]        Today's Diagnoses     Abnormal uterine bleeding    -  1    Uterine leiomyoma, unspecified location          Care Instructions    You can reach your [place] Care Team any time of the day by calling [phone]. This number will put you in touch with the 24 hour nurse line if the clinic is closed.    To contact your OB/GYN Surgery Scheduler please call [phone]. This is a direct number for your care team between 8 a.m. and 4 p.m. Monday through Friday.    [facility] is open for your convenience: [phone]  Monday through Friday 8 a.m. to 8:30 p.m.  Saturday 9 a.m. to 6 p.m.  Sunday Noon to 6 p.m.    They are closed on all major holidays.              Follow-ups after your visit        Your next 10 appointments already scheduled     Sep 27, 2017  3:10 PM CDT   (Arrive by 2:55 PM)   CHRISS Extremity with Karla Philippe, PT   [facility] [place] [facility] ([facility] [place])    [address] [place] [id number] [phone]            Oct 04, 2017  3:10 PM CDT   CHRISS Extremity with Karla Philippe PT   [facility] [place] [facility] ([facility] [place])    [address]  [place] [address] [phone]            Oct 11, 2017  3:50 PM CDT   CHRISS Extremity with Karla Philippe PT   [facility] [place] [facility] ([facility] [place])    [address]  [place] [address] [phone]              Who to contact     If you have questions or need follow up information about today's clinic visit or your schedule please contact [facility] directly at [phone].  Normal or non-critical lab and imaging results will be communicated to you by MyChart, letter or  "phone within 4 business days after the clinic has received the results. If you do not hear from us within 7 days, please contact the clinic through Kaiima or phone. If you have a critical or abnormal lab result, we will notify you by phone as soon as possible.  Submit refill requests through Kaiima or call your pharmacy and they will forward the refill request to us. Please allow 3 business days for your refill to be completed.          Additional Information About Your Visit        Kaiima Information     Kaiima gives you secure access to your electronic health record. If you see a primary care provider, you can also send messages to your care team and make appointments. If you have questions, please call your primary care clinic.  If you do not have a primary care provider, please call [phone] and they will assist you.        Care EveryWhere ID     This is your Care EveryWhere ID. This could be used by other organizations to access your [place] medical records  [id number]        Your Vitals Were     Pulse Height Last Period BMI (Body Mass Index)          72 5' 2\" (1.575 m) 09/08/2017 (Approximate) 30.18 kg/m2         Blood Pressure from Last 3 Encounters:   09/21/17 110/68   09/14/17 112/78   08/15/17 112/69    Weight from Last 3 Encounters:   09/21/17 165 lb (74.8 kg)   09/14/17 167 lb 3.2 oz (75.8 kg)   09/01/17 161 lb (73 kg)              Today, you had the following     No orders found for display         Today's Medication Changes          These changes are accurate as of: 9/21/17 11:59 PM.  If you have any questions, ask your nurse or doctor.               Stop taking these medicines if you haven't already. Please contact your care team if you have questions.     HYDROcodone-acetaminophen 5-325 MG per tablet   Commonly known as:  NORCO   Stopped by:  Neptali Fuornier MD           oxyCODONE 5 MG IR tablet   Commonly known as:  ROXICODONE   Stopped by:  Neptali Fournier MD           SUMAtriptan 100 MG " tablet   Commonly known as:  IMITREX   Stopped by:  Neptali Fournier MD                    Primary Care Provider Office Phone # Fax #    Anita Quiros PA-C [phone] [address] [place] [address]        Equal Access to Services     [facility] HospitalDAVIS : Hadii aad ku hadasho Soomaali, waaxda luqadaha, qaybta kaalmada adeegyada, waxay idiin hayaan adeharoon khgriselsh lastephen ah. So [facility] [phone].    ATENCIÓN: Si habla español, tiene a collins disposición servicios gratuitos de asistencia lingüística. [facility] [phone].    We comply with applicable federal civil rights laws and [place] laws. We do not discriminate on the basis of race, color, national origin, age, disability sex, sexual orientation or gender identity.            Thank you!     Thank you for choosing [facility]  for your care. Our goal is always to provide you with excellent care. Hearing back from our patients is one way we can continue to improve our services. Please take a few minutes to complete the written survey that you may receive in the mail after your visit with us. Thank you!             Your Updated Medication List - Protect others around you: Learn how to safely use, store and throw away your medicines at www.disposemymeds.org.          This list is accurate as of: 9/21/17 11:59 PM.  Always use your most recent med list.                   Brand Name Dispense Instructions for use Diagnosis    cetirizine 10 MG tablet    zyrTEC     Take 10 mg by mouth daily        D 2000 2000 UNITS tablet   Generic drug:  cholecalciferol      Take 2,000 Units by mouth        FLUoxetine 10 MG capsule    PROzac    90 capsule    TAKE 1 CAPSULE BY MOUTH  DAILY DUE FOR AN ANNUAL  VISIT IN JULY. PLEASE CALL  CLINIC TO SCHEDULE.    Adjustment disorder with mixed anxiety and depressed mood       levothyroxine 25 MCG tablet    SYNTHROID/LEVOTHROID    90 tablet    Take 1 tablet (25 mcg) by mouth daily     Hypothyroidism, unspecified type       MULTI-VITAMINS Tabs      Take 1 tablet by mouth        phentermine 37.5 MG tablet    ADIPEX-P    31 tablet    Take 1 tablet (37.5 mg) by mouth every morning (before breakfast)    Hypothyroidism due to Hashimoto's thyroiditis, CARDIOVASCULAR SCREENING; LDL GOAL LESS THAN 160, Family history of diabetes mellitus

## 2017-09-21 NOTE — NURSING NOTE
"Chief Complaint   Patient presents with     Results     Ultrasound       Initial /68  Pulse 72  Ht 5' 2\" (1.575 m)  Wt 165 lb (74.8 kg)  LMP 2017 (Approximate)  BMI 30.18 kg/m2 Estimated body mass index is 30.18 kg/(m^2) as calculated from the following:    Height as of this encounter: 5' 2\" (1.575 m).    Weight as of this encounter: 165 lb (74.8 kg).  BP completed using cuff size: regular        The following HM Due: NONE      The following patient reported/Care Every where data was sent to:  P ABSTRACT QUALITY INITIATIVES [11152]  NA     patient has appointment for today    Kristen TRACY               "

## 2017-09-22 NOTE — PATIENT INSTRUCTIONS
You can reach your [place] Care Team any time of the day by calling [phone]. This number will put you in touch with the 24 hour nurse line if the clinic is closed.    To contact your OB/GYN Surgery Scheduler please call [phone]. This is a direct number for your care team between 8 a.m. and 4 p.m. Monday through Friday.    [facility] is open for your convenience: [phone]  Monday through Friday 8 a.m. to 8:30 p.m.  Saturday 9 a.m. to 6 p.m.  Sunday Noon to 6 p.m.    They are closed on all major holidays.

## 2017-09-22 NOTE — PROGRESS NOTES
Sha Vidal is a 47 year old female   Patient's last menstrual period was 2017. Tubal ligation for contraception, who presents for evaluation of long standing menorrhagia and a recently noted uterine fibroid found on CT for an unrelated cause. Pt endorses heavy menstrual cycles. Pt says her heavy periods have been happening for the last 10 years.  She reported her periods had gotten better with removal of a fibroid 1.5 yrs ago but, they are getting worse again. Pt endorses the first day or two of period is heavy. She explains her periods keep her from leaving the house at times. 1 super  Pad and tampon would take a couple of hours to fill. Pt explains using a tampon and pad when her period is heavy, and tries to stay close to home. Pt endorses her cycles are regular, every 30 days  Please see the office notes of 2017 for further details.  As a part of her evaluation she underwent an endometrial biopsy which showed the following  FINAL DIAGNOSIS:   Endometrium, biopsy-   -Benign proliferative endometrium with focal disordered proliferation   -Fragment compatible with polyp   -Negative for atypia or malignancy.     Ultrasound done 2017 showed the following    Measurements:  Uterus: 9.2 x 4.9 x 6.2cm.     Position is anteverted.  Contour is irreg w myomata:   1) Mid 4.3 x 3.6 x 4.0cm.      Endo cav: 4 mm         Distorted  Cervix: Wnl, Nabothian Cyst noted       Right ovary: 2.6 x 1.2 x 1.6cm.   Wnl  Left ovary:   2.2 x 1.1 x 1.5 cm.  Complex cyst 1.3 x 1.1 x 1.3cm      Cul de sac: no free fluid      *Other findings:       ===================================  Complete pelvic ultrasound using realtime   transabdominal and transvaginal scanning        Multiple uterine leiomyomata which do affect the endometrium      Note: complex L ovarian cyst        -suggest short term follow up ultrasound or evaluation as clinically appropriate    Risks, benefits, and alternative modes of  therapy discussed at length. Pathophysiology of the disease process reviewed, all of the patients questions answered and informed consent obtained.  I believe the cyst on the ovary is a benign hemorrhagic cyst. I reviewed with the patient. She is otherwise asymptomatic from this. After reviewing with her the findings and reviewing her history of menorrhagia the patient desires to undergo a hysteroscopy with resection of the endometrial filling defect which is presumed to be a fibroid.Does not want to have an endometrial ablation done and does not desire medical therapy. I think she has a good understanding of the nature of the problem the nature of the procedure the risks and benefits the alternatives. All questions of been answered and informed consent obtained    Past Medical History:   Diagnosis Date     Allergic rhinitis, cause unspecified      Bleeding disorder (H)     factor 12     CARDIOVASCULAR SCREENING; LDL GOAL LESS THAN 160 10/31/2010     Hashimoto's thyroiditis      Lateral epicondylitis 3/13/2009     Migraine headache 2/19/2013     NONSPECIFIC MEDICAL HISTORY 2002    Factor XII deficiency (asymptomatic)     NONSPECIFIC MEDICAL HISTORY 2002    GRAYSON positive 1:320, seen by Rheum (negative PHUONG eval)     Past Surgical History:   Procedure Laterality Date     ABDOMEN SURGERY      10/2007     APPENDECTOMY      10/2007     C NONSPECIFIC PROCEDURE      wisdom teeth extraction     C NONSPECIFIC PROCEDURE      Bilateral tubal ligation     C NONSPECIFIC PROCEDURE  1/01    Lasik     C NONSPECIFIC PROCEDURE      explo lap for ARMANDO for SBO     DAVINCI HERNIORRHAPHY VENTRAL N/A 8/15/2017    Procedure: DAVINCI HERNIORRHAPHY VENTRAL;  Robotic assisted incisional and supra-umbilical  hernia repair with mesh;  Surgeon: Damon Hall MD;  Location: [facility] OR      ROS: 10 point ROS neg other than the symptoms noted above in the HPI.  Constitutional: healthy, alert and no distress    (N93.9) Abnormal uterine bleeding   (primary encounter diagnosis)  Comment: We'll schedule patient for hysteroscopy fractional D&C with resection of endometrial fibroid. We'll have the mild sure available. See primary care for a preop H&P. Pre-and postop instructions risks benefits alternatives including realistic expectations discussed  Plan: detailed written plan given    (D25.9) Uterine leiomyoma, unspecified location  Comment: as above  Plan: as above

## 2017-09-25 ENCOUNTER — TELEPHONE (OUTPATIENT)
Dept: OBGYN | Facility: CLINIC | Age: 47
End: 2017-09-25

## 2017-09-25 NOTE — TELEPHONE ENCOUNTER
Surgery:  EXAM UNDER ANESTHESIA DIAGNOSTIC HYSTEROSCOPY, FRACTIONAL D&C WITH RESECTION OF ENDOMETRIAL FIBROID (WITH MYOSURE)  Date:  11/3/17  Time:  9:20 AM  Hospital:  [facility]    Patient advised of the following:  The hospital will contact you 24-48 hours prior to surgery to discuss any pre op instructions.  Contact your insurance company to see if a prior authorization or second opinion is needed.  Please schedule a pre op appointment with your primary physician within 7 days of surgery.  Please schedule an office visit with Dr. Fournier prior to surgery.  No aspirin or Ibuprofen 10 days prior to surgery.  Make arrangements to have someone drive you home from the hospital.    Surgery specific and hospital information mailed to patient.    Information was placed on the surgery calendar in [place].

## 2017-09-25 NOTE — TELEPHONE ENCOUNTER
Surgeon:MARTÍN BEAN   Assist:  No   Location: [facility]   Date/time preference:  Patient convenience     Surgery:  Exam under anesthesia diagnostic hysteroscopy fractional D&C with resection of endometrial fibroid Myosure device available   Length of Surgery:  45min   Diagnosis:  DUB with intrauterine fibroid   Anesthesia type:  GENERAL     Special instructions / equipment:  Myosure device   Am admit or same day: AM ADMIT   Bowel prep: No   Pre op: PCP   Office visit with surgeon prior to surgery: Yes

## 2017-09-27 ENCOUNTER — THERAPY VISIT (OUTPATIENT)
Dept: PHYSICAL THERAPY | Facility: CLINIC | Age: 47
End: 2017-09-27
Payer: COMMERCIAL

## 2017-09-27 DIAGNOSIS — M79.671 PAIN IN BOTH FEET: Primary | ICD-10-CM

## 2017-09-27 DIAGNOSIS — M79.672 PAIN IN BOTH FEET: Primary | ICD-10-CM

## 2017-09-27 PROCEDURE — 97161 PT EVAL LOW COMPLEX 20 MIN: CPT | Mod: GP | Performed by: PHYSICAL THERAPIST

## 2017-09-27 PROCEDURE — 97110 THERAPEUTIC EXERCISES: CPT | Mod: GP | Performed by: PHYSICAL THERAPIST

## 2017-09-27 NOTE — PROGRESS NOTES
Subjective:    HPI Comments: Pain seemed to get worse insidiously in the last 6-8 months. Recently Dx with fibromatosis, wears slings at night.   Hx of stress fx in sesmoid on R 10+ yrs ago, hx of R ankle sprain 6 yrs ago     Sha Vidal is a 47 year old female with a right foot and left foot (R>L) condition.  Condition occurred with:  Insidious onset.  Condition occurred: for unknown reasons.  This is a recurrent condition  Pain ongoing for 8-12 months or more.    Patient reports pain:  Longitudinal arch.  Radiates to:  Ankle, lower leg, knee and thigh.  Pain is described as aching and is constant and reported as 5/10 and 9/10.  Associated symptoms:  Loss of strength and loss of motion/stiffness. Pain is worse in the A.M. and worse in the P.M..  Symptoms are exacerbated by walking, descending stairs, ascending stairs and weight bearing and relieved by ice, anti-inflammatory cream.  Since onset symptoms are gradually worsening.        General health as reported by patient is good.  Pertinent medical history includes:  Thyroid problems, recent hernia surgery.  Medical allergies: no.  Other surgeries include:  None reported.  Current medications:  Anti-depressants.  Current occupation is Business analysist and  Patient is working in normal job without restrictions, but currently on 20# lifting restrictions for a fefw more weeks due to hernia surgery.  Primary job tasks include:  prolonged sitting.     Barriers include:  None as reported by patient.     Red flags:  None as reported by patient.                      Objective:      Gait:    Gait Type:  Normal               Ankle/Foot Evaluation  ROM:  AROM is normal.PROM is normal.      Strength is normal.  LIGAMENT TESTING: normal              SPECIAL TESTS: Special tests ankle: NE with repeated movement testing.     PALPATION: Palpation of ankle: tender fibroids on mid-bottom of plantarfascia.         FUNCTIONAL TESTS: Functional test ankle: Eccentric loads on  stairs without effect.                                                               General     ROS    Assessment/Plan:      Patient is a 47 year old female with both sides ankle complaints.    Patient has the following significant findings with corresponding treatment plan.                Diagnosis 1:  B plantarfascosis  Pain -  self management, education, directional preference exercise and home program  Impaired gait - gait training, assistive devices and home program  Decreased function - therapeutic activities and home program    Therapy Evaluation Codes:   1) History comprised of:   Personal factors that impact the plan of care:      Past/current experiences.    Comorbidity factors that impact the plan of care are:      Depression and Overweight.     Medications impacting care: None.  2) Examination of Body Systems comprised of:   Body structures and functions that impact the plan of care:      Ankle.   Activity limitations that impact the plan of care are:      Standing and Walking.  3) Clinical presentation characteristics are:   Stable/Uncomplicated.  4) Decision-Making    Low complexity using standardized patient assessment instrument and/or measureable assessment of functional outcome.  Cumulative Therapy Evaluation is: Low complexity.    Previous and current functional limitations:  (See Goal Flow Sheet for this information)    Short term and Long term goals: (See Goal Flow Sheet for this information)     Communication ability:  Patient appears to be able to clearly communicate and understand verbal and written communication and follow directions correctly.  Treatment Explanation - The following has been discussed with the patient:   RX ordered/plan of care  Anticipated outcomes  Possible risks and side effects  This patient would benefit from PT intervention to resume normal activities.   Rehab potential is good.    Frequency:  1 X week, once daily  Duration:  for 6 weeks  Discharge Plan:  Independent  in home treatment program.  Reach maximal therapeutic benefit.    Please refer to the daily flowsheet for treatment today, total treatment time and time spent performing 1:1 timed codes.

## 2017-09-27 NOTE — MR AVS SNAPSHOT
After Visit Summary   9/27/2017    Sha Vidal    MRN: [id number]           Patient Information     Date Of Birth          1970        Visit Information        Provider Department      9/27/2017 3:10 PM Karla Philippe, PT [facility]        Today's Diagnoses     Pain in both feet    -  1       Follow-ups after your visit        Your next 10 appointments already scheduled     Oct 04, 2017  3:10 PM CDT   CHRISS Extremity with Karla Philippe PT   [facility] [place] [facility] ([facility] [place])    [address]  [place] [address]   [phone]            Oct 11, 2017  3:50 PM CDT   CHRISS Extremity with Karla Philippe PT   [facility] [place] [facility] ([facility] [place])    [address]  [place] [address]   [phone]            Oct 23, 2017 11:00 AM CDT   SHORT with Neptali Fournier MD   [facility]    [address]  [facility] [address]   [phone]            Nov 03, 2017   Procedure with Neptali Fournier MD   [facility] (--)    [address]  [facility] [address]   [phone]              Who to contact     If you have questions or need follow up information about today's clinic visit or your schedule please contact [facility] directly at [phone].  Normal or non-critical lab and imaging results will be communicated to you by MyChart, letter or phone within 4 business days after the clinic has received the results. If you do not hear from us within 7 days, please contact the clinic through [facility] or phone. If you have a critical or abnormal lab result, we will notify you by phone as soon as possible.  Submit refill requests through [facility] or call your pharmacy and they will forward the refill request to us. Please allow 3 business days for your refill to be completed.          Additional  Information About Your Visit        MyChart Information     SeeMe gives you secure access to your electronic health record. If you see a primary care provider, you can also send messages to your care team and make appointments. If you have questions, please call your primary care clinic.  If you do not have a primary care provider, please call [phone] and they will assist you.        Care EveryWhere ID     This is your Care EveryWhere ID. This could be used by other organizations to access your [place] medical records  [id number]        Your Vitals Were     Last Period                   09/08/2017 (Approximate)            Blood Pressure from Last 3 Encounters:   09/21/17 110/68   09/14/17 112/78   08/15/17 112/69    Weight from Last 3 Encounters:   09/21/17 74.8 kg (165 lb)   09/14/17 75.8 kg (167 lb 3.2 oz)   09/01/17 73 kg (161 lb)              We Performed the Following     HC PT EVAL, LOW COMPLEXITY     CHRISS INITIAL EVAL REPORT     THERAPEUTIC EXERCISES        Primary Care Provider Office Phone # Fax #    Anita Quiros PA-C [phone] [address] [address] [facility] [address]        Equal Access to Services     [facility] CON[facility] AH: Hadii aad ku hadasho Soomaali, waaxda luqadaha, qaybta kaalmada adeegyada, morales rincon hayorionn alejandrina bland ah. So [facility] [phone].    ATENCIÓN: Si habla español, tiene a collins disposición servicios gratuitos de asistencia lingüística. [facility] [phone].    We comply with applicable federal civil rights laws and [place] laws. We do not discriminate on the basis of race, color, national origin, age, disability sex, sexual orientation or gender identity.            Thank you!     Thank you for choosing [facility]  for your care. Our goal is always to provide you with excellent care. Hearing back from our patients is one way we can continue to improve our services. Please take a few minutes to complete the  written survey that you may receive in the mail after your visit with us. Thank you!             Your Updated Medication List - Protect others around you: Learn how to safely use, store and throw away your medicines at www.disposemymeds.org.          This list is accurate as of: 9/27/17  3:52 PM.  Always use your most recent med list.                   Brand Name Dispense Instructions for use Diagnosis    cetirizine 10 MG tablet    zyrTEC     Take 10 mg by mouth daily        D 2000 2000 UNITS tablet   Generic drug:  cholecalciferol      Take 2,000 Units by mouth        FLUoxetine 10 MG capsule    PROzac    90 capsule    TAKE 1 CAPSULE BY MOUTH  DAILY DUE FOR AN ANNUAL  VISIT IN JULY. PLEASE CALL  CLINIC TO SCHEDULE.    Adjustment disorder with mixed anxiety and depressed mood       levothyroxine 25 MCG tablet    SYNTHROID/LEVOTHROID    90 tablet    Take 1 tablet (25 mcg) by mouth daily    Hypothyroidism, unspecified type       MULTI-VITAMINS Tabs      Take 1 tablet by mouth        phentermine 37.5 MG tablet    ADIPEX-P    31 tablet    Take 1 tablet (37.5 mg) by mouth every morning (before breakfast)    Hypothyroidism due to Hashimoto's thyroiditis, CARDIOVASCULAR SCREENING; LDL GOAL LESS THAN 160, Family history of diabetes mellitus

## 2017-09-27 NOTE — LETTER
[facility]  [address] [place] [address]  [phone]    2017    Re: Sha Vidal   :   1970  MRN:  [id number]   REFERRING PHYSICIAN:   Sena Curran    [facility]    Date of Initial Evaluation:  2017  Visits:  Rxs Used: 1  Reason for Referral:  Pain in both feet    EVALUATION SUMMARY    Subjective:  HPI Comments: Pain seemed to get worse insidiously in the last 6-8 months. Recently Dx with fibromatosis, wears slings at night.   Hx of stress fx in sesmoid on R 10+ yrs ago, hx of R ankle sprain 6 yrs ago  Sha Vidal is a 47 year old female with a right foot and left foot (R>L) condition.  Condition occurred with:  Insidious onset.  Condition occurred: for unknown reasons.  This is a recurrent condition  Pain ongoing for 8-12 months or more.    Patient reports pain:  Longitudinal arch.  Radiates to:  Ankle, lower leg, knee and thigh.  Pain is described as aching and is constant and reported as 5/10 and 9/10.  Associated symptoms:  Loss of strength and loss of motion/stiffness. Pain is worse in the A.M. and worse in the P.M..  Symptoms are exacerbated by walking, descending stairs, ascending stairs and weight bearing and relieved by ice, anti-inflammatory cream.  Since onset symptoms are gradually worsening.        General health as reported by patient is good.  Pertinent medical history includes:  Thyroid problems, recent hernia surgery.  Medical allergies: no.  Other surgeries include:  None reported.  Current medications:  Anti-depressants.  Current occupation is Business analysist and  Patient is working in normal job without restrictions, but currently on 20# lifting restrictions for a fefw more weeks due to hernia surgery.  Primary job tasks include:  prolonged sitting. Barriers include:  None as reported by patient. Red flags:  None as reported by patient.    Objective:  Gait:    Gait Type:   Normal     Ankle/Foot Evaluation  ROM:  AROM is normal.PROM is normal.  Strength is normal.  LIGAMENT TESTING: normal  SPECIAL TESTS: Special tests ankle: NE with repeated movement testing.   PALPATION: Palpation of ankle: tender fibroids on mid-bottom of plantarfascia.   FUNCTIONAL TESTS: Functional test ankle: Eccentric loads on stairs without effect.     Assessment/Plan:    Patient is a 47 year old female with both sides ankle complaints.    Patient has the following significant findings with corresponding treatment plan.                Diagnosis 1:  B plantarfascosis  Pain -  self management, education, directional preference exercise and home program  Impaired gait - gait training, assistive devices and home program  Decreased function - therapeutic activities and home program    Therapy Evaluation Codes:   1) History comprised of:   Personal factors that impact the plan of care:      Past/current experiences.    Comorbidity factors that impact the plan of care are:      Depression and Overweight.     Medications impacting care: None.  2) Examination of Body Systems comprised of:   Body structures and functions that impact the plan of care:      Ankle.   Activity limitations that impact the plan of care are:      Standing and Walking.  3) Clinical presentation characteristics are:   Stable/Uncomplicated.  4) Decision-Making    Low complexity using standardized patient assessment instrument and/or measureable assessment of functional outcome.  Cumulative Therapy Evaluation is: Low complexity.    Previous and current functional limitations:  (See Goal Flow Sheet for this information)    Short term and Long term goals: (See Goal Flow Sheet for this information)     Communication ability:  Patient appears to be able to clearly communicate and understand verbal and written communication and follow directions correctly.  Treatment Explanation - The following has been discussed with the patient:   RX ordered/plan of  care  Anticipated outcomes  Possible risks and side effects  This patient would benefit from PT intervention to resume normal activities.   Rehab potential is good.    Frequency:  1 X week, once daily  Duration:  for 6 weeks  Discharge Plan:  Independent in home treatment program.  Reach maximal therapeutic benefit.    Thank you for your referral.    INQUIRIES  Therapist: Karla Philippe MSPT, Adarsh CRAFTT   [facility]  [address]  [facility] [address]  Phone: [phone]  Fax: [phone]

## 2017-10-04 ENCOUNTER — THERAPY VISIT (OUTPATIENT)
Dept: PHYSICAL THERAPY | Facility: CLINIC | Age: 47
End: 2017-10-04
Payer: COMMERCIAL

## 2017-10-04 DIAGNOSIS — M79.671 PAIN IN BOTH FEET: Primary | ICD-10-CM

## 2017-10-04 DIAGNOSIS — M79.672 PAIN IN BOTH FEET: Primary | ICD-10-CM

## 2017-10-04 PROCEDURE — 97530 THERAPEUTIC ACTIVITIES: CPT | Mod: GP | Performed by: PHYSICAL THERAPIST

## 2017-10-04 PROCEDURE — 97110 THERAPEUTIC EXERCISES: CPT | Mod: GP | Performed by: PHYSICAL THERAPIST

## 2017-10-06 ENCOUNTER — HEALTH MAINTENANCE LETTER (OUTPATIENT)
Age: 47
End: 2017-10-06

## 2017-10-11 ENCOUNTER — THERAPY VISIT (OUTPATIENT)
Dept: PHYSICAL THERAPY | Facility: CLINIC | Age: 47
End: 2017-10-11
Payer: COMMERCIAL

## 2017-10-11 DIAGNOSIS — M79.671 PAIN IN BOTH FEET: Primary | ICD-10-CM

## 2017-10-11 DIAGNOSIS — M79.672 PAIN IN BOTH FEET: Primary | ICD-10-CM

## 2017-10-11 PROCEDURE — 97035 APP MDLTY 1+ULTRASOUND EA 15: CPT | Mod: GP | Performed by: PHYSICAL THERAPIST

## 2017-10-11 PROCEDURE — 97110 THERAPEUTIC EXERCISES: CPT | Mod: GP | Performed by: PHYSICAL THERAPIST

## 2017-10-16 DIAGNOSIS — F43.23 ADJUSTMENT DISORDER WITH MIXED ANXIETY AND DEPRESSED MOOD: ICD-10-CM

## 2017-10-17 NOTE — TELEPHONE ENCOUNTER
FLUoxetine (PROZAC) 10 MG     Last Written Prescription Date: 7/17/17  Last Fill Quantity: 90, # refills: 3  Last Office Visit with G primary care provider:  8/1/17   Next 5 appointments (look out 90 days)     Oct 20, 2017  8:10 AM CDT   Oswaldo Forrester with Anita Quiros PA-C   [facility] ([address] [address] [phone]            Oct 23, 2017 11:00 AM CDT   SHORT with Neptali Fournier MD   [facility]    [address]  [facility] [address] [phone]                   Last PHQ-9 score on record=   PHQ-9 SCORE 7/29/2016   Total Score -   Total Score 2

## 2017-10-18 RX ORDER — FLUOXETINE 10 MG/1
CAPSULE ORAL
Qty: 90 CAPSULE | Refills: 0 | OUTPATIENT
Start: 2017-10-18

## 2017-10-18 NOTE — TELEPHONE ENCOUNTER
Duplicate. One year supply sent in July.  Denial sent, with note to pharmacy to refill.     Sena Holden RN -- [facility]

## 2017-10-19 ENCOUNTER — THERAPY VISIT (OUTPATIENT)
Dept: PHYSICAL THERAPY | Facility: CLINIC | Age: 47
End: 2017-10-19
Payer: COMMERCIAL

## 2017-10-19 DIAGNOSIS — M79.671 PAIN IN BOTH FEET: Primary | ICD-10-CM

## 2017-10-19 DIAGNOSIS — M79.672 PAIN IN BOTH FEET: Primary | ICD-10-CM

## 2017-10-19 PROCEDURE — 97035 APP MDLTY 1+ULTRASOUND EA 15: CPT | Mod: GP | Performed by: PHYSICAL THERAPIST

## 2017-10-19 PROCEDURE — 97110 THERAPEUTIC EXERCISES: CPT | Mod: GP | Performed by: PHYSICAL THERAPIST

## 2017-10-20 ENCOUNTER — OFFICE VISIT (OUTPATIENT)
Dept: FAMILY MEDICINE | Facility: CLINIC | Age: 47
End: 2017-10-20
Payer: COMMERCIAL

## 2017-10-20 VITALS
WEIGHT: 168.5 LBS | OXYGEN SATURATION: 99 % | SYSTOLIC BLOOD PRESSURE: 114 MMHG | RESPIRATION RATE: 18 BRPM | BODY MASS INDEX: 31.01 KG/M2 | HEART RATE: 75 BPM | TEMPERATURE: 97.7 F | DIASTOLIC BLOOD PRESSURE: 80 MMHG | HEIGHT: 62 IN

## 2017-10-20 DIAGNOSIS — Z12.31 VISIT FOR SCREENING MAMMOGRAM: ICD-10-CM

## 2017-10-20 DIAGNOSIS — N93.9 ABNORMAL UTERINE BLEEDING: ICD-10-CM

## 2017-10-20 DIAGNOSIS — Z01.818 PREOP GENERAL PHYSICAL EXAM: Primary | ICD-10-CM

## 2017-10-20 DIAGNOSIS — E03.9 HYPOTHYROIDISM, UNSPECIFIED TYPE: ICD-10-CM

## 2017-10-20 DIAGNOSIS — D25.9 UTERINE LEIOMYOMA, UNSPECIFIED LOCATION: ICD-10-CM

## 2017-10-20 DIAGNOSIS — Z23 NEED FOR PROPHYLACTIC VACCINATION AND INOCULATION AGAINST INFLUENZA: ICD-10-CM

## 2017-10-20 PROCEDURE — 90471 IMMUNIZATION ADMIN: CPT | Performed by: PHYSICIAN ASSISTANT

## 2017-10-20 PROCEDURE — 99214 OFFICE O/P EST MOD 30 MIN: CPT | Mod: 25 | Performed by: PHYSICIAN ASSISTANT

## 2017-10-20 PROCEDURE — 90686 IIV4 VACC NO PRSV 0.5 ML IM: CPT | Performed by: PHYSICIAN ASSISTANT

## 2017-10-20 NOTE — MR AVS SNAPSHOT
After Visit Summary   10/20/2017    Sha Vidal    MRN: [id number]           Patient Information     Date Of Birth          1970        Visit Information        Provider Department      10/20/2017 8:10 AM Anita Quiros PA-C [facility]        Today's Diagnoses     Preop general physical exam    -  1    Abnormal uterine bleeding        Uterine leiomyoma, unspecified location        Hypothyroidism, unspecified type        Visit for screening mammogram        Need for prophylactic vaccination and inoculation against influenza          Care Instructions      Before Your Surgery      Call your surgeon if there is any change in your health. This includes signs of a cold or flu (such as a sore throat, runny nose, cough, rash or fever).    Do not smoke, drink alcohol or take over the counter medicine (unless your surgeon or primary care doctor tells you to) for the 24 hours before and after surgery.    If you take prescribed drugs: Follow your doctor s orders about which medicines to take and which to stop until after surgery.    Eating and drinking prior to surgery: follow the instructions from your surgeon    Take a shower or bath the night before surgery. Use the soap your surgeon gave you to gently clean your skin. If you do not have soap from your surgeon, use your regular soap. Do not shave or scrub the surgery site.  Wear clean pajamas and have clean sheets on your bed.           Follow-ups after your visit        Your next 10 appointments already scheduled     Oct 23, 2017 11:00 AM CDT   SHORT with Neptali Fournier MD   [facility]    [address] [place]  [facility] [address]   [phone]            Oct 25, 2017  5:10 PM CDT   CHRISS Extremity with Karla Philippe PT   [facility] ([facility])    [address] [place] [address]   [phone]            Nov 02, 2017  3:50 PM  CDT   CHRISS Extremity with Karla Philippe, PT   [facility] ([facility] [place])    [address] [place] [address] [phone]            Nov 03, 2017   Procedure with Neptali Fournier MD   [facility] (--)    [address] [place] [address] [phone]              Future tests that were ordered for you today     Open Future Orders        Priority Expected Expires Ordered    MA SCREENING DIGITAL BILAT - Future  (s+30) Routine  10/20/2018 10/20/2017            Who to contact     If you have questions or need follow up information about today's clinic visit or your schedule please contact [facility] directly at [phone].  Normal or non-critical lab and imaging results will be communicated to you by Adwantedhart, letter or phone within 4 business days after the clinic has received the results. If you do not hear from us within 7 days, please contact the clinic through Adwantedhart or phone. If you have a critical or abnormal lab result, we will notify you by phone as soon as possible.  Submit refill requests through TradeBlock or call your pharmacy and they will forward the refill request to us. Please allow 3 business days for your refill to be completed.          Additional Information About Your Visit        TradeBlock Information     TradeBlock gives you secure access to your electronic health record. If you see a primary care provider, you can also send messages to your care team and make appointments. If you have questions, please call your primary care clinic.  If you do not have a primary care provider, please call [phone] and they will assist you.        Care EveryWhere ID     This is your Care EveryWhere ID. This could be used by other organizations to access your [facility] medical records  [id number]        Your Vitals Were     Pulse Temperature Respirations Height Last Period Pulse Oximetry    75 97.7  F (36.5  C) (Oral) 18 5'  "2\" (1.575 m) 10/05/2017 (Exact Date) 99%    Breastfeeding? BMI (Body Mass Index)                No 30.82 kg/m2           Blood Pressure from Last 3 Encounters:   10/20/17 114/80   09/21/17 110/68   09/14/17 112/78    Weight from Last 3 Encounters:   10/20/17 168 lb 8 oz (76.4 kg)   09/21/17 165 lb (74.8 kg)   09/14/17 167 lb 3.2 oz (75.8 kg)              We Performed the Following     FLU VAC, SPLIT VIRUS IM > 3 YO (QUADRIVALENT) [64999]     Vaccine Administration, Initial [52027]        Primary Care Provider Office Phone # Fax #    Anita Quiros PA-C [phone] [address] [facility] [address]        Equal Access to Services     [facility] : Hadii melodie pearce hadasho Soluci, waaxda luqadaha, qaybta kaalmada adeegyada, morales bland . So [facility] [phone].    ATENCIÓN: Si habla español, tiene a collins disposición servicios gratuitos de asistencia lingüística. Llame al [phone].    We comply with applicable federal civil rights laws and [place] laws. We do not discriminate on the basis of race, color, national origin, age, disability, sex, sexual orientation, or gender identity.            Thank you!     Thank you for choosing [facility]  for your care. Our goal is always to provide you with excellent care. Hearing back from our patients is one way we can continue to improve our services. Please take a few minutes to complete the written survey that you may receive in the mail after your visit with us. Thank you!             Your Updated Medication List - Protect others around you: Learn how to safely use, store and throw away your medicines at www.disposemymeds.org.          This list is accurate as of: 10/20/17  8:16 AM.  Always use your most recent med list.                   Brand Name Dispense Instructions for use Diagnosis    cetirizine 10 MG tablet    zyrTEC     Take 10 mg by mouth daily        D 2000 2000 UNITS tablet   Generic " drug:  cholecalciferol      Take 2,000 Units by mouth        FLUoxetine 10 MG capsule    PROzac    90 capsule    TAKE 1 CAPSULE BY MOUTH  DAILY DUE FOR AN ANNUAL  VISIT IN JULY. PLEASE CALL  CLINIC TO SCHEDULE.    Adjustment disorder with mixed anxiety and depressed mood       levothyroxine 25 MCG tablet    SYNTHROID/LEVOTHROID    90 tablet    Take 1 tablet (25 mcg) by mouth daily    Hypothyroidism, unspecified type       MULTI-VITAMINS Tabs      Take 1 tablet by mouth        phentermine 37.5 MG tablet    ADIPEX-P    31 tablet    Take 1 tablet (37.5 mg) by mouth every morning (before breakfast)    Hypothyroidism due to Hashimoto's thyroiditis, CARDIOVASCULAR SCREENING; LDL GOAL LESS THAN 160, Family history of diabetes mellitus

## 2017-10-20 NOTE — PROGRESS NOTES
[facility]  [address] [facility] [address] [phone]  Dept: [phone]    PRE-OP EVALUATION:  Today's date: 10/20/2017    Sha Vidal (: 1970) presents for pre-operative evaluation assessment as requested by Dr. Fournier.  She requires evaluation and anesthesia risk assessment prior to undergoing surgery/procedure for treatment of uterine fibroids.  Proposed procedure: COMBINED DILATION AND CURETTAGE, OPERATIVE HYSTEROSCOPY WITH MORCELLATOR    Date of Surgery/ Procedure: 11/3/17  Time of Surgery/ Procedure: 9:10 AM  Hospital/Surgical [facility]  Primary Physician: Anita Quiros  Type of Anesthesia Anticipated: to be determined    Patient has a Health Care Directive or Living Will:  NO    1. NO - Do you have a history of heart attack, stroke, stent, bypass or surgery on an artery in the head, neck, heart or legs?  2. NO - Do you ever have any pain or discomfort in your chest?  3. NO - Do you have a history of  Heart Failure?  4. NO - Are you troubled by shortness of breath when: walking on the level, up a slight hill or at night?  5. NO - Do you currently have a cold, bronchitis or other respiratory infection?  6. NO - Do you have a cough, shortness of breath or wheezing?  7. NO - Do you sometimes get pains in the calves of your legs when you walk?  8. NO - Do you or anyone in your family have previous history of blood clots?  9. NO - Do you or does anyone in your family have a serious bleeding problem such as prolonged bleeding following surgeries or cuts?  10. NO - Have you ever had problems with anemia or been told to take iron pills?  11. NO - Have you had any abnormal blood loss such as black, tarry or bloody stools, or abnormal vaginal bleeding?  12. NO - Have you ever had a blood transfusion?  13. NO - Have you or any of your relatives ever had problems with anesthesia?  14. NO - Do you have sleep apnea, excessive snoring or daytime  drowsiness?  15. NO - Do you have any prosthetic heart valves?  16. NO - Do you have prosthetic joints?  17. NO - Is there any chance that you may be pregnant?        HPI:                                                      Brief HPI related to upcoming procedure: Patient has history of heavy menses, she was found to have uterine fibroid, will be having D/C with removal of fibroid to hopefully help reduce heavy bleeding. LMP was 10/5/17.      HYPOTHYROIDISM - Patient has a longstanding history of chronic Hypothyroidism. Patient has been doing well, noting no tremor, insomnia, hair loss or changes in skin texture. Last TSH value of 2.93. Continues to take medications as directed, without adverse reactions or side effects.                                                                                                                                                                                                                        .    MEDICAL HISTORY:                                                    Patient Active Problem List    Diagnosis Date Noted     Pain in both feet 09/27/2017     Priority: Medium     Abnormal uterine bleeding 09/14/2017     Priority: Medium     Vitiligo 07/17/2017     Priority: Medium     Supraumbilical hernia 07/17/2017     Priority: Medium     Uterine leiomyoma, unspecified location 07/17/2017     Priority: Medium     Adjustment disorder with mixed anxiety and depressed mood 07/17/2017     Priority: Medium     Overweight 07/28/2015     Priority: Medium     Migraine headache 02/19/2013     Priority: Medium     CARDIOVASCULAR SCREENING; LDL GOAL LESS THAN 160 10/31/2010     Priority: Medium     Hypothyroidism 11/18/2009     Priority: Medium      Past Medical History:   Diagnosis Date     Allergic rhinitis, cause unspecified      Bleeding disorder (H)     factor 12     CARDIOVASCULAR SCREENING; LDL GOAL LESS THAN 160 10/31/2010     Hashimoto's thyroiditis      Lateral epicondylitis  3/13/2009     Migraine headache 2/19/2013     NONSPECIFIC MEDICAL HISTORY 2002    Factor XII deficiency (asymptomatic)     NONSPECIFIC MEDICAL HISTORY 2002    GRAYSON positive 1:320, seen by Rheum (negative PHUONG eval)     Past Surgical History:   Procedure Laterality Date     ABDOMEN SURGERY      10/2007     APPENDECTOMY      10/2007     C NONSPECIFIC PROCEDURE      wisdom teeth extraction     C NONSPECIFIC PROCEDURE      Bilateral tubal ligation     C NONSPECIFIC PROCEDURE  1/01    Lasik     C NONSPECIFIC PROCEDURE      explo lap for ARMANDO for SBO     DAVINCI HERNIORRHAPHY VENTRAL N/A 8/15/2017    Procedure: DAVINCI HERNIORRHAPHY VENTRAL;  Robotic assisted incisional and supra-umbilical  hernia repair with mesh;  Surgeon: Damon Hall MD;  Location: [facility] OR     Current Outpatient Prescriptions   Medication Sig Dispense Refill     FLUoxetine (PROZAC) 10 MG capsule TAKE 1 CAPSULE BY MOUTH  DAILY DUE FOR AN ANNUAL  VISIT IN JULY. PLEASE CALL  CLINIC TO SCHEDULE. 90 capsule 3     cetirizine (ZYRTEC) 10 MG tablet Take 10 mg by mouth daily       cholecalciferol (D 2000) 2000 UNITS tablet Take 2,000 Units by mouth       Multiple Vitamin (MULTI-VITAMINS) TABS Take 1 tablet by mouth       phentermine (ADIPEX-P) 37.5 MG tablet Take 1 tablet (37.5 mg) by mouth every morning (before breakfast) 31 tablet 2     levothyroxine (SYNTHROID/LEVOTHROID) 25 MCG tablet Take 1 tablet (25 mcg) by mouth daily 90 tablet 3     OTC products: None, except as noted above, no recent use of OTC ASA, NSAIDS or Steroids and no use of herbal medications or other supplements    Allergies   Allergen Reactions     No Known Drug Allergies       Latex Allergy: NO    Social History   Substance Use Topics     Smoking status: Never Smoker     Smokeless tobacco: Never Used     Alcohol use 0.0 oz/week     0 Standard drinks or equivalent per week      Comment: occasionally     History   Drug Use No       REVIEW OF SYSTEMS:                                    "                 C: NEGATIVE for fever, chills, change in weight  I: NEGATIVE for worrisome rashes, moles or lesions  E: NEGATIVE for vision changes or irritation  E/M: NEGATIVE for ear, mouth and throat problems  R: NEGATIVE for significant cough or SOB  B: NEGATIVE for masses, tenderness or discharge  CV: NEGATIVE for chest pain, palpitations or peripheral edema  GI: NEGATIVE for nausea, abdominal pain, heartburn, or change in bowel habits  : NEGATIVE for frequency, dysuria, or hematuria  M: NEGATIVE for significant arthralgias or myalgia  N: NEGATIVE for weakness, dizziness or paresthesias  E: NEGATIVE for temperature intolerance, skin/hair changes  H: NEGATIVE for bleeding problems  P: NEGATIVE for changes in mood or affect    EXAM:                                                    /80 (BP Location: Right arm, Patient Position: Chair, Cuff Size: Adult Regular)  Pulse 75  Temp 97.7  F (36.5  C) (Oral)  Resp 18  Ht 5' 2\" (1.575 m)  Wt 168 lb 8 oz (76.4 kg)  LMP 10/05/2017 (Exact Date)  SpO2 99%  Breastfeeding? No  BMI 30.82 kg/m2    GENERAL APPEARANCE: healthy, alert and no distress     EYES: EOMI, PERRL     HENT: ear canals and TM's normal and nose and mouth without ulcers or lesions     NECK: no adenopathy, no asymmetry, masses, or scars and thyroid normal to palpation     RESP: lungs clear to auscultation - no rales, rhonchi or wheezes     CV: regular rates and rhythm, normal S1 S2, no S3 or S4 and no murmur, click or rub     ABDOMEN:  soft, nontender, no HSM or masses and bowel sounds normal     MS: extremities normal- no gross deformities noted, no evidence of inflammation in joints, FROM in all extremities.     SKIN: no suspicious lesions or rashes     NEURO: Normal strength and tone, sensory exam grossly normal, mentation intact and speech normal     PSYCH: mentation appears normal. and affect normal/bright     LYMPHATICS: No axillary, cervical, or supraclavicular nodes    DIAGNOSTICS:     "                                                No labs or EKG required.    Recent Labs   Lab Test  08/03/17   1106  08/01/17   0803  07/17/17   0907  07/09/17   1925 04/28/17 08/04/16   0748   11/24/09   0641   HGB  11.8  11.9  11.7  11.3*   --    --    < >   --    PLT  354   --   340  320   --    --    < >   --    INR   --    --    --    --    --    --    --   1.02   NA   --    --   140  137   --    --    < >   --    POTASSIUM   --    --   4.4  3.7   --    --    < >   --    CR   --    --   0.83  0.90   --    --    < >   --    A1C   --    --    --    --   5.0  5.0   --    --     < > = values in this interval not displayed.        IMPRESSION:                                                    Reason for surgery/procedure: D/C, hysteroscopy with morcellator  Diagnosis/reason for consult: abnormal uterine bleeding, uterine leiomyoma    The proposed surgical procedure is considered INTERMEDIATE risk.    REVISED CARDIAC RISK INDEX  The patient has the following serious cardiovascular risks for perioperative complications such as (MI, PE, VFib and 3  AV Block):  No serious cardiac risks  INTERPRETATION: 0 risks: Class I (very low risk - 0.4% complication rate)    The patient has the following additional risks for perioperative complications:  No identified additional risks      ICD-10-CM    1. Preop general physical exam Z01.818    2. Abnormal uterine bleeding N93.9    3. Hypothyroidism, unspecified type E03.9    4. Visit for screening mammogram Z12.31 MA SCREENING DIGITAL BILAT - Future  (s+30)   5. Need for prophylactic vaccination and inoculation against influenza Z23 FLU VAC, SPLIT VIRUS IM > 3 YO (QUADRIVALENT) [75965]     Vaccine Administration, Initial [02094]       RECOMMENDATIONS:                                                      --Consult hospital rounder / IM to assist post-op medical management  --Patient is to skip all medication day of procedure except may take Synthroid with small sip of water am of  surgery.  --Patient understands NPO recommendations  --Patient advised to stop all NSAIDs, ASA and steroids 7-10 days prior to procedure.  --Patient understands where and when to be for surgery.    APPROVAL GIVEN to proceed with proposed procedure, without further diagnostic evaluation       Signed Electronically by: Anita Quiros PA-C    Copy of this evaluation report is provided to requesting physician.    [facility] Preop Guidelines  Injectable Influenza Immunization Documentation    1.  Is the person to be vaccinated sick today?   No    2. Does the person to be vaccinated have an allergy to a component   of the vaccine?   No  Egg Allergy Algorithm Link    3. Has the person to be vaccinated ever had a serious reaction   to influenza vaccine in the past?   No    4. Has the person to be vaccinated ever had Guillain-Barré syndrome?   No    Form completed by Alexei Louise MA

## 2017-10-20 NOTE — NURSING NOTE
"Chief Complaint   Patient presents with     Pre-Op Exam     11/3       Initial /80 (BP Location: Right arm, Patient Position: Chair, Cuff Size: Adult Regular)  Pulse 75  Temp 97.7  F (36.5  C) (Oral)  Resp 18  Ht 5' 2\" (1.575 m)  Wt 168 lb 8 oz (76.4 kg)  LMP 10/05/2017 (Exact Date)  SpO2 99%  Breastfeeding? No  BMI 30.82 kg/m2 Estimated body mass index is 30.82 kg/(m^2) as calculated from the following:    Height as of this encounter: 5' 2\" (1.575 m).    Weight as of this encounter: 168 lb 8 oz (76.4 kg).  Medication Reconciliation: complete   Alexei Louise MA      "

## 2017-10-23 ENCOUNTER — OFFICE VISIT (OUTPATIENT)
Dept: OBGYN | Facility: CLINIC | Age: 47
End: 2017-10-23
Payer: COMMERCIAL

## 2017-10-23 VITALS — BODY MASS INDEX: 30.73 KG/M2 | DIASTOLIC BLOOD PRESSURE: 78 MMHG | SYSTOLIC BLOOD PRESSURE: 110 MMHG | WEIGHT: 168 LBS

## 2017-10-23 DIAGNOSIS — N93.9 ABNORMAL UTERINE BLEEDING: ICD-10-CM

## 2017-10-23 DIAGNOSIS — D25.9 UTERINE LEIOMYOMA, UNSPECIFIED LOCATION: Primary | ICD-10-CM

## 2017-10-23 PROCEDURE — 99213 OFFICE O/P EST LOW 20 MIN: CPT | Performed by: OBSTETRICS & GYNECOLOGY

## 2017-10-23 NOTE — NURSING NOTE
"Chief Complaint   Patient presents with     Consult     11/3       Initial /78  Wt 168 lb (76.2 kg)  LMP 10/05/2017 (Exact Date)  BMI 30.73 kg/m2 Estimated body mass index is 30.73 kg/(m^2) as calculated from the following:    Height as of 10/20/17: 5' 2\" (1.575 m).    Weight as of this encounter: 168 lb (76.2 kg).  BP completed using cuff size: regular        The following HM Due:       The following patient reported/Care Every where data was sent to:  P ABSTRACT QUALITY INITIATIVES [01787]       Isa Peña CMA                "

## 2017-10-23 NOTE — PATIENT INSTRUCTIONS
You can reach your [facility] Care Team any time of the day by calling [phone]. This number will put you in touch with the 24 hour nurse line if the clinic is closed.    To contact your OB/GYN Station Coordinator/Surgery Scheduler please call [phone]. This is a direct number for your care team between 8 a.m. and 4 p.m. Monday through Friday.    [facility] is open for your convenience:  Monday through Friday 8 a.m. to 6 p.m.  Closed weekends and all major holidays.

## 2017-10-23 NOTE — MR AVS SNAPSHOT
After Visit Summary   10/23/2017    Sha Vidal    MRN: [id number]           Patient Information     Date Of Birth          1970        Visit Information        Provider Department      10/23/2017 11:00 AM Neptali Fournier MD [facility]        Today's Diagnoses     Uterine leiomyoma, unspecified location    -  1    Abnormal uterine bleeding          Care Instructions    You can reach your [place] Care Team any time of the day by calling [phone]. This number will put you in touch with the 24 hour nurse line if the clinic is closed.    To contact your OB/GYN Station Coordinator/Surgery Scheduler please call [phone]. This is a direct number for your care team between 8 a.m. and 4 p.m. Monday through Friday.    [facility] is open for your convenience:  Monday through Friday 8 a.m. to 6 p.m.  Closed weekends and all major holidays.            Follow-ups after your visit        Your next 10 appointments already scheduled     Nov 03, 2017   Procedure with Neptali Fournier MD   [facility] (--)    [address] [facility] [address] [phone]            Nov 10, 2017  8:45 AM CST   MA SCREENING DIGITAL BILATERAL with [facility]   [facility]    [address] [facility] [address] [phone]           Do not use any powder, lotion or deodorant under your arms or on your breast. If you do, we will ask you to remove it before your exam.  Wear comfortable, two-piece clothing.  If you have any allergies, tell your care team.  Bring any previous mammograms from other facilities or have them mailed to the breast center.              Who to contact     If you have questions or need follow up information about today's clinic visit or your schedule please contact [facility] directly at [phone].  Normal or non-critical lab and imaging results will be communicated to you  by FileThis, letter or phone within 4 business days after the clinic has received the results. If you do not hear from us within 7 days, please contact the clinic through FileThis or phone. If you have a critical or abnormal lab result, we will notify you by phone as soon as possible.  Submit refill requests through FileThis or call your pharmacy and they will forward the refill request to us. Please allow 3 business days for your refill to be completed.          Additional Information About Your Visit        [employer] Information     FileThis gives you secure access to your electronic health record. If you see a primary care provider, you can also send messages to your care team and make appointments. If you have questions, please call your primary care clinic.  If you do not have a primary care provider, please call [phone] and they will assist you.        Care EveryWhere ID     This is your Care EveryWhere ID. This could be used by other organizations to access your [facility] medical records  [id number]        Your Vitals Were     Last Period BMI (Body Mass Index)                10/05/2017 (Exact Date) 30.73 kg/m2           Blood Pressure from Last 3 Encounters:   10/23/17 110/78   10/20/17 114/80   09/21/17 110/68    Weight from Last 3 Encounters:   10/23/17 168 lb (76.2 kg)   10/20/17 168 lb 8 oz (76.4 kg)   09/21/17 165 lb (74.8 kg)              Today, you had the following     No orders found for display       Primary Care Provider Office Phone # Fax #    Anita Quiros PA-C [phone]       [address]  [facility] [address]        Equal Access to Services     [facility]: Hadii aad ku hadasho Soomaali, waaxda luqadaha, qaybta kaalmada adeegmadhavi, morales donahue. So [facility] [phone].    ATENCIÓN: Si habla español, tiene a collins disposición servicios gratuitos de asistencia lingüística. Llame al [phone].    We comply with applicable federal civil  rights laws and [place] laws. We do not discriminate on the basis of race, color, national origin, age, disability, sex, sexual orientation, or gender identity.            Thank you!     Thank you for choosing [facility]  for your care. Our goal is always to provide you with excellent care. Hearing back from our patients is one way we can continue to improve our services. Please take a few minutes to complete the written survey that you may receive in the mail after your visit with us. Thank you!             Your Updated Medication List - Protect others around you: Learn how to safely use, store and throw away your medicines at www.disposemymeds.org.          This list is accurate as of: 10/23/17 11:59 PM.  Always use your most recent med list.                   Brand Name Dispense Instructions for use Diagnosis    cetirizine 10 MG tablet    zyrTEC     Take 10 mg by mouth daily        D 2000 2000 UNITS tablet   Generic drug:  cholecalciferol      Take 2,000 Units by mouth        FLUoxetine 10 MG capsule    PROzac    90 capsule    TAKE 1 CAPSULE BY MOUTH  DAILY DUE FOR AN ANNUAL  VISIT IN JULY. PLEASE CALL  CLINIC TO SCHEDULE.    Adjustment disorder with mixed anxiety and depressed mood       levothyroxine 25 MCG tablet    SYNTHROID/LEVOTHROID    90 tablet    Take 1 tablet (25 mcg) by mouth daily    Hypothyroidism, unspecified type       MULTI-VITAMINS Tabs      Take 1 tablet by mouth        phentermine 37.5 MG tablet    ADIPEX-P    31 tablet    Take 1 tablet (37.5 mg) by mouth every morning (before breakfast)    Hypothyroidism due to Hashimoto's thyroiditis, CARDIOVASCULAR SCREENING; LDL GOAL LESS THAN 160, Family history of diabetes mellitus

## 2017-10-27 NOTE — PROGRESS NOTES
Sha Vidal is a 47 year old female   Patient's last menstrual period was 2017. Tubal ligation for contraception, who presents for evaluation of long standing menorrhagia and a recently noted uterine fibroid found on CT for an unrelated cause. Pt endorses heavy menstrual cycles. Pt says her heavy periods have been happening for the last 10 years.  She reported her periods had gotten better with removal of a fibroid 1.5 yrs ago but, they are getting worse again. Pt endorses the first day or two of period is heavy. She explains her periods keep her from leaving the house at times. 1 super  Pad and tampon would take a couple of hours to fill. Pt explains using a tampon and pad when her period is heavy, and tries to stay close to home. Pt endorses her cycles are regular, every 30 days   As a part of her evaluation sonohysterogram and endometrial biopsy were performed which revealed the following    FINAL DIAGNOSIS:   Endometrium, biopsy-   -Benign proliferative endometrium with focal disordered proliferation   -Fragment compatible with polyp   -Negative for atypia or malignancy.      Ultrasound done 2017 showed the following     Measurements:  Uterus: 9.2 x 4.9 x 6.2cm.     Position is anteverted.  Contour is irreg w myomata:   1) Mid 4.3 x 3.6 x 4.0cm.      Endo cav: 4 mm         Distorted  Cervix: Wnl, Nabothian Cyst noted       Right ovary: 2.6 x 1.2 x 1.6cm.   Wnl  Left ovary:   2.2 x 1.1 x 1.5 cm.  Complex cyst 1.3 x 1.1 x 1.3cm      Cul de sac: no free fluid    Risks, benefits, and alternative modes of therapy discussed at length. Pathophysiology of the disease process reviewed, all of the patients questions answered and informed consent obtained.  At this point patient desires to proceed with hysteroscopy fractional D&C and resection of endometrial filling defects. She declines an endometrial ablation or extirpated surgery. She presents today for final preoperative visit. I again  reviewed these findings with the patient and after discussing the risks benefits and alternatives this is the plan that she desires.. Possible outcomes pre-and postop instructions reviewed at length    (D25.9) Uterine leiomyoma, unspecified location  (primary encounter diagnosis)  Comment: Myomas that affect the cavity the uterus  I believe that these are causing the heavy bleeding she's had. She had a past history of similar findings and after resection noticed improvement  Plan: we'll proceed with hysteroscopy fractional D&C and resection with myosure device    (N93.9) Abnormal uterine bleeding  Comment: as above  Plan: 15 min spent w > 50% in counseling

## 2017-11-02 NOTE — H&P (VIEW-ONLY)
[facility]  [address] [facility] [address] [phone]  Dept: [phone]    PRE-OP EVALUATION:  Today's date: 10/20/2017    Sha Vidal (: 1970) presents for pre-operative evaluation assessment as requested by Dr. Fournier.  She requires evaluation and anesthesia risk assessment prior to undergoing surgery/procedure for treatment of uterine fibroids.  Proposed procedure: COMBINED DILATION AND CURETTAGE, OPERATIVE HYSTEROSCOPY WITH MORCELLATOR    Date of Surgery/ Procedure: 11/3/17  Time of Surgery/ Procedure: 9:10 AM  Hospital/Surgical Facility: [facility]  Primary Physician: Anita Quiros  Type of Anesthesia Anticipated: to be determined    Patient has a Health Care Directive or Living Will:  NO    1. NO - Do you have a history of heart attack, stroke, stent, bypass or surgery on an artery in the head, neck, heart or legs?  2. NO - Do you ever have any pain or discomfort in your chest?  3. NO - Do you have a history of  Heart Failure?  4. NO - Are you troubled by shortness of breath when: walking on the level, up a slight hill or at night?  5. NO - Do you currently have a cold, bronchitis or other respiratory infection?  6. NO - Do you have a cough, shortness of breath or wheezing?  7. NO - Do you sometimes get pains in the calves of your legs when you walk?  8. NO - Do you or anyone in your family have previous history of blood clots?  9. NO - Do you or does anyone in your family have a serious bleeding problem such as prolonged bleeding following surgeries or cuts?  10. NO - Have you ever had problems with anemia or been told to take iron pills?  11. NO - Have you had any abnormal blood loss such as black, tarry or bloody stools, or abnormal vaginal bleeding?  12. NO - Have you ever had a blood transfusion?  13. NO - Have you or any of your relatives ever had problems with anesthesia?  14. NO - Do you have sleep apnea, excessive snoring or daytime  drowsiness?  15. NO - Do you have any prosthetic heart valves?  16. NO - Do you have prosthetic joints?  17. NO - Is there any chance that you may be pregnant?        HPI:                                                      Brief HPI related to upcoming procedure: Patient has history of heavy menses, she was found to have uterine fibroid, will be having D/C with removal of fibroid to hopefully help reduce heavy bleeding. LMP was 10/5/17.      HYPOTHYROIDISM - Patient has a longstanding history of chronic Hypothyroidism. Patient has been doing well, noting no tremor, insomnia, hair loss or changes in skin texture. Last TSH value of 2.93. Continues to take medications as directed, without adverse reactions or side effects.                                                                                                                                                                                                                        .    MEDICAL HISTORY:                                                    Patient Active Problem List    Diagnosis Date Noted     Pain in both feet 09/27/2017     Priority: Medium     Abnormal uterine bleeding 09/14/2017     Priority: Medium     Vitiligo 07/17/2017     Priority: Medium     Supraumbilical hernia 07/17/2017     Priority: Medium     Uterine leiomyoma, unspecified location 07/17/2017     Priority: Medium     Adjustment disorder with mixed anxiety and depressed mood 07/17/2017     Priority: Medium     Overweight 07/28/2015     Priority: Medium     Migraine headache 02/19/2013     Priority: Medium     CARDIOVASCULAR SCREENING; LDL GOAL LESS THAN 160 10/31/2010     Priority: Medium     Hypothyroidism 11/18/2009     Priority: Medium      Past Medical History:   Diagnosis Date     Allergic rhinitis, cause unspecified      Bleeding disorder (H)     factor 12     CARDIOVASCULAR SCREENING; LDL GOAL LESS THAN 160 10/31/2010     Hashimoto's thyroiditis      Lateral epicondylitis  3/13/2009     Migraine headache 2/19/2013     NONSPECIFIC MEDICAL HISTORY 2002    Factor XII deficiency (asymptomatic)     NONSPECIFIC MEDICAL HISTORY 2002    GRAYSON positive 1:320, seen by Rheum (negative PHUONG eval)     Past Surgical History:   Procedure Laterality Date     ABDOMEN SURGERY      10/2007     APPENDECTOMY      10/2007     C NONSPECIFIC PROCEDURE      wisdom teeth extraction     C NONSPECIFIC PROCEDURE      Bilateral tubal ligation     C NONSPECIFIC PROCEDURE  1/01    Lasik     C NONSPECIFIC PROCEDURE      explo lap for ARMANDO for SBO     DAVINCI HERNIORRHAPHY VENTRAL N/A 8/15/2017    Procedure: DAVINCI HERNIORRHAPHY VENTRAL;  Robotic assisted incisional and supra-umbilical  hernia repair with mesh;  Surgeon: Damon Hall MD;  Location: [facility] OR     Current Outpatient Prescriptions   Medication Sig Dispense Refill     FLUoxetine (PROZAC) 10 MG capsule TAKE 1 CAPSULE BY MOUTH  DAILY DUE FOR AN ANNUAL  VISIT IN JULY. PLEASE CALL  CLINIC TO SCHEDULE. 90 capsule 3     cetirizine (ZYRTEC) 10 MG tablet Take 10 mg by mouth daily       cholecalciferol (D 2000) 2000 UNITS tablet Take 2,000 Units by mouth       Multiple Vitamin (MULTI-VITAMINS) TABS Take 1 tablet by mouth       phentermine (ADIPEX-P) 37.5 MG tablet Take 1 tablet (37.5 mg) by mouth every morning (before breakfast) 31 tablet 2     levothyroxine (SYNTHROID/LEVOTHROID) 25 MCG tablet Take 1 tablet (25 mcg) by mouth daily 90 tablet 3     OTC products: None, except as noted above, no recent use of OTC ASA, NSAIDS or Steroids and no use of herbal medications or other supplements    Allergies   Allergen Reactions     No Known Drug Allergies       Latex Allergy: NO    Social History   Substance Use Topics     Smoking status: Never Smoker     Smokeless tobacco: Never Used     Alcohol use 0.0 oz/week     0 Standard drinks or equivalent per week      Comment: occasionally     History   Drug Use No       REVIEW OF SYSTEMS:                                    "                 C: NEGATIVE for fever, chills, change in weight  I: NEGATIVE for worrisome rashes, moles or lesions  E: NEGATIVE for vision changes or irritation  E/M: NEGATIVE for ear, mouth and throat problems  R: NEGATIVE for significant cough or SOB  B: NEGATIVE for masses, tenderness or discharge  CV: NEGATIVE for chest pain, palpitations or peripheral edema  GI: NEGATIVE for nausea, abdominal pain, heartburn, or change in bowel habits  : NEGATIVE for frequency, dysuria, or hematuria  M: NEGATIVE for significant arthralgias or myalgia  N: NEGATIVE for weakness, dizziness or paresthesias  E: NEGATIVE for temperature intolerance, skin/hair changes  H: NEGATIVE for bleeding problems  P: NEGATIVE for changes in mood or affect    EXAM:                                                    /80 (BP Location: Right arm, Patient Position: Chair, Cuff Size: Adult Regular)  Pulse 75  Temp 97.7  F (36.5  C) (Oral)  Resp 18  Ht 5' 2\" (1.575 m)  Wt 168 lb 8 oz (76.4 kg)  LMP 10/05/2017 (Exact Date)  SpO2 99%  Breastfeeding? No  BMI 30.82 kg/m2    GENERAL APPEARANCE: healthy, alert and no distress     EYES: EOMI, PERRL     HENT: ear canals and TM's normal and nose and mouth without ulcers or lesions     NECK: no adenopathy, no asymmetry, masses, or scars and thyroid normal to palpation     RESP: lungs clear to auscultation - no rales, rhonchi or wheezes     CV: regular rates and rhythm, normal S1 S2, no S3 or S4 and no murmur, click or rub     ABDOMEN:  soft, nontender, no HSM or masses and bowel sounds normal     MS: extremities normal- no gross deformities noted, no evidence of inflammation in joints, FROM in all extremities.     SKIN: no suspicious lesions or rashes     NEURO: Normal strength and tone, sensory exam grossly normal, mentation intact and speech normal     PSYCH: mentation appears normal. and affect normal/bright     LYMPHATICS: No axillary, cervical, or supraclavicular nodes    DIAGNOSTICS:     "                                                No labs or EKG required.    Recent Labs   Lab Test  08/03/17   1106  08/01/17   0803  07/17/17   0907  07/09/17   1925 04/28/17 08/04/16   0748   11/24/09   0641   HGB  11.8  11.9  11.7  11.3*   --    --    < >   --    PLT  354   --   340  320   --    --    < >   --    INR   --    --    --    --    --    --    --   1.02   NA   --    --   140  137   --    --    < >   --    POTASSIUM   --    --   4.4  3.7   --    --    < >   --    CR   --    --   0.83  0.90   --    --    < >   --    A1C   --    --    --    --   5.0  5.0   --    --     < > = values in this interval not displayed.        IMPRESSION:                                                    Reason for surgery/procedure: D/C, hysteroscopy with morcellator  Diagnosis/reason for consult: abnormal uterine bleeding, uterine leiomyoma    The proposed surgical procedure is considered INTERMEDIATE risk.    REVISED CARDIAC RISK INDEX  The patient has the following serious cardiovascular risks for perioperative complications such as (MI, PE, VFib and 3  AV Block):  No serious cardiac risks  INTERPRETATION: 0 risks: Class I (very low risk - 0.4% complication rate)    The patient has the following additional risks for perioperative complications:  No identified additional risks      ICD-10-CM    1. Preop general physical exam Z01.818    2. Abnormal uterine bleeding N93.9    3. Hypothyroidism, unspecified type E03.9    4. Visit for screening mammogram Z12.31 MA SCREENING DIGITAL BILAT - Future  (s+30)   5. Need for prophylactic vaccination and inoculation against influenza Z23 FLU VAC, SPLIT VIRUS IM > 3 YO (QUADRIVALENT) [89461]     Vaccine Administration, Initial [54151]       RECOMMENDATIONS:                                                      --Consult hospital rounder / IM to assist post-op medical management  --Patient is to skip all medication day of procedure except may take Synthroid with small sip of water am of  surgery.  --Patient understands NPO recommendations  --Patient advised to stop all NSAIDs, ASA and steroids 7-10 days prior to procedure.  --Patient understands where and when to be for surgery.    APPROVAL GIVEN to proceed with proposed procedure, without further diagnostic evaluation       Signed Electronically by: Anita Quiros PA-C    Copy of this evaluation report is provided to requesting physician.    [place] Preop Guidelines  Injectable Influenza Immunization Documentation    1.  Is the person to be vaccinated sick today?   No    2. Does the person to be vaccinated have an allergy to a component   of the vaccine?   No  Egg Allergy Algorithm Link    3. Has the person to be vaccinated ever had a serious reaction   to influenza vaccine in the past?   No    4. Has the person to be vaccinated ever had Guillain-Barré syndrome?   No    Form completed by Alexei Louise MA

## 2017-11-03 ENCOUNTER — ANESTHESIA (OUTPATIENT)
Dept: SURGERY | Facility: CLINIC | Age: 47
End: 2017-11-03
Payer: COMMERCIAL

## 2017-11-03 ENCOUNTER — HOSPITAL ENCOUNTER (OUTPATIENT)
Facility: CLINIC | Age: 47
Discharge: HOME | End: 2017-11-03
Attending: OBSTETRICS & GYNECOLOGY | Admitting: OBSTETRICS & GYNECOLOGY
Payer: COMMERCIAL

## 2017-11-03 ENCOUNTER — ANESTHESIA EVENT (OUTPATIENT)
Dept: SURGERY | Facility: CLINIC | Age: 47
End: 2017-11-03
Payer: COMMERCIAL

## 2017-11-03 ENCOUNTER — SURGERY (OUTPATIENT)
Age: 47
End: 2017-11-03

## 2017-11-03 VITALS
BODY MASS INDEX: 30.36 KG/M2 | OXYGEN SATURATION: 98 % | SYSTOLIC BLOOD PRESSURE: 100 MMHG | HEIGHT: 62 IN | TEMPERATURE: 97.3 F | WEIGHT: 165 LBS | RESPIRATION RATE: 16 BRPM | DIASTOLIC BLOOD PRESSURE: 66 MMHG

## 2017-11-03 DIAGNOSIS — Z98.890 POST-OPERATIVE STATE: Primary | ICD-10-CM

## 2017-11-03 LAB — HCG SERPL QL: NEGATIVE

## 2017-11-03 PROCEDURE — 58558 HYSTEROSCOPY BIOPSY: CPT | Performed by: OBSTETRICS & GYNECOLOGY

## 2017-11-03 PROCEDURE — 25000128 H RX IP 250 OP 636: Performed by: OBSTETRICS & GYNECOLOGY

## 2017-11-03 PROCEDURE — 25000128 H RX IP 250 OP 636: Performed by: ANESTHESIOLOGY

## 2017-11-03 PROCEDURE — 25000125 ZZHC RX 250: Performed by: NURSE ANESTHETIST, CERTIFIED REGISTERED

## 2017-11-03 PROCEDURE — 37000009 ZZH ANESTHESIA TECHNICAL FEE, EACH ADDTL 15 MIN: Performed by: OBSTETRICS & GYNECOLOGY

## 2017-11-03 PROCEDURE — 71000012 ZZH RECOVERY PHASE 1 LEVEL 1 FIRST HR: Performed by: OBSTETRICS & GYNECOLOGY

## 2017-11-03 PROCEDURE — 36415 COLL VENOUS BLD VENIPUNCTURE: CPT | Performed by: OBSTETRICS & GYNECOLOGY

## 2017-11-03 PROCEDURE — 71000027 ZZH RECOVERY PHASE 2 EACH 15 MINS: Performed by: OBSTETRICS & GYNECOLOGY

## 2017-11-03 PROCEDURE — 84703 CHORIONIC GONADOTROPIN ASSAY: CPT | Performed by: OBSTETRICS & GYNECOLOGY

## 2017-11-03 PROCEDURE — 88305 TISSUE EXAM BY PATHOLOGIST: CPT | Mod: 26 | Performed by: OBSTETRICS & GYNECOLOGY

## 2017-11-03 PROCEDURE — 40000306 ZZH STATISTIC PRE PROC ASSESS II: Performed by: OBSTETRICS & GYNECOLOGY

## 2017-11-03 PROCEDURE — 25000128 H RX IP 250 OP 636: Performed by: NURSE ANESTHETIST, CERTIFIED REGISTERED

## 2017-11-03 PROCEDURE — 37000008 ZZH ANESTHESIA TECHNICAL FEE, 1ST 30 MIN: Performed by: OBSTETRICS & GYNECOLOGY

## 2017-11-03 PROCEDURE — 44180 LAP ENTEROLYSIS: CPT | Performed by: SURGERY

## 2017-11-03 PROCEDURE — 36000058 ZZH SURGERY LEVEL 3 EA 15 ADDTL MIN: Performed by: OBSTETRICS & GYNECOLOGY

## 2017-11-03 PROCEDURE — 36000056 ZZH SURGERY LEVEL 3 1ST 30 MIN: Performed by: OBSTETRICS & GYNECOLOGY

## 2017-11-03 PROCEDURE — 25800025 ZZH RX 258: Performed by: OBSTETRICS & GYNECOLOGY

## 2017-11-03 PROCEDURE — 25000132 ZZH RX MED GY IP 250 OP 250 PS 637: Performed by: OBSTETRICS & GYNECOLOGY

## 2017-11-03 PROCEDURE — 27210794 ZZH OR GENERAL SUPPLY STERILE: Performed by: OBSTETRICS & GYNECOLOGY

## 2017-11-03 PROCEDURE — 88305 TISSUE EXAM BY PATHOLOGIST: CPT | Performed by: OBSTETRICS & GYNECOLOGY

## 2017-11-03 PROCEDURE — 71000013 ZZH RECOVERY PHASE 1 LEVEL 1 EA ADDTL HR: Performed by: OBSTETRICS & GYNECOLOGY

## 2017-11-03 PROCEDURE — 25000566 ZZH SEVOFLURANE, EA 15 MIN: Performed by: OBSTETRICS & GYNECOLOGY

## 2017-11-03 RX ORDER — MEPERIDINE HYDROCHLORIDE 25 MG/ML
12.5 INJECTION INTRAMUSCULAR; INTRAVENOUS; SUBCUTANEOUS
Status: DISCONTINUED | OUTPATIENT
Start: 2017-11-03 | End: 2017-11-03 | Stop reason: HOSPADM

## 2017-11-03 RX ORDER — SODIUM CHLORIDE, SODIUM LACTATE, POTASSIUM CHLORIDE, CALCIUM CHLORIDE 600; 310; 30; 20 MG/100ML; MG/100ML; MG/100ML; MG/100ML
INJECTION, SOLUTION INTRAVENOUS CONTINUOUS
Status: DISCONTINUED | OUTPATIENT
Start: 2017-11-03 | End: 2017-11-03 | Stop reason: HOSPADM

## 2017-11-03 RX ORDER — ONDANSETRON 4 MG/1
4 TABLET, ORALLY DISINTEGRATING ORAL EVERY 30 MIN PRN
Status: DISCONTINUED | OUTPATIENT
Start: 2017-11-03 | End: 2017-11-03 | Stop reason: HOSPADM

## 2017-11-03 RX ORDER — ONDANSETRON 2 MG/ML
INJECTION INTRAMUSCULAR; INTRAVENOUS PRN
Status: DISCONTINUED | OUTPATIENT
Start: 2017-11-03 | End: 2017-11-03

## 2017-11-03 RX ORDER — CEFAZOLIN SODIUM 1 G/3ML
1 INJECTION, POWDER, FOR SOLUTION INTRAMUSCULAR; INTRAVENOUS SEE ADMIN INSTRUCTIONS
Status: DISCONTINUED | OUTPATIENT
Start: 2017-11-03 | End: 2017-11-03 | Stop reason: HOSPADM

## 2017-11-03 RX ORDER — IBUPROFEN 600 MG/1
600 TABLET, FILM COATED ORAL
Status: DISCONTINUED | OUTPATIENT
Start: 2017-11-03 | End: 2017-11-03 | Stop reason: HOSPADM

## 2017-11-03 RX ORDER — CEFAZOLIN SODIUM 2 G/100ML
2 INJECTION, SOLUTION INTRAVENOUS
Status: COMPLETED | OUTPATIENT
Start: 2017-11-03 | End: 2017-11-03

## 2017-11-03 RX ORDER — ACETAMINOPHEN 325 MG/1
650 TABLET ORAL EVERY 4 HOURS PRN
Qty: 100 TABLET | Refills: 0 | COMMUNITY
Start: 2017-11-03 | End: 2022-04-22

## 2017-11-03 RX ORDER — HYDROMORPHONE HYDROCHLORIDE 1 MG/ML
.3-.5 INJECTION, SOLUTION INTRAMUSCULAR; INTRAVENOUS; SUBCUTANEOUS EVERY 10 MIN PRN
Status: DISCONTINUED | OUTPATIENT
Start: 2017-11-03 | End: 2017-11-03 | Stop reason: HOSPADM

## 2017-11-03 RX ORDER — PROPOFOL 10 MG/ML
INJECTION, EMULSION INTRAVENOUS PRN
Status: DISCONTINUED | OUTPATIENT
Start: 2017-11-03 | End: 2017-11-03

## 2017-11-03 RX ORDER — NEOSTIGMINE METHYLSULFATE 1 MG/ML
VIAL (ML) INJECTION PRN
Status: DISCONTINUED | OUTPATIENT
Start: 2017-11-03 | End: 2017-11-03

## 2017-11-03 RX ORDER — GLYCOPYRROLATE 0.2 MG/ML
INJECTION, SOLUTION INTRAMUSCULAR; INTRAVENOUS PRN
Status: DISCONTINUED | OUTPATIENT
Start: 2017-11-03 | End: 2017-11-03

## 2017-11-03 RX ORDER — IBUPROFEN 600 MG/1
600 TABLET, FILM COATED ORAL EVERY 6 HOURS PRN
Qty: 30 TABLET | Refills: 0 | COMMUNITY
Start: 2017-11-03 | End: 2022-04-22

## 2017-11-03 RX ORDER — FENTANYL CITRATE 50 UG/ML
25-50 INJECTION, SOLUTION INTRAMUSCULAR; INTRAVENOUS
Status: DISCONTINUED | OUTPATIENT
Start: 2017-11-03 | End: 2017-11-03 | Stop reason: HOSPADM

## 2017-11-03 RX ORDER — EPHEDRINE SULFATE 50 MG/ML
INJECTION, SOLUTION INTRAMUSCULAR; INTRAVENOUS; SUBCUTANEOUS PRN
Status: DISCONTINUED | OUTPATIENT
Start: 2017-11-03 | End: 2017-11-03

## 2017-11-03 RX ORDER — ONDANSETRON 2 MG/ML
4 INJECTION INTRAMUSCULAR; INTRAVENOUS EVERY 30 MIN PRN
Status: DISCONTINUED | OUTPATIENT
Start: 2017-11-03 | End: 2017-11-03 | Stop reason: HOSPADM

## 2017-11-03 RX ORDER — DEXAMETHASONE SODIUM PHOSPHATE 4 MG/ML
INJECTION, SOLUTION INTRA-ARTICULAR; INTRALESIONAL; INTRAMUSCULAR; INTRAVENOUS; SOFT TISSUE PRN
Status: DISCONTINUED | OUTPATIENT
Start: 2017-11-03 | End: 2017-11-03

## 2017-11-03 RX ORDER — PHENAZOPYRIDINE HYDROCHLORIDE 200 MG/1
200 TABLET, FILM COATED ORAL ONCE
Status: COMPLETED | OUTPATIENT
Start: 2017-11-03 | End: 2017-11-03

## 2017-11-03 RX ORDER — LIDOCAINE HYDROCHLORIDE 10 MG/ML
INJECTION, SOLUTION INFILTRATION; PERINEURAL PRN
Status: DISCONTINUED | OUTPATIENT
Start: 2017-11-03 | End: 2017-11-03

## 2017-11-03 RX ORDER — FENTANYL CITRATE 50 UG/ML
INJECTION, SOLUTION INTRAMUSCULAR; INTRAVENOUS PRN
Status: DISCONTINUED | OUTPATIENT
Start: 2017-11-03 | End: 2017-11-03

## 2017-11-03 RX ORDER — NALOXONE HYDROCHLORIDE 0.4 MG/ML
.1-.4 INJECTION, SOLUTION INTRAMUSCULAR; INTRAVENOUS; SUBCUTANEOUS
Status: DISCONTINUED | OUTPATIENT
Start: 2017-11-03 | End: 2017-11-03 | Stop reason: HOSPADM

## 2017-11-03 RX ORDER — OXYCODONE HYDROCHLORIDE 5 MG/1
5-10 TABLET ORAL
Status: COMPLETED | OUTPATIENT
Start: 2017-11-03 | End: 2017-11-03

## 2017-11-03 RX ORDER — LIDOCAINE 40 MG/G
CREAM TOPICAL
Status: DISCONTINUED | OUTPATIENT
Start: 2017-11-03 | End: 2017-11-03 | Stop reason: HOSPADM

## 2017-11-03 RX ORDER — LABETALOL HYDROCHLORIDE 5 MG/ML
10 INJECTION, SOLUTION INTRAVENOUS EVERY 5 MIN PRN
Status: DISCONTINUED | OUTPATIENT
Start: 2017-11-03 | End: 2017-11-03 | Stop reason: HOSPADM

## 2017-11-03 RX ORDER — OXYCODONE HYDROCHLORIDE 5 MG/1
5-10 TABLET ORAL
Qty: 10 TABLET | Refills: 0 | Status: SHIPPED | OUTPATIENT
Start: 2017-11-03 | End: 2018-10-23

## 2017-11-03 RX ADMIN — CEFAZOLIN SODIUM 2 G: 2 INJECTION, SOLUTION INTRAVENOUS at 10:13

## 2017-11-03 RX ADMIN — LIDOCAINE HYDROCHLORIDE 50 MG: 10 INJECTION, SOLUTION INFILTRATION; PERINEURAL at 10:06

## 2017-11-03 RX ADMIN — FENTANYL CITRATE 50 MCG: 50 INJECTION, SOLUTION INTRAMUSCULAR; INTRAVENOUS at 11:25

## 2017-11-03 RX ADMIN — FENTANYL CITRATE 50 MCG: 50 INJECTION INTRAMUSCULAR; INTRAVENOUS at 12:05

## 2017-11-03 RX ADMIN — SODIUM CHLORIDE 2358 ML: 900 IRRIGANT IRRIGATION at 11:18

## 2017-11-03 RX ADMIN — FENTANYL CITRATE 50 MCG: 50 INJECTION, SOLUTION INTRAMUSCULAR; INTRAVENOUS at 10:57

## 2017-11-03 RX ADMIN — MIDAZOLAM HYDROCHLORIDE 2 MG: 1 INJECTION, SOLUTION INTRAMUSCULAR; INTRAVENOUS at 10:03

## 2017-11-03 RX ADMIN — SODIUM CHLORIDE, POTASSIUM CHLORIDE, SODIUM LACTATE AND CALCIUM CHLORIDE: 600; 310; 30; 20 INJECTION, SOLUTION INTRAVENOUS at 10:36

## 2017-11-03 RX ADMIN — SODIUM CHLORIDE, POTASSIUM CHLORIDE, SODIUM LACTATE AND CALCIUM CHLORIDE: 600; 310; 30; 20 INJECTION, SOLUTION INTRAVENOUS at 10:03

## 2017-11-03 RX ADMIN — Medication 3 MG: at 11:20

## 2017-11-03 RX ADMIN — FENTANYL CITRATE 100 MCG: 50 INJECTION, SOLUTION INTRAMUSCULAR; INTRAVENOUS at 10:06

## 2017-11-03 RX ADMIN — PHENAZOPYRIDINE HYDROCHLORIDE 200 MG: 200 TABLET, COATED ORAL at 08:27

## 2017-11-03 RX ADMIN — Medication 5 MG: at 11:10

## 2017-11-03 RX ADMIN — DEXAMETHASONE SODIUM PHOSPHATE 4 MG: 4 INJECTION, SOLUTION INTRA-ARTICULAR; INTRALESIONAL; INTRAMUSCULAR; INTRAVENOUS; SOFT TISSUE at 10:07

## 2017-11-03 RX ADMIN — PROPOFOL 160 MG: 10 INJECTION, EMULSION INTRAVENOUS at 10:06

## 2017-11-03 RX ADMIN — ROCURONIUM BROMIDE 25 MG: 10 INJECTION INTRAVENOUS at 10:43

## 2017-11-03 RX ADMIN — GLYCOPYRROLATE 0.1 MG: 0.2 INJECTION, SOLUTION INTRAMUSCULAR; INTRAVENOUS at 11:10

## 2017-11-03 RX ADMIN — ONDANSETRON 4 MG: 2 INJECTION INTRAMUSCULAR; INTRAVENOUS at 11:20

## 2017-11-03 RX ADMIN — Medication 5 MG: at 11:13

## 2017-11-03 RX ADMIN — GLYCOPYRROLATE 0.4 MG: 0.2 INJECTION, SOLUTION INTRAMUSCULAR; INTRAVENOUS at 11:20

## 2017-11-03 RX ADMIN — OXYCODONE HYDROCHLORIDE 5 MG: 5 TABLET ORAL at 12:39

## 2017-11-03 RX ADMIN — FENTANYL CITRATE 50 MCG: 50 INJECTION INTRAMUSCULAR; INTRAVENOUS at 11:57

## 2017-11-03 NOTE — DISCHARGE INSTRUCTIONS
LAPAROSCOPY, HYSTEROSCOPY OR PELVISCOPY DISCHARGE INSTRUCTIONS    DO NOT DRIVE A CAR, DRINK ALCOHOL OR USE MACHINERY FOR THE NEXT 24 HOURS.  YOU SHOULD WAIT UNTIL YOU HAVE RECOVERED BEFORE MAKING ANY IMPORTANT DECISIONS.    PAIN  YOU MAY HAVE CRAMPS, SHOULDER PAIN OR A LOW BACKACHE FOR 24 TO 48 HOURS.  TYLENOL (ACETAMINOPHEN) OR MOTRIN (IBUPROFEN) MAY HELP, OR YOUR DOCTOR MAY GIVE YOU PAIN MEDICINE.  CALL YOUR DOCTOR IF PAIN CANNOT BE CONTROLLED.    BLEEDING OR VAGINAL DISCHARGE  YOU MAY HAVE SOME BLEEDING OR DISCHARGE FOR UP TO A WEEK OR LONGER.  DO NOT DOUCHE, USE TAMPONS OR HAVE SEX (INTERCOURSE) FOR ______DAYS.  CALL YOUR DOCTOR IF YOU SOAK MORE THAN ONE MAXI PAD (SANITARY NAPKIN) PER HOUR, OR IF YOU PASS LARGE BLOOD CLOTS.    FEVER  YOU MAY HAVE A LOW FEVER FOR THE FIRST TWO DAYS.  CALL YOUR DOCTOR IF IT GOES OVER 101 DEGREES.    NAUSEA  IF YOU HAVE NAUSEA (FEEL SICK TO YOUR STOMACH), STAY IN BED.  TRY DRINKING A SMALL AMOUNT OF 7-UP, TEA OR SOUP.    SWOLLEN BELLY  IF YOUR ABDOMEN (BELLY AREA) FEELS FIRM OR SWOLLEN, CALL YOUR DOCTOR.    DIZZINESS AND WEAKNESS  YOU MAY FEEL DIZZY OR WEAK FOR A FEW DAYS.  IF SO, YOU SHOULD REST OFTEN, STAND UP SLOWLY AND USE CARE WHEN CLIMBING STAIRS.    DIET AND ACTIVITY  EAT LIGHT MEALS AND DRINK PLENTY OF FLUIDS FOR THE FIRST 24 HOURS (OR LONGER, IF YOU HAVE NAUSEA).  WAIT 5 DAYS BEFORE BATHING.  SHOWERS ARE OKAY.  MOST WOMEN CAN RETURN TO WORK AFTER 24 HOURS.  YOU MAY GO BACK TO YOUR OTHER ACTIVITIES AFTER YOUR PAIN GOES AWAY.            IF YOU HAVE STITCHES  YOU MAY REMOVE YOUR BANDAGE THE DAY AFTER TREATMENT.  YOUR DOCTOR WILL TELL YOU IF YOUR STITCHES NEED TO BE REMOVED.  SOME STITCHES DISSOLVE OVER TIME.         GENERAL ANESTHESIA OR SEDATION ADULT DISCHARGE INSTRUCTIONS   SPECIAL PRECAUTIONS FOR 24 HOURS AFTER SURGERY    IT IS NOT UNUSUAL TO FEEL LIGHT-HEADED OR FAINT, UP TO 24 HOURS AFTER SURGERY OR WHILE TAKING PAIN MEDICATION.  IF YOU HAVE THESE SYMPTOMS; SIT FOR A  FEW MINUTES BEFORE STANDING AND HAVE SOMEONE ASSIST YOU WHEN YOU GET UP TO WALK OR USE THE BATHROOM.    YOU SHOULD REST AND RELAX FOR THE NEXT 24 HOURS AND YOU MUST MAKE ARRANGEMENTS TO HAVE SOMEONE STAY WITH YOU FOR AT LEAST 24 HOURS AFTER YOUR DISCHARGE.  AVOID HAZARDOUS AND STRENUOUS ACTIVITIES.  DO NOT MAKE IMPORTANT DECISIONS FOR 24 HOURS.    DO NOT DRIVE ANY VEHICLE OR OPERATE MECHANICAL EQUIPMENT FOR 24 HOURS FOLLOWING THE END OF YOUR SURGERY.  EVEN THOUGH YOU MAY FEEL NORMAL, YOUR REACTIONS MAY BE AFFECTED BY THE MEDICATION YOU HAVE RECEIVED.    DO NOT DRINK ALCOHOLIC BEVERAGES FOR 24 HOURS FOLLOWING YOUR SURGERY.    DRINK CLEAR LIQUIDS (APPLE JUICE, GINGER ALE, 7-UP, BROTH, ETC.).  PROGRESS TO YOUR REGULAR DIET AS YOU FEEL ABLE.    YOU MAY HAVE A DRY MOUTH, A SORE THROAT, MUSCLES ACHES OR TROUBLE SLEEPING.  THESE SHOULD GO AWAY AFTER 24 HOURS.    CALL YOUR DOCTOR FOR ANY OF THE FOLLOWING:  SIGNS OF INFECTION (FEVER, GROWING TENDERNESS AT THE SURGERY SITE, A LARGE AMOUNT OF DRAINAGE OR BLEEDING, SEVERE PAIN, FOUL-SMELLING DRAINAGE, REDNESS OR SWELLING.    IT HAS BEEN OVER 8 TO 10 HOURS SINCE SURGERY AND YOU ARE STILL NOT ABLE TO URINATE (PASS WATER).     You had a medication today called Pyridium which will temporarily change the color of your urine.

## 2017-11-03 NOTE — ANESTHESIA POSTPROCEDURE EVALUATION
Patient: Sha Vidal    Procedure(s):  Exam Under Anestheisa, Diagnosistic Hysteroscopy,  diagnostic laparoscopy and lysis of adhesions - Wound Class: II-Clean Contaminated  diagnostic laparoscopy, lysis of adhesions - Wound Class: II-Clean Contaminated    Diagnosis:Dysfunctional Uterine Bleeding With Fibroid   Diagnosis Additional Information: menorrhagia; abnormal uterine bleeding unresponsive to conservative therapies, enlarged fibroid uterus, abnormal uterine cavity, intraabdominal adhesions       Anesthesia Type:  General, LMA    Note:  Anesthesia Post Evaluation    Patient location during evaluation: PACU  Patient participation: Able to fully participate in evaluation  Level of consciousness: awake and alert  Pain management: adequate  Airway patency: patent  Cardiovascular status: acceptable  Respiratory status: acceptable  Hydration status: acceptable  PONV: none     Anesthetic complications: None          Last vitals:  Vitals:    11/03/17 0803 11/03/17 1138   BP: 114/75    Resp: 16    Temp: 97.3  F (36.3  C) 97.3  F (36.3  C)   SpO2: 98% 99%         Electronically Signed By: Rudy Rodriguez MD  November 3, 2017  11:57 AM

## 2017-11-03 NOTE — IP AVS SNAPSHOT
MRN:[id number]                      After Visit Summary   11/3/2017    Sha Vidal    MRN: [id number]           Thank you!     Thank you for choosing [facility] for your care. Our goal is always to provide you with excellent care. Hearing back from our patients is one way we can continue to improve our services. Please take a few minutes to complete the written survey that you may receive in the mail after you visit. If you would like to speak to someone directly about your visit please contact Patient Relations at [phone]. Thank you!          Patient Information     Date Of Birth          1970        About your hospital stay     You were admitted on:  November 3, 2017 You last received care in the:  [facility] PreOP/PostOP    You were discharged on:  November 3, 2017       Who to Call     For medical emergencies, please call 251.  For non-urgent questions about your medical care, please call your primary care provider or clinic, [phone]  For questions related to your surgery, please call your surgery clinic        Attending Provider     Provider Specialty    Neptali Fournier MD OB/Gyn       Primary Care Provider Office Phone # Fax #    Anita Quiros PA-C [phone]      After Care Instructions     Discharge Instructions       Resume pre procedure diet            Discharge Instructions       Pelvic Rest. No tampons, douching or intercourse for  2  Weeks until vaginal drainage resolved.            Discharge Instructions       Patient to arrange follow up appointment in 7-14  days            Dressing       Keep dressing clean and dry, change as instructed by Provider or RN            Ice to affected area       PRN as tolerated            No alcohol       NO ALCOHOL for 24 hours post procedure            No lifting       No lifting over 10 pounds and no strenuous physical activity.  For 2 weeks  Nothing in vagina until drainage resolved.   Take temp daily for 3 days and call if > 100.4 or if having pain not relieved with meds            Shower        Shower on Post-op day  1.   DO NOT take a bath until vaginal drainage resolved                  Your next 10 appointments already scheduled     Nov 10, 2017  8:45 AM CST   [facility] SCREENING [facility]   [facility]    [address] [address] [phone]           Do not use any powder, lotion or deodorant under your arms or on your breast. If you do, we will ask you to remove it before your exam.  Wear comfortable, two-piece clothing.  If you have any allergies, tell your care team.  Bring any previous mammograms from other facilities or have them mailed to the breast center.              Further instructions from your care team       LAPAROSCOPY, HYSTEROSCOPY OR PELVISCOPY DISCHARGE INSTRUCTIONS    DO NOT DRIVE A CAR, DRINK ALCOHOL OR USE MACHINERY FOR THE NEXT 24 HOURS.  YOU SHOULD WAIT UNTIL YOU HAVE RECOVERED BEFORE MAKING ANY IMPORTANT DECISIONS.    PAIN  YOU MAY HAVE CRAMPS, SHOULDER PAIN OR A LOW BACKACHE FOR 24 TO 48 HOURS.  TYLENOL (ACETAMINOPHEN) OR MOTRIN (IBUPROFEN) MAY HELP, OR YOUR DOCTOR MAY GIVE YOU PAIN MEDICINE.  CALL YOUR DOCTOR IF PAIN CANNOT BE CONTROLLED.    BLEEDING OR VAGINAL DISCHARGE  YOU MAY HAVE SOME BLEEDING OR DISCHARGE FOR UP TO A WEEK OR LONGER.  DO NOT DOUCHE, USE TAMPONS OR HAVE SEX (INTERCOURSE) FOR ______DAYS.  CALL YOUR DOCTOR IF YOU SOAK MORE THAN ONE MAXI PAD (SANITARY [place]) PER HOUR, OR IF YOU PASS LARGE BLOOD CLOTS.    FEVER  YOU MAY HAVE A LOW FEVER FOR THE FIRST TWO DAYS.  CALL YOUR DOCTOR IF IT GOES OVER 101 DEGREES.    NAUSEA  IF YOU HAVE NAUSEA (FEEL SICK TO YOUR STOMACH), STAY IN BED.  TRY DRINKING A SMALL AMOUNT OF 7-UP, TEA OR SOUP.    SWOLLEN BELLY  IF YOUR ABDOMEN (BELLY AREA) FEELS FIRM OR SWOLLEN, CALL YOUR DOCTOR.    DIZZINESS AND WEAKNESS  YOU MAY FEEL DIZZY OR WEAK  FOR A FEW DAYS.  IF SO, YOU SHOULD REST OFTEN, STAND UP SLOWLY AND USE CARE WHEN CLIMBING STAIRS.    DIET AND ACTIVITY  EAT LIGHT MEALS AND DRINK PLENTY OF FLUIDS FOR THE FIRST 24 HOURS (OR LONGER, IF YOU HAVE NAUSEA).  WAIT 5 DAYS BEFORE BATHING.  SHOWERS ARE OKAY.  MOST WOMEN CAN RETURN TO WORK AFTER 24 HOURS.  YOU MAY GO BACK TO YOUR OTHER ACTIVITIES AFTER YOUR PAIN GOES AWAY.            IF YOU HAVE STITCHES  YOU MAY REMOVE YOUR BANDAGE THE DAY AFTER TREATMENT.  YOUR DOCTOR WILL TELL YOU IF YOUR STITCHES NEED TO BE REMOVED.  SOME STITCHES DISSOLVE OVER TIME.         GENERAL ANESTHESIA OR SEDATION ADULT DISCHARGE INSTRUCTIONS   SPECIAL PRECAUTIONS FOR 24 HOURS AFTER SURGERY    IT IS NOT UNUSUAL TO FEEL LIGHT-HEADED OR FAINT, UP TO 24 HOURS AFTER SURGERY OR WHILE TAKING PAIN MEDICATION.  IF YOU HAVE THESE SYMPTOMS; SIT FOR A FEW MINUTES BEFORE STANDING AND HAVE SOMEONE ASSIST YOU WHEN YOU GET UP TO WALK OR USE THE BATHROOM.    YOU SHOULD REST AND RELAX FOR THE NEXT 24 HOURS AND YOU MUST MAKE ARRANGEMENTS TO HAVE SOMEONE STAY WITH YOU FOR AT LEAST 24 HOURS AFTER YOUR DISCHARGE.  AVOID HAZARDOUS AND STRENUOUS ACTIVITIES.  DO NOT MAKE IMPORTANT DECISIONS FOR 24 HOURS.    DO NOT DRIVE ANY VEHICLE OR OPERATE MECHANICAL EQUIPMENT FOR 24 HOURS FOLLOWING THE END OF YOUR SURGERY.  EVEN THOUGH YOU MAY FEEL NORMAL, YOUR REACTIONS MAY BE AFFECTED BY THE MEDICATION YOU HAVE RECEIVED.    DO NOT DRINK ALCOHOLIC BEVERAGES FOR 24 HOURS FOLLOWING YOUR SURGERY.    DRINK CLEAR LIQUIDS (APPLE JUICE, GINGER ALE, 7-UP, BROTH, ETC.).  PROGRESS TO YOUR REGULAR DIET AS YOU FEEL ABLE.    YOU MAY HAVE A DRY MOUTH, A SORE THROAT, MUSCLES ACHES OR TROUBLE SLEEPING.  THESE SHOULD GO AWAY AFTER 24 HOURS.    CALL YOUR DOCTOR FOR ANY OF THE FOLLOWING:  SIGNS OF INFECTION (FEVER, GROWING TENDERNESS AT THE SURGERY SITE, A LARGE AMOUNT OF DRAINAGE OR BLEEDING, SEVERE PAIN, FOUL-SMELLING DRAINAGE, REDNESS OR SWELLING.    IT HAS BEEN OVER 8 TO 10 HOURS  "SINCE SURGERY AND YOU ARE STILL NOT ABLE TO URINATE (PASS WATER).     You had a medication today called Pyridium which will temporarily change the color of your urine.       Pending Results     Date and Time Order Name Status Description    11/3/2017 1147 Surgical pathology exam In process             Statement of Approval     Ordered          11/03/17 1138  I have reviewed and agree with all the recommendations and orders detailed in this document.  EFFECTIVE NOW     Approved and electronically signed by:  Neptali Fournier MD             Admission Information     Date & Time Provider Department Dept. Phone    11/3/2017 Neptali Fournier MD [facility] PreOP/PostOP [phone]      Your Vitals Were     Blood Pressure Temperature Respirations Height Weight Last Period    109/64 97.3  F (36.3  C) (Temporal) 16 1.575 m (5' 2\") 74.8 kg (165 lb) 10/05/2017 (Exact Date)    Pulse Oximetry BMI (Body Mass Index)                97% 30.18 kg/m2          [facility] Information     [facility] gives you secure access to your electronic health record. If you see a primary care provider, you can also send messages to your care team and make appointments. If you have questions, please call your primary care clinic.  If you do not have a primary care provider, please call [phone] and they will assist you.        Care EveryWhere ID     This is your Care EveryWhere ID. This could be used by other organizations to access your [place] medical records  [id number]        Equal Access to Services     ROBSON CONLEY : Hadii melodie Dow, waaxda luqadaha, qaybta kaalmada bev, morales bland . So [facility] [phone].    ATENCIÓN: Si habla español, tiene a collins disposición servicios gratuitos de asistencia lingüística. Llame al [phone].    We comply with applicable federal civil rights laws and [place] laws. We do not discriminate on the basis of race, color, national origin, age, disability, sex, " sexual orientation, or gender identity.               Review of your medicines      START taking        Dose / Directions    acetaminophen 325 MG tablet   Commonly known as:  TYLENOL   Used for:  Post-operative state        Dose:  650 mg   Take 2 tablets (650 mg) by mouth every 4 hours as needed for other (mild pain)   Quantity:  100 tablet   Refills:  0       ibuprofen 600 MG tablet   Commonly known as:  ADVIL/MOTRIN   Used for:  Post-operative state        Dose:  600 mg   Take 1 tablet (600 mg) by mouth every 6 hours as needed for pain (mild)   Quantity:  30 tablet   Refills:  0       oxyCODONE IR 5 MG tablet   Commonly known as:  ROXICODONE   Used for:  Post-operative state        Dose:  5-10 mg   Take 1-2 tablets (5-10 mg) by mouth every 3 hours as needed for pain or other (Moderate to Severe)   Quantity:  10 tablet   Refills:  0         CONTINUE these medicines which have NOT CHANGED        Dose / Directions    cetirizine 10 MG tablet   Commonly known as:  zyrTEC        Dose:  10 mg   Take 10 mg by mouth daily   Refills:  0       D 2000 2000 UNITS tablet   Generic drug:  cholecalciferol        Dose:  2000 Units   Take 2,000 Units by mouth   Refills:  0       FLUoxetine 10 MG capsule   Commonly known as:  PROzac   Used for:  Adjustment disorder with mixed anxiety and depressed mood        TAKE 1 CAPSULE BY MOUTH  DAILY DUE FOR AN ANNUAL  VISIT IN JULY. PLEASE CALL  CLINIC TO SCHEDULE.   Quantity:  90 capsule   Refills:  3       levothyroxine 25 MCG tablet   Commonly known as:  SYNTHROID/LEVOTHROID   Used for:  Hypothyroidism, unspecified type        Dose:  25 mcg   Take 1 tablet (25 mcg) by mouth daily   Quantity:  90 tablet   Refills:  3       MULTI-VITAMINS Tabs        Dose:  1 tablet   Take 1 tablet by mouth   Refills:  0         STOP taking     phentermine 37.5 MG tablet   Commonly known as:  ADIPEX-P                Where to get your medicines      Some of these will need a paper prescription and others can  be bought over the counter. Ask your nurse if you have questions.     Bring a paper prescription for each of these medications     oxyCODONE IR 5 MG tablet       You don't need a prescription for these medications     acetaminophen 325 MG tablet    ibuprofen 600 MG tablet                Protect others around you: Learn how to safely use, store and throw away your medicines at www.disposemymeds.org.             Medication List: This is a list of all your medications and when to take them. Check marks below indicate your daily home schedule. Keep this list as a reference.      Medications           Morning Afternoon Evening Bedtime As Needed    acetaminophen 325 MG tablet   Commonly known as:  TYLENOL   Take 2 tablets (650 mg) by mouth every 4 hours as needed for other (mild pain)                                cetirizine 10 MG tablet   Commonly known as:  zyrTEC   Take 10 mg by mouth daily                                D 2000 2000 UNITS tablet   Take 2,000 Units by mouth   Generic drug:  cholecalciferol                                FLUoxetine 10 MG capsule   Commonly known as:  PROzac   TAKE 1 CAPSULE BY MOUTH  DAILY DUE FOR AN ANNUAL  VISIT IN JULY. PLEASE CALL  CLINIC TO SCHEDULE.                                ibuprofen 600 MG tablet   Commonly known as:  ADVIL/MOTRIN   Take 1 tablet (600 mg) by mouth every 6 hours as needed for pain (mild)                                levothyroxine 25 MCG tablet   Commonly known as:  SYNTHROID/LEVOTHROID   Take 1 tablet (25 mcg) by mouth daily                                MULTI-VITAMINS Tabs   Take 1 tablet by mouth                                oxyCODONE IR 5 MG tablet   Commonly known as:  ROXICODONE   Take 1-2 tablets (5-10 mg) by mouth every 3 hours as needed for pain or other (Moderate to Severe)   Last time this was given:  5 mg on 11/3/2017 12:39 PM

## 2017-11-03 NOTE — OP NOTE
General Surgery Operative Note    Pre-operative diagnosis:  Dysfunctional Uterine Bleeding With Fibroid    Post-operative diagnosis:  same    Procedure:  diagnostic laparoscopy and lysis of adhesions.     Surgeon: Damon Hall MD   Assistant(s): NONE   Anesthesia: General    Estimated blood loss:  see Dr. Fournier's operative report.     Drains placed: None   Complications:  None   Findings:   is a patient known to me from her recent robotic-assisted incisional hernia repair who was undergoing surgery by Dr. Fournier.  He felt that he needed to convert to a laparoscopic approach, and asked for my assistance in placing the initial port.  This was placed without difficulty using an optical technique in the left upper quadrant.  There were moderate adhesions up to the recent hernia repair.  These adhesions were divided bluntly.  The case was then turned back to Dr. Fournier.       Indications for operation: This is a patient who is undergoing surgery for dysfunctional uterine bleeding by Dr. Neptali Fournier.  He felt that he needed to convert to a laparoscopic approach, and given the patient's recent robotic-assisted incisional hernia repair, he asked me to place the initial trocar.    Details of the operation: The patient was already in the midst of surgery.  The abdomen was sterilely prepped and draped in the patient's old left upper quadrant 5 mm port site was opened.  An optical entry 5 mm port was placed without difficulty.  Pneumoperitoneum was achieved using CO2 insufflation.  A second 5 mm port was placed in the left lower quadrant.  There were some adhesions in the midabdomen up to the area of the previous hernia repair.  These were taken down bluntly, giving good visualization of the pelvis and lower abdomen.  The case was now turned back to Dr. Fournier, who will dictate the remainder of the course.  There was no measurable blood loss for this portion of the case.    Specimens:   ID Type Source Tests Collected by  Time Destination   A : Endocervical curettings Tissue Endocervical SURGICAL PATHOLOGY EXAM Neptali Fournier MD 11/3/2017 11:47 AM            Damon Hall MD

## 2017-11-03 NOTE — PROGRESS NOTES
Preop Diagnosis: menorrhagia; abnormal uterine bleeding unresponsive to conservative therapies    Post-Op Diagnosis: same, enlarged fibroid uterus, abnormal uterine cavity, intraabdominal adhesions    Procedure: Exam under anesthesia, diagnostic hysteroscopy and endocervical curretting, diagnostic laparoscopy with lysis of adhesions with Dr AZALEA Hall    Surgeon: MARTÍN BEAN MD, intraoperative consultation with Dr Hall of general surgery    EBL: 5 cc    Anesthesia: General    Path: ECC    Complications: see operative note    Findings: see dictated operative note for full details        MARTÍN BEAN MD  11:28 AM  11/3/2017

## 2017-11-03 NOTE — ANESTHESIA CARE TRANSFER NOTE
Patient: Sha Vidal    Procedure(s):  Exam Under Anestheisa, Diagnosistic Hysteroscopy, Fractional DILATION AND CURETTAGE, Resection of Endomterial Fibroid with Myosure, diagnostic laparoscopy - Wound Class: II-Clean Contaminated  diagnostic laparoscopy, lysis of adhesions - Wound Class: II-Clean Contaminated    Diagnosis: Dysfunctional Uterine Bleeding With Fibroid   Diagnosis Additional Information: No value filed.    Anesthesia Type:   General, LMA     Note:  Airway :Face Mask  Patient transferred to:PACU  Comments: VSS. Report to RNHandoff Report: Identifed the Patient, Identified the Reponsible Provider, Reviewed the pertinent medical history, Discussed the surgical course, Reviewed Intra-OP anesthesia mangement and issues during anesthesia, Set expectations for post-procedure period and Allowed opportunity for questions and acknowledgement of understanding      Vitals: (Last set prior to Anesthesia Care Transfer)    CRNA VITALS  11/3/2017 1105 - 11/3/2017 1143      11/3/2017             SpO2: 98 %                Electronically Signed By: DULCE Huston CRNA  November 3, 2017  11:43 AM

## 2017-11-03 NOTE — IP AVS SNAPSHOT
[facility] PreOP/PostOP    [address]    [facility] [address]    Phone:  [phone]    Fax:  [phone]                                       After Visit Summary   11/3/2017    Sha Vidal    MRN: [id number]           After Visit Summary Signature Page     I have received my discharge instructions, and my questions have been answered. I have discussed any challenges I see with this plan with the nurse or doctor.    ..........................................................................................................................................  Patient/Patient Representative Signature      ..........................................................................................................................................  Patient Representative Print Name and Relationship to Patient    ..................................................               ................................................  Date                                            Time    ..........................................................................................................................................  Reviewed by Signature/Title    ...................................................              ..............................................  Date                                                            Time

## 2017-11-03 NOTE — ANESTHESIA PREPROCEDURE EVALUATION
Anesthesia Evaluation     . Pt has had prior anesthetic. Type: General    No history of anesthetic complications          ROS/MED HX    ENT/Pulmonary:  - neg pulmonary ROS     Neurologic:  - neg neurologic ROS   (+)migraines,     Cardiovascular:  - neg cardiovascular ROS       METS/Exercise Tolerance:     Hematologic:  - neg hematologic  ROS       Musculoskeletal:  - neg musculoskeletal ROS       GI/Hepatic:  - neg GI/hepatic ROS       Renal/Genitourinary:  - ROS Renal section negative       Endo:     (+) thyroid problem hypothyroidism, .      Psychiatric:  - neg psychiatric ROS       Infectious Disease:  - neg infectious disease ROS       Malignancy:      - no malignancy   Other:    - neg other ROS                 Physical Exam  Normal systems: cardiovascular, pulmonary and dental    Airway   Mallampati: I  TM distance: >3 FB  Neck ROM: full    Dental     Cardiovascular       Pulmonary                     Anesthesia Plan      History & Physical Review  History and physical reviewed and following examination; no interval change.    ASA Status:  2 .    NPO Status:  > 8 hours    Plan for General and LMA with Intravenous and Propofol induction. Maintenance will be Balanced.    PONV prophylaxis:  Ondansetron (or other 5HT-3) and Dexamethasone or Solumedrol       Postoperative Care  Postoperative pain management:  IV analgesics and Oral pain medications.      Consents  Anesthetic plan, risks, benefits and alternatives discussed with:  Patient or representative and Patient..                          .

## 2017-11-04 NOTE — OP NOTE
DATE OF PROCEDURE:  11/03/2017      PREOPERATIVE DIAGNOSIS:  Persistent menorrhagia abnormal uterine bleeding unresponsive to medical management, enlarged fibroid uterus.      POSTOPERATIVE DIAGNOSES:     1.  Persistent menorrhagia abnormal uterine bleeding unresponsive to medical management, enlarged fibroid uterus.   2.  Abnormal uterine cavity.     3.  Intra-abdominal adhesions.      PROCEDURE:  Exam under anesthesia, diagnostic hysteroscopy, endocervical curettings.  Intraoperative consultation was requested and performed by Dr. Damon Hall a general surgeon, who performed a diagnostic laparoscopy with lysis of omental adhesions to the abdominal wall.  Please see his operative note for full details.      INDICATIONS:  Sha Vidal is a 47-year-old white female para 3-0-4-3, tubal sterilization, last menstrual period 7/8/2017, who I saw for evaluation of longstanding history of menorrhagia.  As a part of her workup, a CT scan had been performed for an unrelated cause and noted uterine fibroids.  The patient states that she had heavy periods for the past 10 years.  She stated that her periods have gotten better with removal of fibroid a year and a half ago but now they are getting worse again.  The patient states that her flow is so heavy that it prevents her from leaving the house, A super pad and tampon would take less than  2 hours to fill.  The patient states that she has regular menses every 30 days.  Sonohysterogram and endometrial biopsy were performed as a part of her workup.  The endometrial biopsy showed benign proliferative endometrium with focal disordered proliferation fragments compatible with a polyp.  Sonohysterogram was performed showing an enlarged fibroid uterus with multiple fibromata the endometrial cavity lining was 4 mm was distorted and the right ovary was normal.  The Left ovary contained an approximate 1.3 x 1.1 x 1.3 cm complex cyst.  In reviewing these findings with the patient,  discussing with her at length the risks, benefits and alternative forms of therapy, the patient made a decision to proceed with a diagnostic hysteroscopy and resection of endometrial filling defect.  I think she has a good understanding of the nature of the problem, the nature of the procedure, the risks, the benefits and the alternatives.  All of her questions have been answered and informed consent has been obtained.      OPERATIVE FINDINGS:  Exam under anesthesia:  External genitalia, BUS without lesions.  Speculum, normal rugae, multip cervix, no lesions.  Bimanual exam:  The uterus feels consistent with approximately 10 weeks' size with suggestion of an approximate 4 cm fibroid in the mid fundal portion.  Adnexae suggests decreased mobility, but no definite masses.  Rectovaginal exam:  No uterosacral ligament nodularity, but decreased mobility was noted.  Details from the hysteroscopy:  The endocervical canal is within normal limits.  The endometrial cavity sounded to 12 cm and with the hysteroscope contained multiple synechiae and a suggestion of an approximate 1.5 cm polypoid structure in the left lower uterine segment.  I was unable to clearly identify the fundus of the uterus, I was unable to clearly identify tubal ostia.  There was no obvious source of perforation hysteroscopically.  Details from the diagnostic laparoscopy:  Please see Dr. Hall's note with full details of this.  In  the process of doing the hysteroscopy we noticed quickly a fluid deficit at 1525 mL.  A total of 2358 mL of saline had been instilled and balance recovered leaving a deficit at 1525 mL.  No obvious source of the loss was noted.  Because of a concern of potential perforation intraoperative consultation was undertaken.        DESCRIPTION OF PROCEDURE:  After obtaining informed consent, the patient was taken to the operating room where a general anesthetic was utilized.  Pneumatic stockings had been placed preoperatively.  The  patient was placed in the dorsal lithotomy position in [facility] legs approximately 30 degrees to the horizontal.  An exam under anesthesia was performed.  Following this, she was prepped and draped in the usual sterile fashion.  A hard stop was performed.  The bladder was drained of approximately 200 mL of clear urine.  A bivalved speculum placed in the vagina, the cervix visualized, grasped with a tenaculum and endocervical curettings were performed and submitted to pathology.  The endometrial cavity sounded to 12 cm.  The endocervical canal was dilated to a #18 Carlos dilator and using MyoSure hysteroscope hysteroscopy was performed.  I was unable to get a clear unobstructed view the endometrial cavity.  Because of this, the conventional smaller hysteroscope was brought in and again I noted extensive intracavitary adhesions with the suggestion of an apparent polyp in the left lower uterine segment approximately 2 x 3 cm.  We quickly progressed to a fluid deficit at 1525 mL, I did not notice any obvious source of perforation.  The hysteroscope was removed.  The endometrial cavity was explored with a uterine sound and no palpable defect was noted.  The patient had a previous robotic ventral hernia repair by Dr. Hall and I consulted with Dr. Hall again to come in to the surgery as it was necessary to proceed with a laparoscopy to exclude any possibility of uterine perforation.  Please see his dictated note for full details.  At the time of laparoscopy, the abdominal cavity was examined.  There was no source of perforation noted.  The patient was status post tubal sterilization procedure.  The right and left ovary were seen and appeared to be within normal limits.  I did not find any evidence of complex cyst on the left ovary.  There was extensive adhesive disease and tunneling in the cul-de-sac with tunneling present by the base of the right uterosacral ligament as well as the midportion of the left  uterosacral ligament.  I did not see any excrescences active endometriosis or obvious signs of malignancy.  The left round ligament is within normal limits.  Anterior surface of the uterus and bladder flap are within normal limits.  Right round ligament is within normal limits.  The proximal fallopian tube on the right side was noted distal fimbriated end was noted with a sterilization procedure performed.  Right ovary is as noted.  Right lateral pelvic sidewalls, otherwise unremarkable.  Posterior aspect of the uterus showed no evidence of perforation.  The cul-de-sac,  was visualized and other than tunneling no active endometriosis or abnormalities were noted.  Rectum also appeared to be normal.  The left lateral sidewall other than tunneling in the region of the left uterosacral ligament was unremarkable.  There were thin omental adhesions to the anterior abdominal wall that Dr. Hall removed.  Please see his operative note for full details.  With an otherwise unremarkable laparoscopy in hand, the decision was made to forgo further intervention.  The abdomen was deflated of CO2 gas and skin incisions were closed by myself with subcuticular sutures of 4-0 Monocryl and Dermabond.  Because of the fluid deficit and inability to comfortably visualize the endometrial lining I elected to forego any further hysteroscopic resection.  Instrumentation was removed.  Instruments, sponge and needle counts were all checked and were documented correct x2.  Hemostasis was checked and documented to be acceptable.  Estimated blood loss was approximately 5 mL.  The patient otherwise tolerated the procedure well with no other difficulties or complications.  She was awoken and returned to the recovery area in good condition.  During this procedure, when there was a concern of excessive fluid deficit, I spoke to the patient's significant other and explained to him what I was doing and got permission to proceed with laparoscopy.          MARTÍN FOURNIER MD             D: 2017 11:48   T: 2017 20:07   MT: [id number]      Name:     DOLLY ESTRADA   MRN:      [id number]        Account:        [id number]   :      1970           Procedure Date: 2017      Document: [id number]       cc: Damon Fournier MD

## 2017-11-06 LAB — COPATH REPORT: NORMAL

## 2017-11-07 ENCOUNTER — OFFICE VISIT (OUTPATIENT)
Dept: OBGYN | Facility: CLINIC | Age: 47
End: 2017-11-07
Payer: COMMERCIAL

## 2017-11-07 VITALS — DIASTOLIC BLOOD PRESSURE: 70 MMHG | WEIGHT: 170 LBS | BODY MASS INDEX: 31.09 KG/M2 | SYSTOLIC BLOOD PRESSURE: 107 MMHG

## 2017-11-07 DIAGNOSIS — N93.9 ABNORMAL UTERINE BLEEDING: Primary | ICD-10-CM

## 2017-11-07 DIAGNOSIS — D25.9 UTERINE LEIOMYOMA, UNSPECIFIED LOCATION: ICD-10-CM

## 2017-11-07 LAB
BASOPHILS # BLD AUTO: 0 10E9/L (ref 0–0.2)
BASOPHILS NFR BLD AUTO: 0 %
DIFFERENTIAL METHOD BLD: ABNORMAL
EOSINOPHIL # BLD AUTO: 0.4 10E9/L (ref 0–0.7)
EOSINOPHIL NFR BLD AUTO: 4.4 %
ERYTHROCYTE [DISTWIDTH] IN BLOOD BY AUTOMATED COUNT: 14.9 % (ref 10–15)
HCT VFR BLD AUTO: 35.5 % (ref 35–47)
HGB BLD-MCNC: 11.4 G/DL (ref 11.7–15.7)
LYMPHOCYTES # BLD AUTO: 1.7 10E9/L (ref 0.8–5.3)
LYMPHOCYTES NFR BLD AUTO: 21.5 %
MCH RBC QN AUTO: 25.5 PG (ref 26.5–33)
MCHC RBC AUTO-ENTMCNC: 32.1 G/DL (ref 31.5–36.5)
MCV RBC AUTO: 79 FL (ref 78–100)
MONOCYTES # BLD AUTO: 0.7 10E9/L (ref 0–1.3)
MONOCYTES NFR BLD AUTO: 8.1 %
NEUTROPHILS # BLD AUTO: 5.3 10E9/L (ref 1.6–8.3)
NEUTROPHILS NFR BLD AUTO: 66 %
PLATELET # BLD AUTO: 381 10E9/L (ref 150–450)
RBC # BLD AUTO: 4.47 10E12/L (ref 3.8–5.2)
WBC # BLD AUTO: 8 10E9/L (ref 4–11)

## 2017-11-07 PROCEDURE — 99213 OFFICE O/P EST LOW 20 MIN: CPT | Performed by: OBSTETRICS & GYNECOLOGY

## 2017-11-07 PROCEDURE — 85025 COMPLETE CBC W/AUTO DIFF WBC: CPT | Performed by: OBSTETRICS & GYNECOLOGY

## 2017-11-07 PROCEDURE — 36415 COLL VENOUS BLD VENIPUNCTURE: CPT | Performed by: OBSTETRICS & GYNECOLOGY

## 2017-11-07 RX ORDER — MEDROXYPROGESTERONE ACETATE 10 MG
10 TABLET ORAL DAILY
Qty: 10 TABLET | Refills: 0 | Status: SHIPPED | OUTPATIENT
Start: 2017-11-07 | End: 2017-12-19

## 2017-11-07 NOTE — MR AVS SNAPSHOT
After Visit Summary   11/7/2017    Sha Vidal    MRN: [id number]           Patient Information     Date Of Birth          1970        Visit Information        Provider Department      11/7/2017 2:30 PM Neptali Fournier MD [facility]        Today's Diagnoses     Abnormal uterine bleeding    -  1    Uterine leiomyoma, unspecified location          Care Instructions    You can reach your [facility] Care Team any time of the day by calling [phone]. This number will put you in touch with the 24 hour nurse line if the clinic is closed.    To contact your OB/GYN Station Coordinator/Surgery Scheduler please call [phone]. This is a direct number for your care team between 8 a.m. and 4 p.m. Monday through Friday.    [facility] is open for your convenience:  Monday through Friday 8 a.m. to 6 p.m.  Closed weekends and all major holidays.            Follow-ups after your visit        Who to contact     If you have questions or need follow up information about today's clinic visit or your schedule please contact [facility] directly at [phone].  Normal or non-critical lab and imaging results will be communicated to you by Sifteohart, letter or phone within 4 business days after the clinic has received the results. If you do not hear from us within 7 days, please contact the clinic through Knee Creation[employer] or phone. If you have a critical or abnormal lab result, we will notify you by phone as soon as possible.  Submit refill requests through Cloud [employer] or call your pharmacy and they will forward the refill request to us. Please allow 3 business days for your refill to be completed.          Additional Information About Your Visit        Sifteohart Information     [employer] gives you secure access to your electronic health record. If you see a primary care provider, you can also send messages to your care team and make appointments. If you have questions, please call your  primary care clinic.  If you do not have a primary care provider, please call [phone] and they will assist you.        Care EveryWhere ID     This is your Care EveryWhere ID. This could be used by other organizations to access your [facility] medical records  [id number]        Your Vitals Were     Last Period BMI (Body Mass Index)                10/05/2017 (Exact Date) 31.09 kg/m2           Blood Pressure from Last 3 Encounters:   11/07/17 107/70   11/03/17 100/66   10/23/17 110/78    Weight from Last 3 Encounters:   11/07/17 170 lb (77.1 kg)   11/03/17 165 lb (74.8 kg)   10/23/17 168 lb (76.2 kg)              We Performed the Following     CBC with platelets and differential          Today's Medication Changes          These changes are accurate as of: 11/7/17 11:59 PM.  If you have any questions, ask your nurse or doctor.               Start taking these medicines.        Dose/Directions    medroxyPROGESTERone 10 MG tablet   Commonly known as:  PROVERA   Used for:  Abnormal uterine bleeding   Started by:  Neptali Fournier MD        Dose:  10 mg   Take 1 tablet (10 mg) by mouth daily for 10 days   Quantity:  10 tablet   Refills:  0            Where to get your medicines      These medications were sent to [facility]/pharmacy #[id number] - [place] [facility] [address] [address]  [address] [address] [address]     Phone:  [phone]     medroxyPROGESTERone 10 MG tablet                Primary Care Provider Office Phone # Fax #    Anita Quiros PA-C [phone] [address]  [facility] [address]        Equal Access to Services     [facility] AH: Hadii melodie pearce hadasho Soomaali, waaxda luqadaha, qaybta kaalmada laegmadhavi, morales donahue. So [facility] [place] [facility] [phone].    ATENCIÓN: Si habla español, tiene a collins disposición servicios gratuitos de asistencia lingüística. Llame al [phone].    We comply with applicable federal civil rights laws and [place] laws. We do  not discriminate on the basis of race, color, national origin, age, disability, sex, sexual orientation, or gender identity.            Thank you!     Thank you for choosing [facility]  for your care. Our goal is always to provide you with excellent care. Hearing back from our patients is one way we can continue to improve our services. Please take a few minutes to complete the written survey that you may receive in the mail after your visit with us. Thank you!             Your Updated Medication List - Protect others around you: Learn how to safely use, store and throw away your medicines at www.disposemymeds.org.          This list is accurate as of: 11/7/17 11:59 PM.  Always use your most recent med list.                   Brand Name Dispense Instructions for use Diagnosis    acetaminophen 325 MG tablet    TYLENOL    100 tablet    Take 2 tablets (650 mg) by mouth every 4 hours as needed for other (mild pain)    Post-operative state       cetirizine 10 MG tablet    zyrTEC     Take 10 mg by mouth daily        D 2000 2000 UNITS tablet   Generic drug:  cholecalciferol      Take 2,000 Units by mouth        FLUoxetine 10 MG capsule    PROzac    90 capsule    TAKE 1 CAPSULE BY MOUTH  DAILY DUE FOR AN ANNUAL  VISIT IN JULY. PLEASE CALL  CLINIC TO SCHEDULE.    Adjustment disorder with mixed anxiety and depressed mood       ibuprofen 600 MG tablet    ADVIL/MOTRIN    30 tablet    Take 1 tablet (600 mg) by mouth every 6 hours as needed for pain (mild)    Post-operative state       levothyroxine 25 MCG tablet    SYNTHROID/LEVOTHROID    90 tablet    Take 1 tablet (25 mcg) by mouth daily    Hypothyroidism, unspecified type       medroxyPROGESTERone 10 MG tablet    PROVERA    10 tablet    Take 1 tablet (10 mg) by mouth daily for 10 days    Abnormal uterine bleeding       MULTI-VITAMINS Tabs      Take 1 tablet by mouth        oxyCODONE IR 5 MG tablet    ROXICODONE    10 tablet    Take 1-2 tablets (5-10 mg) by  mouth every 3 hours as needed for pain or other (Moderate to Severe)    Post-operative state

## 2017-11-07 NOTE — PATIENT INSTRUCTIONS
You can reach your [facility] Care Team any time of the day by calling [phone]. This number will put you in touch with the 24 hour nurse line if the clinic is closed.    To contact your OB/GYN Station Coordinator/Surgery Scheduler please call [phone]. This is a direct number for your care team between 8 a.m. and 4 p.m. Monday through Friday.    [facility] is open for your convenience:  Monday through Friday 8 a.m. to 6 p.m.  Closed weekends and all major holidays.

## 2017-11-07 NOTE — NURSING NOTE
"Chief Complaint   Patient presents with     Surgical Followup       Initial /70  Wt 170 lb (77.1 kg)  LMP 10/05/2017 (Exact Date)  BMI 31.09 kg/m2 Estimated body mass index is 31.09 kg/(m^2) as calculated from the following:    Height as of 11/3/17: 5' 2\" (1.575 m).    Weight as of this encounter: 170 lb (77.1 kg).  BP completed using cuff size: regular        The following HM Due: NONE      The following patient reported/Care Every where data was sent to:  P ABSTRACT QUALITY INITIATIVES [[id number]]       Isa Peña CMA                "

## 2017-11-11 NOTE — PROGRESS NOTES
Sha Vidal is a 47-year-old white female para 3-0-4-3, tubal sterilization, last menstrual period 7/8/2017, who I saw for evaluation of longstanding history of menorrhagia.  As a part of her workup, a CT scan had been performed for an unrelated cause and noted uterine fibroids.  The patient states that she had heavy periods for the past 10 years.  She stated that her periods have gotten better with removal of fibroid a year and a half ago but now they are getting worse again.  The patient states that her flow is so heavy that it prevents her from leaving the house,    On 11/3/17 she underwent an Exam under anesthesia, diagnostic hysteroscopy, endocervical curettings.  Intraoperative consultation was requested and performed by Dr. Damon Hall a general surgeon, who performed a diagnostic laparoscopy with lysis of omental adhesions to the abdominal wall.  Please see his operative note for full details.   We did not complete the resection of the endometrial filling defects as the fluid deficit quickly progressed to > 1500 cc without known cause.  There was no evidence of perforation or hemodynamic compromise  I again reviewed the intraop findings with the pt and her SO today and discussed the RB&A's    (N93.9) Abnormal uterine bleeding  (primary encounter diagnosis)  Comment: at this time the pt desires a 3 month course of provera 10 mg daily cycle day 1-10  Pt to keep a bleeding calendar and RTC when complete for re-eval  Plan: medroxyPROGESTERone (PROVERA) 10 MG tablet, CBC        with platelets and differential        Risks, benefits, and alternative modes of therapy discussed at length. Pathophysiology of the disease process reviewed, all of the patients questions answered and informed consent obtained.      (D25.9) Uterine leiomyoma, unspecified location  Comment: as above  Plan: pt declines hysterectomy or further surgical intervention at this time  Written outline and plan given

## 2017-11-15 DIAGNOSIS — F43.23 ADJUSTMENT DISORDER WITH MIXED ANXIETY AND DEPRESSED MOOD: ICD-10-CM

## 2017-11-17 RX ORDER — FLUOXETINE 10 MG/1
CAPSULE ORAL
Qty: 90 CAPSULE | Refills: 2 | Status: SHIPPED | OUTPATIENT
Start: 2017-11-17 | End: 2018-04-17

## 2017-11-17 NOTE — TELEPHONE ENCOUNTER
Prescription approved per [facility].  This had been previously filled to local pharmacy but new request was from mail order. Sent balance of refill there.

## 2017-12-15 DIAGNOSIS — E03.9 HYPOTHYROIDISM, UNSPECIFIED TYPE: ICD-10-CM

## 2017-12-15 NOTE — TELEPHONE ENCOUNTER
Requested Prescriptions   Pending Prescriptions Disp Refills     levothyroxine (SYNTHROID/LEVOTHROID) 25 MCG tablet [Pharmacy Med Name: LEVOTHYROXINE  0.025MG  TAB]  Last Written Prescription Date:  12/16/16  Last Fill Quantity: 90,  # refills: 3   Last Office Visit with [facility], P or [facility] prescribing provider:  10/20/17   Future Office Visit:      90 tablet      Sig: TAKE 1 TABLET BY MOUTH  DAILY    Thyroid Protocol Passed    12/15/2017  3:48 PM       Passed - Patient is 12 years or older       Passed - Recent or future visit with authorizing provider's specialty    Patient had office visit in the last year or has a visit in the next 30 days with authorizing provider.  See chart review.              Passed - Normal TSH on file in past 12 months    Recent Labs   Lab Test  07/17/17   0907   TSH  2.93             Passed - No active pregnancy on record    If patient is pregnant or has had a positive pregnancy test, please check TSH.         Passed - No positive pregnancy test in past 12 months    If patient is pregnant or has had a positive pregnancy test, please check TSH.

## 2017-12-19 DIAGNOSIS — N93.9 ABNORMAL UTERINE BLEEDING: ICD-10-CM

## 2017-12-19 RX ORDER — LEVOTHYROXINE SODIUM 25 UG/1
TABLET ORAL
Qty: 90 TABLET | Refills: 1 | Status: SHIPPED | OUTPATIENT
Start: 2017-12-19 | End: 2018-06-24

## 2017-12-19 RX ORDER — MEDROXYPROGESTERONE ACETATE 10 MG
TABLET ORAL
Qty: 10 TABLET | Refills: 1 | Status: SHIPPED | OUTPATIENT
Start: 2017-12-19 | End: 2018-04-17

## 2017-12-19 NOTE — TELEPHONE ENCOUNTER
rx approved. Per Dr. Fournier's office visit note patient was to try the provera for 3 months. Additional two month supply given as the pt has only done medication for one month.      Caryn Razo RN

## 2017-12-19 NOTE — TELEPHONE ENCOUNTER
Prescription approved per [facility] Refill Protocol.  Aliyah Unger RN

## 2018-04-17 ENCOUNTER — OFFICE VISIT (OUTPATIENT)
Dept: FAMILY MEDICINE | Facility: CLINIC | Age: 48
End: 2018-04-17
Payer: COMMERCIAL

## 2018-04-17 VITALS
HEART RATE: 80 BPM | HEIGHT: 62 IN | SYSTOLIC BLOOD PRESSURE: 112 MMHG | WEIGHT: 170.6 LBS | TEMPERATURE: 98.2 F | DIASTOLIC BLOOD PRESSURE: 74 MMHG | RESPIRATION RATE: 16 BRPM | BODY MASS INDEX: 31.39 KG/M2 | OXYGEN SATURATION: 100 %

## 2018-04-17 DIAGNOSIS — R53.83 OTHER FATIGUE: ICD-10-CM

## 2018-04-17 DIAGNOSIS — Z02.89 ENCOUNTER FOR COMPLETION OF FORM WITH PATIENT: Primary | ICD-10-CM

## 2018-04-17 DIAGNOSIS — F43.23 ADJUSTMENT DISORDER WITH MIXED ANXIETY AND DEPRESSED MOOD: ICD-10-CM

## 2018-04-17 DIAGNOSIS — G47.00 INSOMNIA, UNSPECIFIED TYPE: ICD-10-CM

## 2018-04-17 DIAGNOSIS — D64.9 LOW HEMOGLOBIN: ICD-10-CM

## 2018-04-17 DIAGNOSIS — Z13.1 SCREENING FOR DIABETES MELLITUS: ICD-10-CM

## 2018-04-17 DIAGNOSIS — Z12.39 SCREENING FOR BREAST CANCER: ICD-10-CM

## 2018-04-17 DIAGNOSIS — Z13.220 LIPID SCREENING: ICD-10-CM

## 2018-04-17 LAB
ERYTHROCYTE [DISTWIDTH] IN BLOOD BY AUTOMATED COUNT: 16.5 % (ref 10–15)
FERRITIN SERPL-MCNC: 4 NG/ML (ref 8–252)
HBA1C MFR BLD: 5.4 % (ref 0–5.6)
HCT VFR BLD AUTO: 28.2 % (ref 35–47)
HGB BLD-MCNC: 8.5 G/DL (ref 11.7–15.7)
IRON SATN MFR SERPL: 2 % (ref 15–46)
IRON SERPL-MCNC: 8 UG/DL (ref 35–180)
MCH RBC QN AUTO: 21 PG (ref 26.5–33)
MCHC RBC AUTO-ENTMCNC: 30.1 G/DL (ref 31.5–36.5)
MCV RBC AUTO: 70 FL (ref 78–100)
PLATELET # BLD AUTO: 452 10E9/L (ref 150–450)
RBC # BLD AUTO: 4.05 10E12/L (ref 3.8–5.2)
TIBC SERPL-MCNC: 371 UG/DL (ref 240–430)
WBC # BLD AUTO: 5.8 10E9/L (ref 4–11)

## 2018-04-17 PROCEDURE — 83550 IRON BINDING TEST: CPT | Performed by: PHYSICIAN ASSISTANT

## 2018-04-17 PROCEDURE — 84443 ASSAY THYROID STIM HORMONE: CPT | Performed by: PHYSICIAN ASSISTANT

## 2018-04-17 PROCEDURE — 83540 ASSAY OF IRON: CPT | Performed by: PHYSICIAN ASSISTANT

## 2018-04-17 PROCEDURE — 36415 COLL VENOUS BLD VENIPUNCTURE: CPT | Performed by: PHYSICIAN ASSISTANT

## 2018-04-17 PROCEDURE — 82728 ASSAY OF FERRITIN: CPT | Performed by: PHYSICIAN ASSISTANT

## 2018-04-17 PROCEDURE — 99214 OFFICE O/P EST MOD 30 MIN: CPT | Performed by: PHYSICIAN ASSISTANT

## 2018-04-17 PROCEDURE — 83036 HEMOGLOBIN GLYCOSYLATED A1C: CPT | Performed by: PHYSICIAN ASSISTANT

## 2018-04-17 PROCEDURE — 85027 COMPLETE CBC AUTOMATED: CPT | Performed by: PHYSICIAN ASSISTANT

## 2018-04-17 PROCEDURE — 83516 IMMUNOASSAY NONANTIBODY: CPT | Performed by: PHYSICIAN ASSISTANT

## 2018-04-17 PROCEDURE — 80053 COMPREHEN METABOLIC PANEL: CPT | Performed by: PHYSICIAN ASSISTANT

## 2018-04-17 PROCEDURE — 80061 LIPID PANEL: CPT | Performed by: PHYSICIAN ASSISTANT

## 2018-04-17 RX ORDER — LANOLIN ALCOHOL/MO/W.PET/CERES
5 CREAM (GRAM) TOPICAL
Qty: 30 TABLET | Refills: 0 | COMMUNITY
Start: 2018-04-17 | End: 2022-04-22

## 2018-04-17 ASSESSMENT — ANXIETY QUESTIONNAIRES
GAD7 TOTAL SCORE: 3
2. NOT BEING ABLE TO STOP OR CONTROL WORRYING: NOT AT ALL
IF YOU CHECKED OFF ANY PROBLEMS ON THIS QUESTIONNAIRE, HOW DIFFICULT HAVE THESE PROBLEMS MADE IT FOR YOU TO DO YOUR WORK, TAKE CARE OF THINGS AT HOME, OR GET ALONG WITH OTHER PEOPLE: SOMEWHAT DIFFICULT
7. FEELING AFRAID AS IF SOMETHING AWFUL MIGHT HAPPEN: NOT AT ALL
1. FEELING NERVOUS, ANXIOUS, OR ON EDGE: SEVERAL DAYS
3. WORRYING TOO MUCH ABOUT DIFFERENT THINGS: NOT AT ALL
5. BEING SO RESTLESS THAT IT IS HARD TO SIT STILL: NOT AT ALL
6. BECOMING EASILY ANNOYED OR IRRITABLE: SEVERAL DAYS

## 2018-04-17 ASSESSMENT — ENCOUNTER SYMPTOMS
CONSTIPATION: 1
SHORTNESS OF BREATH: 0
HEADACHES: 0
WEAKNESS: 1
NAUSEA: 0
DIZZINESS: 0
DIARRHEA: 0
MYALGIAS: 0
NERVOUS/ANXIOUS: 0
FREQUENCY: 0
HEMATOCHEZIA: 0
COUGH: 0
ARTHRALGIAS: 1
PARESTHESIAS: 1
CHILLS: 0
DYSURIA: 0
HEMATURIA: 0
PALPITATIONS: 0
ABDOMINAL PAIN: 0
SORE THROAT: 0
EYE PAIN: 0
FEVER: 0
JOINT SWELLING: 1

## 2018-04-17 ASSESSMENT — PATIENT HEALTH QUESTIONNAIRE - PHQ9: 5. POOR APPETITE OR OVEREATING: SEVERAL DAYS

## 2018-04-17 NOTE — PROGRESS NOTES
SUBJECTIVE:   CC: Sha Vidal is an 48 year old woman who presents for preventive health visit.     Patient is fasting  Concerns:  1. Wants to change depression meds  Currently on 10 mg prozac    Physical   Annual:     Getting at least 3 servings of Calcium per day::  Yes    Bi-annual eye exam::  Yes    Dental care twice a year::  Yes    Sleep apnea or symptoms of sleep apnea::  None    Diet::  Regular (no restrictions)    Frequency of exercise::  2-3 days/week    Duration of exercise::  N/A    Taking medications regularly::  Yes    Medication side effects::  Other    Additional concerns today::  YES                Today's PHQ-2 Score:   PHQ-2 ( 1999 [employer]) 7/14/2017   Q1: Little interest or pleasure in doing things 0   Q2: Feeling down, depressed or hopeless 0   PHQ-2 Score 0   Q1: Little interest or pleasure in doing things Not at all   Q2: Feeling down, depressed or hopeless Not at all   PHQ-2 Score 0     Abuse: Current or Past(Physical, Sexual or Emotional)- {YES/NO/NA:707791}  Do you feel safe in your environment - {YES/NO/NA:709309}    Social History   Substance Use Topics     Smoking status: Never Smoker     Smokeless tobacco: Never Used     Alcohol use 0.0 oz/week     0 Standard drinks or equivalent per week      Comment: occasionally     No flowsheet data found.{add AUDIT responses (Optional) (A score of 7 for adult men is an indication of hazardous drinking; a score of 8 or more is an indication of an alcohol use disorder.  A score of 7 or more for adult women is an indication of hazardous drinking or an alchohol use disorder):466334}    Reviewed orders with patient.  Reviewed health maintenance and updated orders accordingly - Yes  {Chronicprobdata (Optional):622923}    {Mammo Decision Support (Optional):546653}    Pertinent mammograms are reviewed under the imaging tab.  History of abnormal Pap smear: {PAP HX:873670}    Reviewed and updated as needed this visit by clinical staff         Reviewed  "and updated as needed this visit by Provider        {HISTORY OPTIONS (Optional):776904}    Review of Systems   Constitutional: Negative for chills and fever.   HENT: Negative for congestion, ear pain, hearing loss and sore throat.    Eyes: Negative for pain and visual disturbance.   Respiratory: Negative for cough and shortness of breath.    Cardiovascular: Negative for chest pain, palpitations and peripheral edema.   Gastrointestinal: Positive for constipation. Negative for abdominal pain, diarrhea, hematochezia and nausea.   Genitourinary: Negative for dysuria, frequency, genital sores, hematuria, pelvic pain, urgency, vaginal bleeding and vaginal discharge.   Musculoskeletal: Positive for arthralgias and joint swelling. Negative for myalgias.   Skin: Positive for rash.   Neurological: Positive for weakness and paresthesias. Negative for dizziness and headaches.   Psychiatric/Behavioral: Positive for mood changes. The patient is not nervous/anxious.         OBJECTIVE:   There were no vitals taken for this visit.  Physical Exam  {Exam Choices:629415}    ASSESSMENT/PLAN:   {Diag Picklist:943050}    COUNSELING:  {FEMALE COUNSELING MESSAGES:411318::\"Reviewed preventive health counseling, as reflected in patient instructions\"}    {BP Counseling- Complete if BP >= 120/80  (Optional):301667}     reports that she has never smoked. She has never used smokeless tobacco.  {Tobacco Cessation -- Complete if patient is a smoker (Optional):024586}  Estimated body mass index is 31.09 kg/(m^2) as calculated from the following:    Height as of 11/3/17: 5' 2\" (1.575 m).    Weight as of 11/7/17: 170 lb (77.1 kg).   {Weight Management Plan (ACO) Complete if BMI is abnormal-  Ages 18-64  BMI >24.9.  Age 65+ with BMI <23 or >30 (Optional):635513}    Counseling Resources:  ATP IV Guidelines  Pooled Cohorts Equation Calculator  Breast Cancer Risk Calculator  FRAX Risk Assessment  ICSI Preventive Guidelines  Dietary Guidelines for " Americans, 2010  USDA's MyPlate  ASA Prophylaxis  Lung CA Screening    Anita Quiros PA-C  [facility] [place]  Answers for HPI/ROS submitted by the patient on 4/17/2018   PHQ-2 Score: 0

## 2018-04-17 NOTE — MR AVS SNAPSHOT
After Visit Summary   4/17/2018    Sha Vidal    MRN: [id number]           Patient Information     Date Of Birth          1970        Visit Information        Provider Department      4/17/2018 7:30 AM Anita Quiros PA-C [facility]        Today's Diagnoses     Encounter for completion of form with patient    -  1    Adjustment disorder with mixed anxiety and depressed mood        Screening for breast cancer        Screening for diabetes mellitus        Lipid screening        Insomnia, unspecified type          Care Instructions      Preventive Health Recommendations  Female Ages 40 to 49    Yearly exam:     See your health care provider every year in order to  1. Review health changes.   2. Discuss preventive care.    3. Review your medicines if your doctor prescribed any.      Get a Pap test every three years (unless you have an abnormal result and your provider advises testing more often).      If you get Pap tests with HPV test, you only need to test every 5 years, unless you have an abnormal result. You do not need a Pap test if your uterus was removed (hysterectomy) and you have not had cancer.      You should be tested each year for STDs (sexually transmitted diseases), if you're at risk.       Ask your doctor if you should have a mammogram.      Have a colonoscopy (test for colon cancer) if someone in your family has had colon cancer or polyps before age 50.       Have a cholesterol test every 5 years.       Have a diabetes test (fasting glucose) after age 45. If you are at risk for diabetes, you should have this test every 3 years.    Shots: Get a flu shot each year. Get a tetanus shot every 10 years.     Nutrition:     Eat at least 5 servings of fruits and vegetables each day.    Eat whole-grain bread, whole-wheat pasta and brown rice instead of white grains and rice.    Talk to your provider about Calcium and Vitamin D.     Lifestyle    Exercise at least  150 minutes a week (an average of 30 minutes a day, 5 days a week). This will help you control your weight and prevent disease.    Limit alcohol to one drink per day.    No smoking.     Wear sunscreen to prevent skin cancer.    See your dentist every six months for an exam and cleaning.          Follow-ups after your visit        Follow-up notes from your care team     Return in about 4 weeks (around 5/15/2018) for mood recheck.      Your next 10 appointments already scheduled     Apr 27, 2018  3:30 PM CDT   (Arrive by 3:15 PM)   MA SCREENING DIGITAL BILATERAL with [facility]   [facility]    [address] [address] [phone]           Do not use any powder, lotion or deodorant under your arms or on your breast. If you do, we will ask you to remove it before your exam.  Wear comfortable, two-piece clothing.  If you have any allergies, tell your care team.  Bring any previous mammograms from other facilities or have them mailed to the breast center.              Future tests that were ordered for you today     Open Future Orders        Priority Expected Expires Ordered    *MA Screening Digital Bilateral Routine  4/17/2019 4/17/2018            Who to contact     If you have questions or need follow up information about today's clinic visit or your schedule please contact [facility] directly at [phone].  Normal or non-critical lab and imaging results will be communicated to you by MyChart, letter or phone within 4 business days after the clinic has received the results. If you do not hear from us within 7 days, please contact the clinic through MyChart or phone. If you have a critical or abnormal lab result, we will notify you by phone as soon as possible.  Submit refill requests through [employer] or call your pharmacy and they will forward the refill request to us. Please allow 3 business days for your refill to be completed.        "   Additional Information About Your Visit        MyChart Information     [employer] gives you secure access to your electronic health record. If you see a primary care provider, you can also send messages to your care team and make appointments. If you have questions, please call your primary care clinic.  If you do not have a primary care provider, please call [phone] and they will assist you.        Care EveryWhere ID     This is your Care EveryWhere ID. This could be used by other organizations to access your [facility] medical records  [id number]        Your Vitals Were     Pulse Temperature Respirations Height Last Period Pulse Oximetry    80 98.2  F (36.8  C) (Oral) 16 5' 2\" (1.575 m) 04/06/2018 (Exact Date) 100%    Breastfeeding? BMI (Body Mass Index)                No 31.2 kg/m2           Blood Pressure from Last 3 Encounters:   04/17/18 112/74   11/07/17 107/70   11/03/17 100/66    Weight from Last 3 Encounters:   04/17/18 170 lb 9.6 oz (77.4 kg)   11/07/17 170 lb (77.1 kg)   11/03/17 165 lb (74.8 kg)              We Performed the Following     CBC with platelets     Comprehensive metabolic panel (BMP + Alb, Alk Phos, ALT, AST, Total. Bili, TP)     Hemoglobin A1c     Lipid panel reflex to direct LDL Fasting     TSH with free T4 reflex          Today's Medication Changes          These changes are accurate as of 4/17/18  7:54 AM.  If you have any questions, ask your nurse or doctor.               Start taking these medicines.        Dose/Directions    sertraline 50 MG tablet   Commonly known as:  ZOLOFT   Used for:  Adjustment disorder with mixed anxiety and depressed mood   Started by:  Anita Quiros PA-C        Take 1/2 tablet (25 mg) for 1-2 weeks, then increase to 1 tablet orally daily   Quantity:  30 tablet   Refills:  0         Stop taking these medicines if you haven't already. Please contact your care team if you have questions.     FLUoxetine 10 MG capsule   Commonly known as:  PROzac "   Stopped by:  Anita Quiros PA-C                Where to get your medicines      These medications were sent to [facility]/pharmacy #[id number] - [place], [place] - [address]  [address] [facility] [address]     Phone:  [phone]     sertraline 50 MG tablet                Primary Care Provider Office Phone # Fax #    Anita Quiros PA-C [phone]       [address] [address] [facility] [address]        Equal Access to Services     [facility]: Hadii aad ku hadasho Soomaali, waaxda luqadaha, qaybta kaalmada adeegyada, waxay idiin hayaan adeeg kharash baldo . So [facility] [phone].    ATENCIÓN: Si habla español, tiene a collins disposición servicios gratuitos de asistencia lingüística. [facility] [phone].    We comply with applicable federal civil rights laws and [place] laws. We do not discriminate on the basis of race, color, national origin, age, disability, sex, sexual orientation, or gender identity.            Thank you!     Thank you for choosing [facility]  for your care. Our goal is always to provide you with excellent care. Hearing back from our patients is one way we can continue to improve our services. Please take a few minutes to complete the written survey that you may receive in the mail after your visit with us. Thank you!             Your Updated Medication List - Protect others around you: Learn how to safely use, store and throw away your medicines at www.disposemymeds.org.          This list is accurate as of 4/17/18  7:54 AM.  Always use your most recent med list.                   Brand Name Dispense Instructions for use Diagnosis    acetaminophen 325 MG tablet    TYLENOL    100 tablet    Take 2 tablets (650 mg) by mouth every 4 hours as needed for other (mild pain)    Post-operative state       cetirizine 10 MG tablet    zyrTEC     Take 10 mg by mouth daily        D 2000 2000 units tablet   Generic drug:  cholecalciferol      Take 2,000  Units by mouth        ibuprofen 600 MG tablet    ADVIL/MOTRIN    30 tablet    Take 1 tablet (600 mg) by mouth every 6 hours as needed for pain (mild)    Post-operative state       levothyroxine 25 MCG tablet    SYNTHROID/LEVOTHROID    90 tablet    TAKE 1 TABLET BY MOUTH  DAILY    Hypothyroidism, unspecified type       melatonin 3 MG tablet     30 tablet    Take 1.5 tablets (4.5 mg) by mouth nightly as needed for sleep    Insomnia, unspecified type       MULTI-VITAMINS Tabs      Take 1 tablet by mouth        oxyCODONE IR 5 MG tablet    ROXICODONE    10 tablet    Take 1-2 tablets (5-10 mg) by mouth every 3 hours as needed for pain or other (Moderate to Severe)    Post-operative state       sertraline 50 MG tablet    ZOLOFT    30 tablet    Take 1/2 tablet (25 mg) for 1-2 weeks, then increase to 1 tablet orally daily    Adjustment disorder with mixed anxiety and depressed mood

## 2018-04-17 NOTE — PROGRESS NOTES
SUBJECTIVE:   Sha Vidal is a 48 year old female who presents to clinic today for the following health issues:    1. Biometric screening forms without physical  Patient is fasting  Patient is requesting updated labs for the forms for her work today      Medication Followup of Prozac    Taking Medication as prescribed: yes    Side Effects:  Hives, constipation, fatigue    Medication Helping Symptoms:  Patient thinks yes at first but not anymore   Patient is also concerned about persistent depression  Notes that she has had some hives, constipation and fatigue  She believes this is due to Prozac  Increased fatigue, just no motivation to do anything  Admits to trouble with insomnia, has trouble staying asleep  No snoring, no need to sleep during the day    Problem list and histories reviewed & adjusted, as indicated.  Additional history: as documented    Patient Active Problem List   Diagnosis     Hypothyroidism     CARDIOVASCULAR SCREENING; LDL GOAL LESS THAN 160     Migraine headache     Overweight     Vitiligo     Supraumbilical hernia     Uterine leiomyoma, unspecified location     Adjustment disorder with mixed anxiety and depressed mood     Abnormal uterine bleeding     Pain in both feet     Past Surgical History:   Procedure Laterality Date     ABDOMEN SURGERY      10/2007     APPENDECTOMY      10/2007     C NONSPECIFIC PROCEDURE      wisdom teeth extraction     C NONSPECIFIC PROCEDURE      Bilateral tubal ligation     C NONSPECIFIC PROCEDURE  1/01    Lasik     C NONSPECIFIC PROCEDURE      explo lap for ARMANDO for SBO     DAVINCI HERNIORRHAPHY VENTRAL N/A 8/15/2017    Procedure: DAVINCI HERNIORRHAPHY VENTRAL;  Robotic assisted incisional and supra-umbilical  hernia repair with mesh;  Surgeon: Damon Hall MD;  Location: [facility] OR     DILATION AND CURETTAGE, HYSTEROSCOPY DIAGNOSTIC, COMBINED N/A 11/3/2017    Procedure: COMBINED DILATION AND CURETTAGE, HYSTEROSCOPY DIAGNOSTIC;  Exam Under Anesthesia,  Diagnosistic Hysteroscopy, dilation and curettage, diagnostic laparoscopy and lysis of adhesions;  Surgeon: Neptali Fournier MD;  Location: [facility] OR     LAPAROSCOPY DIAGNOSTIC (GYN) N/A 11/3/2017    Procedure: LAPAROSCOPY DIAGNOSTIC (GYN);  diagnostic laparoscopy, lysis of adhesions;  Surgeon: Neptali Fournier MD;  Location: [facility] OR       Social History   Substance Use Topics     Smoking status: Never Smoker     Smokeless tobacco: Never Used     Alcohol use 0.0 oz/week     0 Standard drinks or equivalent per week      Comment: occasionally     Family History   Problem Relation Age of Onset     DIABETES Father      type 2     Coronary Artery Disease Mother      DIABETES Maternal Grandmother          Current Outpatient Prescriptions   Medication Sig Dispense Refill     sertraline (ZOLOFT) 50 MG tablet Take 1/2 tablet (25 mg) for 1-2 weeks, then increase to 1 tablet orally daily 30 tablet 0     melatonin 3 MG tablet Take 1.5 tablets (4.5 mg) by mouth nightly as needed for sleep 30 tablet 0     levothyroxine (SYNTHROID/LEVOTHROID) 25 MCG tablet TAKE 1 TABLET BY MOUTH  DAILY 90 tablet 1     acetaminophen (TYLENOL) 325 MG tablet Take 2 tablets (650 mg) by mouth every 4 hours as needed for other (mild pain) 100 tablet 0     ibuprofen (ADVIL/MOTRIN) 600 MG tablet Take 1 tablet (600 mg) by mouth every 6 hours as needed for pain (mild) 30 tablet 0     oxyCODONE IR (ROXICODONE) 5 MG tablet Take 1-2 tablets (5-10 mg) by mouth every 3 hours as needed for pain or other (Moderate to Severe) 10 tablet 0     cetirizine (ZYRTEC) 10 MG tablet Take 10 mg by mouth daily       cholecalciferol (D 2000) 2000 UNITS tablet Take 2,000 Units by mouth       Multiple Vitamin (MULTI-VITAMINS) TABS Take 1 tablet by mouth       Allergies   Allergen Reactions     No Known Drug Allergies        Reviewed and updated as needed this visit by clinical staff  Tobacco  Allergies  Meds  Med Hx  Surg Hx  Fam Hx  Soc Hx      Reviewed and updated as needed this  "visit by Provider         ROS:  Constitutional, HEENT, cardiovascular, pulmonary, gi and gu systems are negative, except as otherwise noted.    OBJECTIVE:     /74 (BP Location: Right arm, Patient Position: Chair, Cuff Size: Adult Regular)  Pulse 80  Temp 98.2  F (36.8  C) (Oral)  Resp 16  Ht 5' 2\" (1.575 m)  Wt 170 lb 9.6 oz (77.4 kg)  LMP 04/06/2018 (Exact Date)  SpO2 100%  Breastfeeding? No  BMI 31.2 kg/m2  Body mass index is 31.2 kg/(m^2).  GENERAL: healthy, alert and no distress  NECK: no adenopathy, no asymmetry, masses, or scars and thyroid normal to palpation  RESP: lungs clear to auscultation - no rales, rhonchi or wheezes  CV: regular rate and rhythm, normal S1 S2, no S3 or S4, no murmur, click or rub, no peripheral edema and peripheral pulses strong  MS: no gross musculoskeletal defects noted, no edema    Diagnostic Test Results:  Results for orders placed or performed in visit on 04/17/18 (from the past 24 hour(s))   CBC with platelets   Result Value Ref Range    WBC 5.8 4.0 - 11.0 10e9/L    RBC Count 4.05 3.8 - 5.2 10e12/L    Hemoglobin 8.5 (L) 11.7 - 15.7 g/dL    Hematocrit 28.2 (L) 35.0 - 47.0 %    MCV 70 (L) 78 - 100 fl    MCH 21.0 (L) 26.5 - 33.0 pg    MCHC 30.1 (L) 31.5 - 36.5 g/dL    RDW 16.5 (H) 10.0 - 15.0 %    Platelet Count 452 (H) 150 - 450 10e9/L   Hemoglobin A1c   Result Value Ref Range    Hemoglobin A1C 5.4 0 - 5.6 %       ASSESSMENT/PLAN:             1. Encounter for completion of form with patient  Updated labs today for form.  - CBC with platelets  - Comprehensive metabolic panel (BMP + Alb, Alk Phos, ALT, AST, Total. Bili, TP)  - TSH with free T4 reflex  - Lipid panel reflex to direct LDL Fasting  - Hemoglobin A1c    2. Adjustment disorder with mixed anxiety and depressed mood  Chronic issue, seems to be more depressed than normal.  Update PHQ9/GAD7 today.  Will have her d/c the Prozac and start on Zoloft instead.  Recheck in 1 month.  No SI/HI.  Risks, benefits and " alternatives were discussed with patient. Agreeable to the plan of care.  - CBC with platelets  - TSH with free T4 reflex  - sertraline (ZOLOFT) 50 MG tablet; Take 1/2 tablet (25 mg) for 1-2 weeks, then increase to 1 tablet orally daily  Dispense: 30 tablet; Refill: 0    3. Other fatigue  Chronic issue, did obtain CBC today which showed significant drop in hemoglobin.  Will update some labs and check Celiac panel as well.  - Tissue transglutaminase mitesh IgA and IgG    4. Screening for breast cancer  - *MA Screening Digital Bilateral; Future    5. Screening for diabetes mellitus  - Comprehensive metabolic panel (BMP + Alb, Alk Phos, ALT, AST, Total. Bili, TP)  - Hemoglobin A1c    6. Lipid screening  - Lipid panel reflex to direct LDL Fasting    7. Insomnia, unspecified type  - CBC with platelets  - melatonin 3 MG tablet; Take 1.5 tablets (4.5 mg) by mouth nightly as needed for sleep  Dispense: 30 tablet; Refill: 0    8. Low hemoglobin  New problem, unclear cause. Initially low hemoglobin thought to be related to heavy menses, had D/C done in Oct 2017, which improved menses. Is not regularly taking iron.  Recommend EGD/Colonoscopy- referral placed.  Updated labs today including ferritin, TIBC and Celiac panel.  CBC repeat in 2 weeks.  Encouraged iron BID.  If symptoms worsen or do not improve, consider hematology referral.  - GASTROENTEROLOGY ADULT REF PROCEDURE ONLY Edith Alesha [phone]; [facility]  - **CBC with platelets FUTURE 14d; Future  - Ferritin  - Iron and iron binding capacity    Risks, benefits and alternatives were discussed with patient. Agreeable to the plan of care.      Anita Quiros PA-C  [facility]

## 2018-04-17 NOTE — LETTER
My Depression Action Plan  Name: Sha Vidal   Date of Birth 1970  Date: 4/17/2018    My doctor: Anita Quiros   My clinic: [facility]  [address] [address] [phone]          GREEN    ZONE   Good Control    What it looks like:     Things are going generally well. You have normal up s and down s. You may even feel depressed from time to time, but bad moods usually last less than a day.   What you need to do:  1. Continue to care for yourself (see self care plan)  2. Check your depression survival kit and update it as needed  3. Follow your physician s recommendations including any medication.  4. Do not stop taking medication unless you consult with your physician first.           YELLOW         ZONE Getting Worse    What it looks like:     Depression is starting to interfere with your life.     It may be hard to get out of bed; you may be starting to isolate yourself from others.    Symptoms of depression are starting to last most all day and this has happened for several days.     You may have suicidal thoughts but they are not constant.   What you need to do:     1. Call your care team, your response to treatment will improve if you keep your care team informed of your progress. Yellow periods are signs an adjustment may need to be made.     2. Continue your self-care, even if you have to fake it!    3. Talk to someone in your support network    4. Open up your depression survival kit           RED    ZONE Medical Alert - Get Help    What it looks like:     Depression is seriously interfering with your life.     You may experience these or other symptoms: You can t get out of bed most days, can t work or engage in other necessary activities, you have trouble taking care of basic hygiene, or basic responsibilities, thoughts of suicide or death that will not go away, self-injurious behavior.     What you need to do:  1. Call your care  team and request a same-day appointment. If they are not available (weekends or after hours) call your local crisis line, emergency room or 911.            Depression Self Care Plan / Survival Kit    Self-Care for Depression  Here s the deal. Your body and mind are really not as separate as most people think.  What you do and think affects how you feel and how you feel influences what you do and think. This means if you do things that people who feel good do, it will help you feel better.  Sometimes this is all it takes.  There is also a place for medication and therapy depending on how severe your depression is, so be sure to consult with your medical provider and/ or Behavioral Health Consultant if your symptoms are worsening or not improving.     In order to better manage my stress, I will:    Exercise  Get some form of exercise, every day. This will help reduce pain and release endorphins, the  feel good  chemicals in your brain. This is almost as good as taking antidepressants!  This is not the same as joining a gym and then never going! (they count on that by the way ) It can be as simple as just going for a walk or doing some gardening, anything that will get you moving.      Hygiene   Maintain good hygiene (Get out of bed in the morning, Make your bed, Brush your teeth, Take a shower, and Get dressed like you were going to work, even if you are unemployed).  If your clothes don't fit try to get ones that do.    Diet  I will strive to eat foods that are good for me, drink plenty of water, and avoid excessive sugar, caffeine, alcohol, and other mood-altering substances.  Some foods that are helpful in depression are: complex carbohydrates, B vitamins, flaxseed, fish or fish oil, fresh fruits and vegetables.    Psychotherapy  I agree to participate in Individual Therapy (if recommended).    Medication  If prescribed medications, I agree to take them.  Missing doses can result in serious side effects.  I  understand that drinking alcohol, or other illicit drug use, may cause potential side effects.  I will not stop my medication abruptly without first discussing it with my provider.    Staying Connected With Others  I will stay in touch with my friends, family members, and my primary care provider/team.    Use your imagination  Be creative.  We all have a creative side; it doesn t matter if it s oil painting, sand castles, or mud pies! This will also kick up the endorphins.    Witness Beauty  (AKA stop and smell the roses) Take a look outside, even in mid-winter. Notice colors, textures. Watch the squirrels and birds.     Service to others  Be of service to others.  There is always someone else in need.  By helping others we can  get out of ourselves  and remember the really important things.  This also provides opportunities for practicing all the other parts of the program.    Humor  Laugh and be silly!  Adjust your TV habits for less news and crime-drama and more comedy.    Control your stress  Try breathing deep, massage therapy, biofeedback, and meditation. Find time to relax each day.     My support system    Clinic Contact:  Phone number:    Contact 1:  Phone number:    Contact 2:  Phone number:    Druze/:  Phone number:    Therapist:  Phone number:    Local crisis center:    Phone number:    Other community support:  Phone number:

## 2018-04-18 LAB
ALBUMIN SERPL-MCNC: 3.7 G/DL (ref 3.4–5)
ALP SERPL-CCNC: 64 U/L (ref 40–150)
ALT SERPL W P-5'-P-CCNC: 15 U/L (ref 0–50)
ANION GAP SERPL CALCULATED.3IONS-SCNC: 7 MMOL/L (ref 3–14)
AST SERPL W P-5'-P-CCNC: 17 U/L (ref 0–45)
BILIRUB SERPL-MCNC: 0.2 MG/DL (ref 0.2–1.3)
BUN SERPL-MCNC: 14 MG/DL (ref 7–30)
CALCIUM SERPL-MCNC: 8.4 MG/DL (ref 8.5–10.1)
CHLORIDE SERPL-SCNC: 109 MMOL/L (ref 94–109)
CHOLEST SERPL-MCNC: 173 MG/DL
CO2 SERPL-SCNC: 25 MMOL/L (ref 20–32)
CREAT SERPL-MCNC: 0.71 MG/DL (ref 0.52–1.04)
GFR SERPL CREATININE-BSD FRML MDRD: 87 ML/MIN/1.7M2
GLUCOSE SERPL-MCNC: 87 MG/DL (ref 70–99)
HDLC SERPL-MCNC: 60 MG/DL
LDLC SERPL CALC-MCNC: 103 MG/DL
NONHDLC SERPL-MCNC: 113 MG/DL
POTASSIUM SERPL-SCNC: 4.3 MMOL/L (ref 3.4–5.3)
PROT SERPL-MCNC: 7.1 G/DL (ref 6.8–8.8)
SODIUM SERPL-SCNC: 141 MMOL/L (ref 133–144)
TRIGL SERPL-MCNC: 52 MG/DL
TSH SERPL DL<=0.005 MIU/L-ACNC: 3.06 MU/L (ref 0.4–4)

## 2018-04-18 ASSESSMENT — ANXIETY QUESTIONNAIRES: GAD7 TOTAL SCORE: 3

## 2018-04-18 ASSESSMENT — PATIENT HEALTH QUESTIONNAIRE - PHQ9: SUM OF ALL RESPONSES TO PHQ QUESTIONS 1-9: 7

## 2018-04-19 LAB
TTG IGA SER-ACNC: 1 U/ML
TTG IGG SER-ACNC: 1 U/ML

## 2018-05-01 DIAGNOSIS — D64.9 LOW HEMOGLOBIN: ICD-10-CM

## 2018-05-01 LAB
ERYTHROCYTE [DISTWIDTH] IN BLOOD BY AUTOMATED COUNT: ABNORMAL % (ref 10–15)
HCT VFR BLD AUTO: 34.2 % (ref 35–47)
HGB BLD-MCNC: 10.5 G/DL (ref 11.7–15.7)
MCH RBC QN AUTO: 22.9 PG (ref 26.5–33)
MCHC RBC AUTO-ENTMCNC: 30.7 G/DL (ref 31.5–36.5)
MCV RBC AUTO: 75 FL (ref 78–100)
PLATELET # BLD AUTO: 398 10E9/L (ref 150–450)
RBC # BLD AUTO: 4.58 10E12/L (ref 3.8–5.2)
WBC # BLD AUTO: 8.6 10E9/L (ref 4–11)

## 2018-05-01 PROCEDURE — 36415 COLL VENOUS BLD VENIPUNCTURE: CPT | Performed by: PHYSICIAN ASSISTANT

## 2018-05-01 PROCEDURE — 85027 COMPLETE CBC AUTOMATED: CPT | Performed by: PHYSICIAN ASSISTANT

## 2018-05-13 DIAGNOSIS — F43.23 ADJUSTMENT DISORDER WITH MIXED ANXIETY AND DEPRESSED MOOD: ICD-10-CM

## 2018-05-13 NOTE — TELEPHONE ENCOUNTER
"Requested Prescriptions   Pending Prescriptions Disp Refills     sertraline (ZOLOFT) 50 MG tablet [Pharmacy Med Name: SERTRALINE HCL 50 MG TABLET]  Last Written Prescription Date:  04/17/2018  Last Fill Quantity: 30 tablet,  # refills: 0   Last office visit: 4/17/2018 with prescribing provider:  Anita Quiros PA-C    Future Office Visit:     30 tablet 0     Sig: TAKE 1/2 TABLET (25 MG) FOR 1-2 WEEKS, THEN INCREASE TO 1 TABLET ORALLY DAILY    SSRIs Protocol Passed    5/13/2018  1:33 PM  PHQ-9 SCORE 7/10/2015 7/29/2016 4/17/2018   Total Score 2 - -   Total Score - 2 7     ROSA-7 SCORE 7/10/2015 7/29/2016 4/17/2018   Total Score 0 - -   Total Score - 1 3              Passed - Recent (12 mo) or future (30 days) visit within the authorizing provider's specialty    Patient had office visit in the last 12 months or has a visit in the next 30 days with authorizing provider or within the authorizing provider's specialty.  See \"Patient Info\" tab in inbasket, or \"Choose Columns\" in Meds & Orders section of the refill encounter.           Passed - Patient is age 18 or older       Passed - No active pregnancy on record       Passed - No positive pregnancy test in last 12 months          "

## 2018-05-15 NOTE — TELEPHONE ENCOUNTER
Routing refill request to provider for review/approval because:  Patient needs to be seen because:  Due for medication follow up. PHQ-9 >5    Ailin NAVAS RN, BSN, PHN  [place] [facility] RN

## 2018-05-16 ENCOUNTER — MYC MEDICAL ADVICE (OUTPATIENT)
Dept: FAMILY MEDICINE | Facility: CLINIC | Age: 48
End: 2018-05-16

## 2018-05-16 NOTE — TELEPHONE ENCOUNTER
Sent PHQ and ROSA through [employer]; will await response.  Alexei Louise CMA ([facility])

## 2018-05-16 NOTE — TELEPHONE ENCOUNTER
Please update PHQ9/GAD7 and see how feeling, does she want to stay on current dose or increase?    Anita Quiros PA-C

## 2018-05-22 ASSESSMENT — ANXIETY QUESTIONNAIRES
IF YOU CHECKED OFF ANY PROBLEMS ON THIS QUESTIONNAIRE, HOW DIFFICULT HAVE THESE PROBLEMS MADE IT FOR YOU TO DO YOUR WORK, TAKE CARE OF THINGS AT HOME, OR GET ALONG WITH OTHER PEOPLE: NOT DIFFICULT AT ALL
7. FEELING AFRAID AS IF SOMETHING AWFUL MIGHT HAPPEN: NOT AT ALL
GAD7 TOTAL SCORE: 1
1. FEELING NERVOUS, ANXIOUS, OR ON EDGE: NOT AT ALL
6. BECOMING EASILY ANNOYED OR IRRITABLE: SEVERAL DAYS
3. WORRYING TOO MUCH ABOUT DIFFERENT THINGS: NOT AT ALL
2. NOT BEING ABLE TO STOP OR CONTROL WORRYING: NOT AT ALL
5. BEING SO RESTLESS THAT IT IS HARD TO SIT STILL: NOT AT ALL

## 2018-05-22 ASSESSMENT — PATIENT HEALTH QUESTIONNAIRE - PHQ9: 5. POOR APPETITE OR OVEREATING: NOT AT ALL

## 2018-05-22 NOTE — TELEPHONE ENCOUNTER
Pt calls back.  Updated phq9 and ROSA 7.      She is still tired, she says her hemoglobin is low.  She is feeling ok otherwise.  The current dose is fine.      Will forward to pcp.

## 2018-05-23 ASSESSMENT — PATIENT HEALTH QUESTIONNAIRE - PHQ9: SUM OF ALL RESPONSES TO PHQ QUESTIONS 1-9: 2

## 2018-05-23 ASSESSMENT — ANXIETY QUESTIONNAIRES: GAD7 TOTAL SCORE: 1

## 2018-06-06 DIAGNOSIS — R53.83 OTHER FATIGUE: ICD-10-CM

## 2018-06-06 DIAGNOSIS — D64.9 LOW HEMOGLOBIN: ICD-10-CM

## 2018-06-06 LAB
ERYTHROCYTE [DISTWIDTH] IN BLOOD BY AUTOMATED COUNT: NORMAL % (ref 10–15)
HCT VFR BLD AUTO: 37.1 % (ref 35–47)
HGB BLD-MCNC: 11.8 G/DL (ref 11.7–15.7)
MCH RBC QN AUTO: 26.9 PG (ref 26.5–33)
MCHC RBC AUTO-ENTMCNC: 31.8 G/DL (ref 31.5–36.5)
MCV RBC AUTO: 85 FL (ref 78–100)
PLATELET # BLD AUTO: 313 10E9/L (ref 150–450)
RBC # BLD AUTO: 4.39 10E12/L (ref 3.8–5.2)
WBC # BLD AUTO: 9.5 10E9/L (ref 4–11)

## 2018-06-06 PROCEDURE — 36415 COLL VENOUS BLD VENIPUNCTURE: CPT | Performed by: PHYSICIAN ASSISTANT

## 2018-06-06 PROCEDURE — 85027 COMPLETE CBC AUTOMATED: CPT | Performed by: PHYSICIAN ASSISTANT

## 2018-06-20 DIAGNOSIS — F43.23 ADJUSTMENT DISORDER WITH MIXED ANXIETY AND DEPRESSED MOOD: ICD-10-CM

## 2018-06-20 NOTE — TELEPHONE ENCOUNTER
"Requested Prescriptions   Pending Prescriptions Disp Refills     sertraline (ZOLOFT) 50 MG tablet [Pharmacy Med Name: SERTRALINE HCL 50 MG TABLET]    Last Written Prescription Date:  5/22/2018  Last Fill Quantity: 30,  # refills: 0   Last office visit: 4/17/2018 with prescribing provider:  Anita Quiros     Future Office Visit:     30 tablet 0     Sig: TAKE 1/2 TABLET (25 MG) FOR 1-2 WEEKS, THEN INCREASE TO 1 TABLET ORALLY DAILY    SSRIs Protocol Passed    6/20/2018  1:20 AM       Passed - Recent (12 mo) or future (30 days) visit within the authorizing provider's specialty    Patient had office visit in the last 12 months or has a visit in the next 30 days with authorizing provider or within the authorizing provider's specialty.  See \"Patient Info\" tab in inbasket, or \"Choose Columns\" in Meds & Orders section of the refill encounter.           Passed - Patient is age 18 or older       Passed - No active pregnancy on record       Passed - No positive pregnancy test in last 12 months          "

## 2018-06-21 NOTE — TELEPHONE ENCOUNTER
PHQ-9 score:    PHQ-9 SCORE 5/22/2018   Total Score -   Total Score 2       Prescription approved per [facility] Refill Protocol.    Ailin NAVAS RN, BSN, PHN  [facility] Flex RN

## 2018-06-24 DIAGNOSIS — E03.9 HYPOTHYROIDISM, UNSPECIFIED TYPE: ICD-10-CM

## 2018-06-26 RX ORDER — LEVOTHYROXINE SODIUM 25 UG/1
TABLET ORAL
Qty: 90 TABLET | Refills: 2 | Status: SHIPPED | OUTPATIENT
Start: 2018-06-26 | End: 2019-02-24

## 2018-06-26 NOTE — TELEPHONE ENCOUNTER
"Requested Prescriptions   Pending Prescriptions Disp Refills     levothyroxine (SYNTHROID/LEVOTHROID) 25 MCG tablet [Pharmacy Med Name: LEVOTHYROXINE  0.025MG  TAB]  Last Written Prescription Date:  12/19/17  Last Fill Quantity: 90 TABLET,  # refills: 1   Last office visit: 4/17/2018 with prescribing provider:  TAMARA   Future Office Visit:     90 tablet      Sig: TAKE 1 TABLET BY MOUTH  DAILY    Thyroid Protocol Passed    6/24/2018  8:24 PM       Passed - Patient is 12 years or older       Passed - Recent (12 mo) or future (30 days) visit within the authorizing provider's specialty    Patient had office visit in the last 12 months or has a visit in the next 30 days with authorizing provider or within the authorizing provider's specialty.  See \"Patient Info\" tab in inbasket, or \"Choose Columns\" in Meds & Orders section of the refill encounter.           Passed - Normal TSH on file in past 12 months    Recent Labs   Lab Test  04/17/18   0755   TSH  3.06             Passed - No active pregnancy on record    If patient is pregnant or has had a positive pregnancy test, please check TSH.         Passed - No positive pregnancy test in past 12 months    If patient is pregnant or has had a positive pregnancy test, please check TSH.            "

## 2018-06-26 NOTE — TELEPHONE ENCOUNTER
Prescription approved per [facility] Refill Protocol.    Ailin NAVAS RN, BSN, PHN  [place] Flex RN

## 2018-06-28 DIAGNOSIS — F43.23 ADJUSTMENT DISORDER WITH MIXED ANXIETY AND DEPRESSED MOOD: ICD-10-CM

## 2018-06-28 NOTE — TELEPHONE ENCOUNTER
"DIFFERENT PHARMACY?     Requested Prescriptions   Pending Prescriptions Disp Refills     sertraline (ZOLOFT) 50 MG tablet  Last Written Prescription Date:  6/21/18  Last Fill Quantity: 30 TABLET,  # refills: 3   Last office visit: 4/17/2018 with prescribing provider:  TAMARA   Future Office Visit:     30 tablet 3     Sig: Take 1 tablet (50 mg) by mouth daily    SSRIs Protocol Passed    6/28/2018 12:51 PM  PHQ-9 SCORE 7/29/2016 4/17/2018 5/22/2018   Total Score - - -   Total Score 2 7 2     ROSA-7 SCORE 7/29/2016 4/17/2018 5/22/2018   Total Score - - -   Total Score 1 3 1              Passed - Recent (12 mo) or future (30 days) visit within the authorizing provider's specialty    Patient had office visit in the last 12 months or has a visit in the next 30 days with authorizing provider or within the authorizing provider's specialty.  See \"Patient Info\" tab in inbasket, or \"Choose Columns\" in Meds & Orders section of the refill encounter.           Passed - Patient is age 18 or older       Passed - No active pregnancy on record       Passed - No positive pregnancy test in last 12 months          "

## 2018-06-28 NOTE — TELEPHONE ENCOUNTER
Rx sent to mail order.     Prescription approved per [facility] Refill Protocol.    Ailin NAVAS RN, BSN, PHN  [place] Flex RN

## 2018-10-04 ENCOUNTER — OFFICE VISIT (OUTPATIENT)
Dept: OBGYN | Facility: CLINIC | Age: 48
End: 2018-10-04
Payer: COMMERCIAL

## 2018-10-04 VITALS — SYSTOLIC BLOOD PRESSURE: 102 MMHG | WEIGHT: 179 LBS | BODY MASS INDEX: 32.74 KG/M2 | DIASTOLIC BLOOD PRESSURE: 76 MMHG

## 2018-10-04 DIAGNOSIS — N92.0 MENORRHAGIA WITH REGULAR CYCLE: ICD-10-CM

## 2018-10-04 DIAGNOSIS — D25.2 INTRAMURAL AND SUBSEROUS LEIOMYOMA OF UTERUS: ICD-10-CM

## 2018-10-04 DIAGNOSIS — N93.9 ABNORMAL UTERINE BLEEDING (AUB): ICD-10-CM

## 2018-10-04 DIAGNOSIS — D25.1 INTRAMURAL AND SUBSEROUS LEIOMYOMA OF UTERUS: ICD-10-CM

## 2018-10-04 DIAGNOSIS — Z23 NEED FOR PROPHYLACTIC VACCINATION AND INOCULATION AGAINST INFLUENZA: Primary | ICD-10-CM

## 2018-10-04 LAB
ERYTHROCYTE [DISTWIDTH] IN BLOOD BY AUTOMATED COUNT: 14.7 % (ref 10–15)
HCT VFR BLD AUTO: 36.1 % (ref 35–47)
HGB BLD-MCNC: 11.3 G/DL (ref 11.7–15.7)
MCH RBC QN AUTO: 27 PG (ref 26.5–33)
MCHC RBC AUTO-ENTMCNC: 31.3 G/DL (ref 31.5–36.5)
MCV RBC AUTO: 86 FL (ref 78–100)
PLATELET # BLD AUTO: 367 10E9/L (ref 150–450)
RBC # BLD AUTO: 4.19 10E12/L (ref 3.8–5.2)
WBC # BLD AUTO: 8.3 10E9/L (ref 4–11)

## 2018-10-04 PROCEDURE — 99243 OFF/OP CNSLTJ NEW/EST LOW 30: CPT | Mod: 25 | Performed by: OBSTETRICS & GYNECOLOGY

## 2018-10-04 PROCEDURE — 85027 COMPLETE CBC AUTOMATED: CPT | Performed by: OBSTETRICS & GYNECOLOGY

## 2018-10-04 PROCEDURE — 90686 IIV4 VACC NO PRSV 0.5 ML IM: CPT | Performed by: OBSTETRICS & GYNECOLOGY

## 2018-10-04 PROCEDURE — 36415 COLL VENOUS BLD VENIPUNCTURE: CPT | Performed by: OBSTETRICS & GYNECOLOGY

## 2018-10-04 PROCEDURE — 90471 IMMUNIZATION ADMIN: CPT | Performed by: OBSTETRICS & GYNECOLOGY

## 2018-10-04 NOTE — PROGRESS NOTES
HPI:  Sha Vidal is a 48 year old female  Patient's last menstrual period was 2018 (exact date). TL for contraception, who I am asked to see by Anita ONEAL  presents for evaluation of menorrhagia with associated anemia  that is getting progressively worse.  The patient states that she has very regular 28-30-day cycles that they are now lasting 7 days and for the first 3 days of flow she will go through at minimum a super pad and tampon hourly.  After her menses she feels tired and fatigued.  In 2018 she was noted to be anemic with a hemoglobin of 8.5 With a hypochromic microcytic iron deficiency anemia.  Iron studies were performed confirming the diagnosis.  The patient has been treated and today feels better.  Most recent hemoglobin 2018 showed a hemoglobin of 11.8.   In 2017 the patient underwent an ultrasound exam for evaluation of menorrhagia with the following findings  Measurements:  Uterus: 9.2 x 4.9 x 6.2cm.     Position is anteverted.  Contour is irreg w myomata:   1) Mid 4.3 x 3.6 x 4.0cm.      Endo cav: 4 mm         Distorted  Cervix: Wnl, Nabothian Cyst noted       Right ovary: 2.6 x 1.2 x 1.6cm.   Wnl  Left ovary:   2.2 x 1.1 x 1.5 cm.  Complex cyst 1.3 x 1.1 x 1.3cm      Cul de sac: no free fluid       on November 3, 2017 the patient underwent a hysteroscopy attempted D&C diagnostic laparoscopy for menorrhagia.   Details from the hysteroscopy:  The endocervical canal is within normal limits.  The endometrial cavity sounded to 12 cm and with the hysteroscope contained multiple synechiae and a suggestion of an approximate 1.5 cm polypoid structure in the left lower uterine segment.  I was unable to clearly identify the fundus of the uterus, I was unable to clearly identify tubal ostia.  There was no obvious source of perforation hysteroscopically.  Because of a sudden fluid discrepancy concern was that potentially a perforation could be present.   At the time  of laparoscopy, the abdominal cavity was examined.  There was no source of perforation noted.  The patient was status post tubal sterilization procedure.  The right and left ovary were seen and appeared to be within normal limits.  I did not find any evidence of complex cyst on the left ovary.  There was extensive adhesive disease and tunneling in the cul-de-sac with tunneling present by the base of the right uterosacral ligament as well as the midportion of the left uterosacral ligament.  I did not see any excrescences active endometriosis or obvious signs of malignancy.  The left round ligament is within normal limits.  Anterior surface of the uterus and bladder flap are within normal limits.  Right round ligament is within normal limits.  The proximal fallopian tube on the right side was noted distal fimbriated end was noted with a sterilization procedure performed.  Right ovary is as noted.  Right lateral pelvic sidewalls, otherwise unremarkable.  Posterior aspect of the uterus showed no evidence of perforation.  The cul-de-sac,  was visualized and other than tunneling no active endometriosis or abnormalities were noted.  Rectum also appeared to be normal.  The left lateral sidewall other than tunneling in the region of the left uterosacral ligament was unremarkable.  There were thin omental adhesions to the anterior abdominal wall that Dr. Hall, a general surgeon who was consulted removed.  Since the time of the procedure her bleeding has become worse.  She has no history of any abnormal Paps no history of any Endocrinopathy.  She had normal thyroid function testing done in April 2018.  She denies any postcoital bleeding.  The patient does experience catamenial headaches     Past Medical History:   Diagnosis Date     Allergic rhinitis, cause unspecified      Bleeding disorder (H)     factor 12     CARDIOVASCULAR SCREENING; LDL GOAL LESS THAN 160 10/31/2010     Hashimoto's thyroiditis      Lateral  epicondylitis 3/13/2009     Migraine headache 2/19/2013     NONSPECIFIC MEDICAL HISTORY 2002    Factor XII deficiency (asymptomatic)     NONSPECIFIC MEDICAL HISTORY 2002    GRAYSON positive 1:320, seen by Rheum (negative PHUONG eval)     Past Surgical History:   Procedure Laterality Date     ABDOMEN SURGERY      10/2007     APPENDECTOMY      10/2007     C NONSPECIFIC PROCEDURE      wisdom teeth extraction     C NONSPECIFIC PROCEDURE      Bilateral tubal ligation     C NONSPECIFIC PROCEDURE  1/01    Lasik     C NONSPECIFIC PROCEDURE      explo lap for ARMANDO for SBO     DAVINCI HERNIORRHAPHY VENTRAL N/A 8/15/2017    Procedure: DAVINCI HERNIORRHAPHY VENTRAL;  Robotic assisted incisional and supra-umbilical  hernia repair with mesh;  Surgeon: Damon Hall MD;  Location: [facility] OR     DILATION AND CURETTAGE, HYSTEROSCOPY DIAGNOSTIC, COMBINED N/A 11/3/2017    Procedure: COMBINED DILATION AND CURETTAGE, HYSTEROSCOPY DIAGNOSTIC;  Exam Under Anesthesia, Diagnosistic Hysteroscopy, dilation and curettage, diagnostic laparoscopy and lysis of adhesions;  Surgeon: Neptali Fournier MD;  Location: [facility] OR     LAPAROSCOPY DIAGNOSTIC (GYN) N/A 11/3/2017    Procedure: LAPAROSCOPY DIAGNOSTIC (GYN);  diagnostic laparoscopy, lysis of adhesions;  Surgeon: Neptali Fournier MD;  Location: [facility] OR     Family History   Problem Relation Age of Onset     Diabetes Father      type 2     Coronary Artery Disease Mother      Diabetes Maternal Grandmother      Social History     Social History     Marital status:      Spouse name: N/A     Number of children: N/A     Years of education: N/A     Occupational History     IT dept at [employer]      Social History Main Topics     Smoking status: Never Smoker     Smokeless tobacco: Never Used     Alcohol use 0.0 oz/week     0 Standard drinks or equivalent per week      Comment: occasionally     Drug use: No     Sexual activity: Yes     Partners: Male     Birth control/ protection: Surgical     Other Topics  Concern     Parent/Sibling W/ Cabg, Mi Or Angioplasty Before 65f 55m? No      Service No     Blood Transfusions No     Caffeine Concern No     Occupational Exposure No     Hobby Hazards No     Sleep Concern Yes     Stress Concern No     Weight Concern Yes     Special Diet No     Back Care No     Exercise No     Bike Helmet No     Seat Belt No     Self-Exams No     Social History Narrative       Allergies:  No known drug allergies    Current Outpatient Prescriptions   Medication Sig Dispense Refill     acetaminophen (TYLENOL) 325 MG tablet Take 2 tablets (650 mg) by mouth every 4 hours as needed for other (mild pain) 100 tablet 0     cetirizine (ZYRTEC) 10 MG tablet Take 10 mg by mouth daily       cholecalciferol (D 2000) 2000 UNITS tablet Take 2,000 Units by mouth       levothyroxine (SYNTHROID/LEVOTHROID) 25 MCG tablet TAKE 1 TABLET BY MOUTH  DAILY 90 tablet 2     melatonin 3 MG tablet Take 1.5 tablets (4.5 mg) by mouth nightly as needed for sleep 30 tablet 0     Multiple Vitamin (MULTI-VITAMINS) TABS Take 1 tablet by mouth       oxyCODONE IR (ROXICODONE) 5 MG tablet Take 1-2 tablets (5-10 mg) by mouth every 3 hours as needed for pain or other (Moderate to Severe) 10 tablet 0     sertraline (ZOLOFT) 50 MG tablet Take 1 tablet (50 mg) by mouth daily 30 tablet 3     ibuprofen (ADVIL/MOTRIN) 600 MG tablet Take 1 tablet (600 mg) by mouth every 6 hours as needed for pain (mild) 30 tablet 0       Review Of Systems   ROS: 10 point ROS neg other than the symptoms noted above in the HPI.    Exam:  /76  Wt 179 lb (81.2 kg)  LMP 09/29/2018 (Exact Date)  BMI 32.74 kg/m2  {Constitutional: healthy, alert and no distress  Head: Normocephalic. No masses, lesions, tenderness or abnormalities  Neck: Neck supple. No adenopathy. Thyroid symmetric, normal size,, Carotids without bruits.  ENT: NEGATIVE for ear, mouth and throat problems  Cardiovascular: negative, PMI normal. No lifts, heaves, or thrills. RRR. No  murmurs, clicks gallops or rub  Respiratory: negative, Percussion normal. Good diaphragmatic excursion. Lungs clear  Gastrointestinal: Abdomen soft, non-tender. BS normal. No masses, organomegaly patient has mesh in the repair of a previously noted umbilical hernia  Genitourinary: Normal external genitalia without lesions and speculum multipara cervix no lesions seen bimanual exam the uterus feels parous mobile firm adnexa without masses enlargement or tenderness rectovaginal exam confirms  Musculoskeletalextremities normal- no gross deformities noted, gait normal and normal muscle tone    Assessment/Plan:  (Z23) Need for prophylactic vaccination and inoculation against influenza  (primary encounter diagnosis)  Comment: Patient requests flu shot  Plan: FLU VACCINE, SPLIT VIRUS, IM (QUADRIVALENT)         [70121]- >3 YRS        Done    (N93.9) Abnormal uterine bleeding (AUB)  Comment: Risks, benefits, and alternative modes of therapy discussed at length. Pathophysiology of the disease process reviewed, all of the patients questions answered and informed consent obtained.  We discussed benign versus malignant possibilities and the difficulty with the previous sampling procedure  Plan: US Pelvic Complete w Transvaginal, CBC with         platelets        Pulmonary recheck a pelvic ultrasound exam and CBC    (N92.0) Menorrhagia with regular cycle  Comment: Patient desires surgical extraptive will see the patient back after the ultrasound to review her hemoglobin treatment at this time   she is requesting robotic total laparoscopic hysterectomy bilateral salpingectomy with ovarian preservation  Plan: US Pelvic Complete w Transvaginal, CBC with         platelets        We will see the patient back after the ultrasound to review the lab data    (D25.1,  D25.2) Intramural and subserous leiomyoma of uterus  Comment: As above  Plan: US Pelvic Complete w Transvaginal, CBC with         platelets        We will schedule robotic  TLH bilateral salpingectomy in the interval.  She will see primary care for preop H&P.  We discussed a preop bowel prep with magnesium citrate.  Pre-and postop instructions reviewed risks benefits alternatives discussed at length all of her questions were answered informed consent was obtained.  A detailed written outline of our discussion was given      Neptali Fournier M.D.

## 2018-10-04 NOTE — MR AVS SNAPSHOT
After Visit Summary   10/4/2018    Sha Vidal    MRN: [id number]           Patient Information     Date Of Birth          1970        Visit Information        Provider Department      10/4/2018 1:30 PM Neptali Fournier MD [facility]        Today's Diagnoses     Need for prophylactic vaccination and inoculation against influenza    -  1    Abnormal uterine bleeding (AUB)        Menorrhagia with regular cycle        Intramural and subserous leiomyoma of uterus          Care Instructions    You can reach your [facility] Care Team any time of the day by calling [phone]. This number will put you in touch with the 24 hour nurse line if the clinic is closed.    To contact your OB/GYN Surgery Scheduler please call [phone]. This is a direct number for your care team between 8 a.m. and 4 p.m. Monday through Friday.    [facility] is open for your convenience: [phone]  Monday through Friday 8 a.m. to 8:30 p.m.  Saturday 9 a.m. to 6 p.m.  Sunday Noon to 6 p.m.    They are closed on all major holidays.            Follow-ups after your visit        Your next 10 appointments already scheduled     Oct 05, 2018  2:00 PM CDT   US PELVIC COMPLETE W TRANSVAGINAL with [id number]   [facility] ([facility])    [address] [place] [id number] [phone]           How do I prepare for my exam? (Food and drink instructions) Adults: Drink four 8-ounce glasses of fluid an hour before your exam. If you need to empty your bladder before your exam, try to release only a little urine. Then, drink another glass of fluid.  Children: * Children who are potty trained up to 6 years old should drink at least 2 cups (16 oz) of water/non-carbonated beverage 30 minutes prior to the exam. * Children who are 6-10 years should drink at least 3 cups (24 oz) of water/non-carbonated beverage 45 minutes prior to the exam. * Children who  are 10 years or older should drink at least 4 cups (32 oz) of water/non-carbonated beverage 45 minutes prior to the exam.  If your child is very uncomfortable or has an urgent need to pee, please notify a technologist; they will try to find out how much longer the wait may be and provide instructions to help relieve the pressure.  What should I wear: Wear comfortable clothes.  How long does the exam take: Most ultrasounds take 30 to 60 minutes.  What should I bring: Bring a list of your medicines, including vitamins, minerals and over-the-counter drugs. It is safest to leave personal items at home.  Do I need a :  No  is needed.  What do I need to tell my doctor: Tell your doctor about any allergies you may have.  What should I do after the exam: No restrictions, You may resume normal activities.  What is this test: An ultrasound uses sound waves to make pictures of the body. Sound waves do not cause pain. The only discomfort may be the pressure of the wand against your skin or full bladder.  Who should I call with questions: If you have any questions, please call the Imaging Department where you will have your exam. Directions, parking instructions, and other information is available on our website, [email or web address].              Future tests that were ordered for you today     Open Future Orders        Priority Expected Expires Ordered    US Pelvic Complete w Transvaginal Routine 10/4/2018 10/4/2019 10/4/2018            Who to contact     If you have questions or need follow up information about today's clinic visit or your schedule please contact [facility] directly at [phone].  Normal or non-critical lab and imaging results will be communicated to you by MyChart, letter or phone within 4 business days after the clinic has received the results. If you do not hear from us within 7 days, please contact the clinic through MyChart or phone. If you have a critical or  abnormal lab result, we will notify you by phone as soon as possible.  Submit refill requests through [employer] or call your pharmacy and they will forward the refill request to us. Please allow 3 business days for your refill to be completed.          Additional Information About Your Visit        [employer] Information     [employer] gives you secure access to your electronic health record. If you see a primary care provider, you can also send messages to your care team and make appointments. If you have questions, please call your primary care clinic.  If you do not have a primary care provider, please call [phone] and they will assist you.        Care EveryWhere ID     This is your Care EveryWhere ID. This could be used by other organizations to access your [facility] medical records  [id number]        Your Vitals Were     Last Period BMI (Body Mass Index)                09/29/2018 (Exact Date) 32.74 kg/m2           Blood Pressure from Last 3 Encounters:   10/04/18 102/76   04/17/18 112/74   11/07/17 107/70    Weight from Last 3 Encounters:   10/04/18 179 lb (81.2 kg)   04/17/18 170 lb 9.6 oz (77.4 kg)   11/07/17 170 lb (77.1 kg)              We Performed the Following     CBC with platelets     FLU VACCINE, SPLIT VIRUS, IM (QUADRIVALENT) [84189]- >3 YRS        Primary Care Provider Office Phone # Fax #    Anita Quiros PA-C [phone]       [address] [facility] [address]        Equal Access to Services     [facility]: Hadii aad ku hadasho Soomaali, waaxda luqadaha, qaybta kaalmada adeegyada, morales bland . So [facility] [phone].    ATENCIÓN: Si habla español, tiene a collins disposición servicios gratuitos de asistencia lingüística. Llame al [phone].    We comply with applicable federal civil rights laws and [place] laws. We do not discriminate on the basis of race, color, national origin, age, disability, sex, sexual orientation, or gender  identity.            Thank you!     Thank you for choosing [facility]  for your care. Our goal is always to provide you with excellent care. Hearing back from our patients is one way we can continue to improve our services. Please take a few minutes to complete the written survey that you may receive in the mail after your visit with us. Thank you!             Your Updated Medication List - Protect others around you: Learn how to safely use, store and throw away your medicines at www.disposemymeds.org.          This list is accurate as of 10/4/18  3:29 PM.  Always use your most recent med list.                   Brand Name Dispense Instructions for use Diagnosis    acetaminophen 325 MG tablet    TYLENOL    100 tablet    Take 2 tablets (650 mg) by mouth every 4 hours as needed for other (mild pain)    Post-operative state       cetirizine 10 MG tablet    zyrTEC     Take 10 mg by mouth daily        D 2000 2000 units tablet   Generic drug:  cholecalciferol      Take 2,000 Units by mouth        ibuprofen 600 MG tablet    ADVIL/MOTRIN    30 tablet    Take 1 tablet (600 mg) by mouth every 6 hours as needed for pain (mild)    Post-operative state       levothyroxine 25 MCG tablet    SYNTHROID/LEVOTHROID    90 tablet    TAKE 1 TABLET BY MOUTH  DAILY    Hypothyroidism, unspecified type       melatonin 3 MG tablet     30 tablet    Take 1.5 tablets (4.5 mg) by mouth nightly as needed for sleep    Insomnia, unspecified type       MULTI-VITAMINS Tabs      Take 1 tablet by mouth        oxyCODONE IR 5 MG tablet    ROXICODONE    10 tablet    Take 1-2 tablets (5-10 mg) by mouth every 3 hours as needed for pain or other (Moderate to Severe)    Post-operative state       sertraline 50 MG tablet    ZOLOFT    30 tablet    Take 1 tablet (50 mg) by mouth daily    Adjustment disorder with mixed anxiety and depressed mood

## 2018-10-04 NOTE — PROGRESS NOTES

## 2018-10-04 NOTE — PATIENT INSTRUCTIONS
You can reach your [facility] Care Team any time of the day by calling [phone]. This number will put you in touch with the 24 hour nurse line if the clinic is closed.    To contact your OB/GYN Surgery Scheduler please call [phone]. This is a direct number for your care team between 8 a.m. and 4 p.m. Monday through Friday.    [facility] is open for your convenience: [phone]  Monday through Friday 8 a.m. to 8:30 p.m.  Saturday 9 a.m. to 6 p.m.  Sunday Noon to 6 p.m.    They are closed on all major holidays.

## 2018-10-04 NOTE — NURSING NOTE
"Chief Complaint   Patient presents with     RECHECK       Initial /76  Wt 179 lb (81.2 kg)  LMP 2018 (Exact Date)  BMI 32.74 kg/m2 Estimated body mass index is 32.74 kg/(m^2) as calculated from the following:    Height as of 18: 5' 2\" (1.575 m).    Weight as of this encounter: 179 lb (81.2 kg).  BP completed using cuff size: regular        The following HM Due: NONE      The following patient reported/Care Every where data was sent to:  P ABSTRACT QUALITY INITIATIVES [18675]        Isa Peña CMA                 "

## 2018-10-05 ENCOUNTER — RADIANT APPOINTMENT (OUTPATIENT)
Dept: ULTRASOUND IMAGING | Facility: CLINIC | Age: 48
End: 2018-10-05
Attending: OBSTETRICS & GYNECOLOGY
Payer: COMMERCIAL

## 2018-10-05 DIAGNOSIS — N93.9 ABNORMAL UTERINE BLEEDING (AUB): ICD-10-CM

## 2018-10-05 DIAGNOSIS — D25.2 INTRAMURAL AND SUBSEROUS LEIOMYOMA OF UTERUS: ICD-10-CM

## 2018-10-05 DIAGNOSIS — D25.1 INTRAMURAL AND SUBSEROUS LEIOMYOMA OF UTERUS: ICD-10-CM

## 2018-10-05 DIAGNOSIS — N92.0 MENORRHAGIA WITH REGULAR CYCLE: ICD-10-CM

## 2018-10-05 PROCEDURE — 76830 TRANSVAGINAL US NON-OB: CPT | Performed by: OBSTETRICS & GYNECOLOGY

## 2018-10-05 PROCEDURE — 76856 US EXAM PELVIC COMPLETE: CPT | Performed by: OBSTETRICS & GYNECOLOGY

## 2018-10-09 ENCOUNTER — TELEPHONE (OUTPATIENT)
Dept: OBGYN | Facility: CLINIC | Age: 48
End: 2018-10-09

## 2018-10-09 NOTE — TELEPHONE ENCOUNTER
Type of surgery: tlh bilateral salpingectomy  Location of surgery: [facility]  Date and time of surgery: 11/20/18 @ 10:45 am  Surgeon: Dr. Fournier  Pre-Op Appt Date: Patient advised to schedule.  Post-Op Appt Date: 11/27/18   Packet sent out: Yes  Pre-cert/Authorization completed:  No  Date: 10/9/18

## 2018-10-09 NOTE — PROGRESS NOTES
Please notify pt of U/S showing an enlarged uterus with fibromata that effect the cavity  Pt expressed desire for surgical rx (TLH bilateral Salpingectomy)  I will send scheduling info to Katie GAVIRIA  I would like to see pt prior to surg and ask that she have a pre op h&P with PMD    Surgeon: Dr Neptali Fournier  Assist:  Yes  Location: [facility]  Date/time preference:  Pt convenience    Surgery:  Robotic TLH bilateral salpingectomy  Can be done laparoscopically if Robot unavailable  Length of Surgery:  2 hrs  Diagnosis:  AUB with enlarged fibroid uterus  Anesthesia type:  GENERAL    Special instructions / equipment:    Am admit or same day: SAME DAY  Bowel prep: Yes  Pre op: PCP  Office visit with surgeon prior to surgery: Yes  Schedule Post Op: 1-2 weeks

## 2018-10-09 NOTE — TELEPHONE ENCOUNTER
Surgeon: Dr Neptali Fournier   Assist:  Yes   Location: [facility]   Date/time preference:  Pt convenience     Surgery:  Robotic TLH bilateral salpingectomy  Can be done laparoscopically if Robot unavailable   Length of Surgery:  2 hrs   Diagnosis:  AUB with enlarged fibroid uterus   Anesthesia type:  GENERAL     Special instructions / equipment:     Am admit or same day: SAME DAY   Bowel prep: Yes   Pre op: PCP   Office visit with surgeon prior to surgery: Yes   Schedule Post Op: 1-2 weeks

## 2018-10-12 ENCOUNTER — HEALTH MAINTENANCE LETTER (OUTPATIENT)
Age: 48
End: 2018-10-12

## 2018-10-23 ENCOUNTER — OFFICE VISIT (OUTPATIENT)
Dept: FAMILY MEDICINE | Facility: CLINIC | Age: 48
End: 2018-10-23
Payer: COMMERCIAL

## 2018-10-23 VITALS
DIASTOLIC BLOOD PRESSURE: 80 MMHG | OXYGEN SATURATION: 98 % | RESPIRATION RATE: 16 BRPM | HEART RATE: 76 BPM | HEIGHT: 62 IN | WEIGHT: 179.5 LBS | TEMPERATURE: 98.2 F | BODY MASS INDEX: 33.03 KG/M2 | SYSTOLIC BLOOD PRESSURE: 114 MMHG

## 2018-10-23 DIAGNOSIS — N93.9 ABNORMAL UTERINE BLEEDING: ICD-10-CM

## 2018-10-23 DIAGNOSIS — F43.23 ADJUSTMENT DISORDER WITH MIXED ANXIETY AND DEPRESSED MOOD: ICD-10-CM

## 2018-10-23 DIAGNOSIS — D69.9 BLEEDING DISORDER (H): ICD-10-CM

## 2018-10-23 DIAGNOSIS — Z01.818 PREOP GENERAL PHYSICAL EXAM: Primary | ICD-10-CM

## 2018-10-23 LAB — HGB BLD-MCNC: 11 G/DL (ref 11.7–15.7)

## 2018-10-23 PROCEDURE — 99214 OFFICE O/P EST MOD 30 MIN: CPT | Performed by: PHYSICIAN ASSISTANT

## 2018-10-23 PROCEDURE — 93000 ELECTROCARDIOGRAM COMPLETE: CPT | Performed by: PHYSICIAN ASSISTANT

## 2018-10-23 PROCEDURE — 36415 COLL VENOUS BLD VENIPUNCTURE: CPT | Performed by: PHYSICIAN ASSISTANT

## 2018-10-23 PROCEDURE — 85018 HEMOGLOBIN: CPT | Performed by: PHYSICIAN ASSISTANT

## 2018-10-23 NOTE — PROGRESS NOTES
[facility]  [address] [address] [address] [id number] [phone]  Dept: [phone]    PRE-OP EVALUATION:  Today's date: 10/23/2018    Sha Vidal (: 1970) presents for pre-operative evaluation assessment as requested by Dr. Neptali Fournier.  She requires evaluation and anesthesia risk assessment prior to undergoing surgery/procedure for treatment of :  total laparoscopic hysterectomy, bilateral salpingectomy .      Primary Physician: Anita Quiros  Type of Anesthesia Anticipated: General    Patient has a Health Care Directive or Living Will:  NO    Preop Questions 10/23/2018   Who is doing your surgery? dr soler   What are you having done? hysterectomy   Date of Surgery/Procedure: 2018   Facility or Hospital where procedure/surgery will be performed: [facility]   1.  Do you have a history of Heart attack, stroke, stent, coronary bypass surgery, or other heart surgery? No   2.  Do you ever have any pain or discomfort in your chest? No   3.  Do you have a history of  Heart Failure? No   4.   Are you troubled by shortness of breath when:  walking on a level surface, or up a slight hill, or at night? No   5.  Do you currently have a cold, bronchitis or other respiratory infection? No   6.  Do you have a cough, shortness of breath, or wheezing? No   7.  Do you sometimes get pains in the calves of your legs when you walk? No   8. Do you or anyone in your family have previous history of blood clots? No   9.  Do you or does anyone in your family have a serious bleeding problem such as prolonged bleeding following surgeries or cuts? YES - factor 12 issue   10. Have you ever had problems with anemia or been told to take iron pills? YES - has vaginal bleeding, cause for surgery   11. Have you had any abnormal blood loss such as black, tarry or bloody stools, or abnormal vaginal bleeding? No   12. Have you ever had a blood transfusion? No   13. Have you or any  of your relatives ever had problems with anesthesia? No   14. Do you have sleep apnea, excessive snoring or daytime drowsiness? No   15. Do you have any prosthetic heart valves? No   16. Do you have prosthetic joints? No   17. Is there any chance that you may be pregnant? No         HPI:     HPI related to upcoming procedure: patient has suffered from ongoing heavy menstrual bleeding that caused anemia      ANEMIA - Patient has a recent history of moderate-severe anemia, which has been symptomatic. Work up to date has revealed 11.0. Treatment has been monitoring with plan for hysterectomy.                                                                                                                                          .  DEPRESSION - Patient has a long history of Depression of moderate severity requiring medication for control with recent symptoms being stable..Current symptoms of depression include none.                                                                                                                                                                                    .  HYPOTHYROIDISM - Patient has a longstanding history of chronic Hypothyroidism. Patient has been doing well, noting no tremor, insomnia, hair loss or changes in skin texture. Continues to take medications as directed, without adverse reactions or side effects. Last TSH   Lab Results   Component Value Date    TSH 3.06 04/17/2018   .                                                                                                                                                                                                                        .    MEDICAL HISTORY:     Patient Active Problem List    Diagnosis Date Noted     Bleeding disorder (H) 10/23/2018     Priority: Medium     factor 12       Low hemoglobin 04/17/2018     Priority: Medium     Insomnia, unspecified type 04/17/2018     Priority: Medium     Pain in both feet  09/27/2017     Priority: Medium     Abnormal uterine bleeding 09/14/2017     Priority: Medium     Vitiligo 07/17/2017     Priority: Medium     Supraumbilical hernia 07/17/2017     Priority: Medium     Uterine leiomyoma, unspecified location 07/17/2017     Priority: Medium     Adjustment disorder with mixed anxiety and depressed mood 07/17/2017     Priority: Medium     Overweight 07/28/2015     Priority: Medium     Migraine headache 02/19/2013     Priority: Medium     CARDIOVASCULAR SCREENING; LDL GOAL LESS THAN 160 10/31/2010     Priority: Medium     Hypothyroidism 11/18/2009     Priority: Medium      Past Medical History:   Diagnosis Date     Allergic rhinitis, cause unspecified      Bleeding disorder (H)     factor 12     CARDIOVASCULAR SCREENING; LDL GOAL LESS THAN 160 10/31/2010     Hashimoto's thyroiditis      Lateral epicondylitis 3/13/2009     Migraine headache 2/19/2013     NONSPECIFIC MEDICAL HISTORY 2002    Factor XII deficiency (asymptomatic)     NONSPECIFIC MEDICAL HISTORY 2002    GRAYSON positive 1:320, seen by Rheum (negative PHUONG eval)     Past Surgical History:   Procedure Laterality Date     ABDOMEN SURGERY      10/2007     APPENDECTOMY      10/2007     C NONSPECIFIC PROCEDURE      wisdom teeth extraction     C NONSPECIFIC PROCEDURE      Bilateral tubal ligation     C NONSPECIFIC PROCEDURE  1/01    Lasik     C NONSPECIFIC PROCEDURE      explo lap for ARMANDO for SBO     DAVINCI HERNIORRHAPHY VENTRAL N/A 8/15/2017    Procedure: DAVINCI HERNIORRHAPHY VENTRAL;  Robotic assisted incisional and supra-umbilical  hernia repair with mesh;  Surgeon: Damon Hall MD;  Location: [facility] OR     DILATION AND CURETTAGE, HYSTEROSCOPY DIAGNOSTIC, COMBINED N/A 11/3/2017    Procedure: COMBINED DILATION AND CURETTAGE, HYSTEROSCOPY DIAGNOSTIC;  Exam Under Anesthesia, Diagnosistic Hysteroscopy, dilation and curettage, diagnostic laparoscopy and lysis of adhesions;  Surgeon: Neptali Fournier MD;  Location: [facility] OR      LAPAROSCOPY DIAGNOSTIC (GYN) N/A 11/3/2017    Procedure: LAPAROSCOPY DIAGNOSTIC (GYN);  diagnostic laparoscopy, lysis of adhesions;  Surgeon: Neptali Fournier MD;  Location:  OR     Current Outpatient Prescriptions   Medication Sig Dispense Refill     acetaminophen (TYLENOL) 325 MG tablet Take 2 tablets (650 mg) by mouth every 4 hours as needed for other (mild pain) 100 tablet 0     cetirizine (ZYRTEC) 10 MG tablet Take 10 mg by mouth daily       cholecalciferol (D 2000) 2000 UNITS tablet Take 2,000 Units by mouth       ibuprofen (ADVIL/MOTRIN) 600 MG tablet Take 1 tablet (600 mg) by mouth every 6 hours as needed for pain (mild) 30 tablet 0     levothyroxine (SYNTHROID/LEVOTHROID) 25 MCG tablet TAKE 1 TABLET BY MOUTH  DAILY 90 tablet 2     melatonin 3 MG tablet Take 1.5 tablets (4.5 mg) by mouth nightly as needed for sleep 30 tablet 0     Multiple Vitamin (MULTI-VITAMINS) TABS Take 1 tablet by mouth       sertraline (ZOLOFT) 50 MG tablet Take 1 tablet (50 mg) by mouth daily 30 tablet 3     OTC products: no recent use of OTC ASA, NSAIDS or Steroids    Allergies   Allergen Reactions     No Known Drug Allergies       Latex Allergy: NO    Social History   Substance Use Topics     Smoking status: Never Smoker     Smokeless tobacco: Never Used     Alcohol use 0.0 oz/week     0 Standard drinks or equivalent per week      Comment: occasionally     History   Drug Use No       REVIEW OF SYSTEMS:   CONSTITUTIONAL: NEGATIVE for fever, chills, change in weight  INTEGUMENTARY/SKIN: NEGATIVE for worrisome rashes, moles or lesions  EYES: NEGATIVE for vision changes or irritation  ENT/MOUTH: NEGATIVE for ear, mouth and throat problems  RESP: NEGATIVE for significant cough or SOB  BREAST: NEGATIVE for masses, tenderness or discharge  CV: NEGATIVE for chest pain, palpitations or peripheral edema  GI: NEGATIVE for nausea, abdominal pain, heartburn, or change in bowel habits  : NEGATIVE for frequency, dysuria, or  "hematuria  MUSCULOSKELETAL: NEGATIVE for significant arthralgias or myalgia  NEURO: NEGATIVE for weakness, dizziness or paresthesias  ENDOCRINE: NEGATIVE for temperature intolerance, skin/hair changes  HEME: NEGATIVE for bleeding problems  PSYCHIATRIC: NEGATIVE for changes in mood or affect    EXAM:   /80 (BP Location: Right arm, Patient Position: Chair, Cuff Size: Adult Regular)  Pulse 76  Temp 98.2  F (36.8  C) (Oral)  Resp 16  Ht 5' 2\" (1.575 m)  Wt 179 lb 8 oz (81.4 kg)  LMP 09/29/2018 (Exact Date)  SpO2 98%  Breastfeeding? No  BMI 32.83 kg/m2    GENERAL APPEARANCE: healthy, alert and no distress     EYES: EOMI, PERRL     HENT: ear canals and TM's normal and nose and mouth without ulcers or lesions     NECK: no adenopathy, no asymmetry, masses, or scars and thyroid normal to palpation     RESP: lungs clear to auscultation - no rales, rhonchi or wheezes     CV: regular rates and rhythm, normal S1 S2, no S3 or S4 and no murmur, click or rub     ABDOMEN:  soft, nontender, no HSM or masses and bowel sounds normal     MS: extremities normal- no gross deformities noted, no evidence of inflammation in joints, FROM in all extremities.     SKIN: no suspicious lesions or rashes     NEURO: Normal strength and tone, sensory exam grossly normal, mentation intact and speech normal     PSYCH: mentation appears normal. and affect normal/bright     LYMPHATICS: No cervical adenopathy    DIAGNOSTICS:   Hemoglobin (indicated for history of anemia or procedure with significant blood loss such as tonsillectomy, major intraperitoneal surgery, vascular surgery, major spine surgery, total joint replacement)    Results for orders placed or performed in visit on 10/23/18   Hemoglobin   Result Value Ref Range    Hemoglobin 11.0 (L) 11.7 - 15.7 g/dL       Recent Labs   Lab Test  10/04/18   1446  06/06/18   1538   04/17/18   0755   07/17/17   0907  04/28/17   HGB  11.3*  11.8   < >  8.5*   < >  11.7   < >   --    PLT  367  313 "   < >  452*   < >  340   < >   --    NA   --    --    --   141   --   140   < >   --    POTASSIUM   --    --    --   4.3   --   4.4   < >   --    CR   --    --    --   0.71   --   0.83   < >   --    A1C   --    --    --   5.4   --    --    --   5.0    < > = values in this interval not displayed.        IMPRESSION:   Reason for surgery/procedure: total laparoscopic hysterectomy, bilateral salpingectomy  Diagnosis/reason for consult: AUB    The proposed surgical procedure is considered INTERMEDIATE risk.    REVISED CARDIAC RISK INDEX  The patient has the following serious cardiovascular risks for perioperative complications such as (MI, PE, VFib and 3  AV Block):  No serious cardiac risks  INTERPRETATION: 0 risks: Class I (very low risk - 0.4% complication rate)    The patient has the following additional risks for perioperative complications:  No identified additional risks      ICD-10-CM    1. Preop general physical exam Z01.818 Hemoglobin     EKG 12-lead complete w/read - Clinics   2. Abnormal uterine bleeding N93.9    3. Adjustment disorder with mixed anxiety and depressed mood F43.23 sertraline (ZOLOFT) 50 MG tablet   4. Bleeding disorder (H) D69.9        RECOMMENDATIONS:     --Consult hospital rounder / IM to assist post-op medical management  --Patient knows where to be and when to be for surgery, knows when to be NPO.  --Patient advised no ASA, NSAIDs or Steroids 7 days prior, Tylenol okay for pain  --Recommending holding Zoloft am of surgery, resume post operative.      APPROVAL GIVEN to proceed with proposed procedure, without further diagnostic evaluation       Signed Electronically by: Anita Quiros PA-C    Copy of this evaluation report is provided to requesting physician.    [facility] Preop Guidelines    Revised Cardiac Risk Index

## 2018-10-23 NOTE — MR AVS SNAPSHOT
After Visit Summary   10/23/2018    Sha Vidal    MRN: [id number]           Patient Information     Date Of Birth          1970        Visit Information        Provider Department      10/23/2018 3:10 PM Anita Quiros PA-C [facility]        Today's Diagnoses     Preop general physical exam    -  1    Abnormal uterine bleeding        Bleeding disorder (H)        Adjustment disorder with mixed anxiety and depressed mood          Care Instructions      Before Your Surgery      Call your surgeon if there is any change in your health. This includes signs of a cold or flu (such as a sore throat, runny nose, cough, rash or fever).    Do not smoke, drink alcohol or take over the counter medicine (unless your surgeon or primary care doctor tells you to) for the 24 hours before and after surgery.    If you take prescribed drugs: Follow your doctor s orders about which medicines to take and which to stop until after surgery.    Eating and drinking prior to surgery: follow the instructions from your surgeon    Take a shower or bath the night before surgery. Use the soap your surgeon gave you to gently clean your skin. If you do not have soap from your surgeon, use your regular soap. Do not shave or scrub the surgery site.  Wear clean pajamas and have clean sheets on your bed.           Follow-ups after your visit        Follow-up notes from your care team     Return in about 1 week (around 10/30/2018) for If symptoms worsen or fail to improve.      Your next 10 appointments already scheduled     Nov 12, 2018 11:00 AM CST   SHORT with Neptali Fournier MD   [facility]    [address]  [address]  [facility] [address]   [phone]            Nov 20, 2018   Procedure with Neptali Fournier MD   [facility] (--)    [address]  [facility] [address]   [phone]            Nov 27, 2018  1:00  "PM CST   SHORT with Neptali Fournier MD   [facility] ([facility])    [address]  [address]  [facility] [address] [phone]              Who to contact     If you have questions or need follow up information about today's clinic visit or your schedule please contact [facility] directly at [phone].  Normal or non-critical lab and imaging results will be communicated to you by [employer], letter or phone within 4 business days after the clinic has received the results. If you do not hear from us within 7 days, please contact the clinic through [employer] or phone. If you have a critical or abnormal lab result, we will notify you by phone as soon as possible.  Submit refill requests through "[employer]" or call your pharmacy and they will forward the refill request to us. Please allow 3 business days for your refill to be completed.          Additional Information About Your Visit        [facility] Information     "[employer]" gives you secure access to your electronic health record. If you see a primary care provider, you can also send messages to your care team and make appointments. If you have questions, please call your primary care clinic.  If you do not have a primary care provider, please call [phone] and they will assist you.        Care EveryWhere ID     This is your Care EveryWhere ID. This could be used by other organizations to access your [facility] medical records  [id number]        Your Vitals Were     Pulse Temperature Respirations Height Last Period Pulse Oximetry    76 98.2  F (36.8  C) (Oral) 16 5' 2\" (1.575 m) 09/29/2018 (Exact Date) 98%    Breastfeeding? BMI (Body Mass Index)                No 32.83 kg/m2           Blood Pressure from Last 3 Encounters:   10/23/18 114/80   10/04/18 102/76   04/17/18 112/74    Weight from Last 3 Encounters:   10/23/18 179 lb 8 oz (81.4 kg)   10/04/18 179 lb (81.2 kg)   04/17/18 170 lb 9.6 oz (77.4 kg)    "           We Performed the Following     EKG 12-lead complete w/read - Clinics     Hemoglobin          Where to get your medicines      These medications were sent to [facility] - [address], [facility] [address]     Phone:  [phone]     sertraline 50 MG tablet          Primary Care Provider Office Phone # Fax #    Anita Mari Quiros PA-C [phone]       [address]  [facility] [address]        Equal Access to Services     [facility]: Hadii aad ku hadasho Soomaali, waaxda luqadaha, qaybta kaalmada adeegyada, waxay idiin hayaan adeeg kharakan bland . So [facility] [phone].    ATENCIÓN: Si habla español, tiene a collins disposición servicios gratuitos de asistencia lingüística. [facility] [phone].    We comply with applicable federal civil rights laws and [place] laws. We do not discriminate on the basis of race, color, national origin, age, disability, sex, sexual orientation, or gender identity.            Thank you!     Thank you for choosing [facility]  for your care. Our goal is always to provide you with excellent care. Hearing back from our patients is one way we can continue to improve our services. Please take a few minutes to complete the written survey that you may receive in the mail after your visit with us. Thank you!             Your Updated Medication List - Protect others around you: Learn how to safely use, store and throw away your medicines at www.disposemymeds.org.          This list is accurate as of 10/23/18  3:28 PM.  Always use your most recent med list.                   Brand Name Dispense Instructions for use Diagnosis    acetaminophen 325 MG tablet    TYLENOL    100 tablet    Take 2 tablets (650 mg) by mouth every 4 hours as needed for other (mild pain)    Post-operative state       cetirizine 10 MG tablet    zyrTEC     Take 10 mg by mouth daily        D 2000 2000 units  tablet   Generic drug:  cholecalciferol      Take 2,000 Units by mouth        ibuprofen 600 MG tablet    ADVIL/MOTRIN    30 tablet    Take 1 tablet (600 mg) by mouth every 6 hours as needed for pain (mild)    Post-operative state       levothyroxine 25 MCG tablet    SYNTHROID/LEVOTHROID    90 tablet    TAKE 1 TABLET BY MOUTH  DAILY    Hypothyroidism, unspecified type       melatonin 3 MG tablet     30 tablet    Take 1.5 tablets (4.5 mg) by mouth nightly as needed for sleep    Insomnia, unspecified type       MULTI-VITAMINS Tabs      Take 1 tablet by mouth        sertraline 50 MG tablet    ZOLOFT    90 tablet    Take 1 tablet (50 mg) by mouth daily    Adjustment disorder with mixed anxiety and depressed mood

## 2018-11-12 ENCOUNTER — OFFICE VISIT (OUTPATIENT)
Dept: OBGYN | Facility: CLINIC | Age: 48
End: 2018-11-12
Payer: COMMERCIAL

## 2018-11-12 VITALS — SYSTOLIC BLOOD PRESSURE: 112 MMHG | WEIGHT: 179 LBS | DIASTOLIC BLOOD PRESSURE: 72 MMHG | BODY MASS INDEX: 32.74 KG/M2

## 2018-11-12 DIAGNOSIS — N93.9 ABNORMAL UTERINE BLEEDING: Primary | ICD-10-CM

## 2018-11-12 DIAGNOSIS — D25.9 UTERINE LEIOMYOMA, UNSPECIFIED LOCATION: ICD-10-CM

## 2018-11-12 PROCEDURE — 99213 OFFICE O/P EST LOW 20 MIN: CPT | Performed by: OBSTETRICS & GYNECOLOGY

## 2018-11-12 NOTE — NURSING NOTE
"Chief Complaint   Patient presents with     Consult     surg        Initial /72  Wt 179 lb (81.2 kg)  LMP 10/31/2018 (Exact Date)  BMI 32.74 kg/m2 Estimated body mass index is 32.74 kg/(m^2) as calculated from the following:    Height as of 10/23/18: 5' 2\" (1.575 m).    Weight as of this encounter: 179 lb (81.2 kg).  BP completed using cuff size: regular    Questioned patient about current smoking habits.  Pt. has never smoked.          The following HM Due:       The following patient reported/Care Every where data was sent to:  P ABSTRACT QUALITY INITIATIVES [[id number]]        Isa Peña [facility]                 "

## 2018-11-12 NOTE — PATIENT INSTRUCTIONS
You can reach your [facility] Care Team any time of the day by calling [phone]. This number will put you in touch with the 24 hour nurse line if the clinic is closed.    To contact your OB/GYN Station Coordinator/Surgery Scheduler please call [phone]. This is a direct number for your care team between 8 a.m. and 4 p.m. Monday through Friday.    [facility] is open for your convenience:  Monday through Friday 8 a.m. to 6 p.m.  Closed weekends and all major holidays.

## 2018-11-12 NOTE — MR AVS SNAPSHOT
After Visit Summary   11/12/2018    Sha Vidal    MRN: [id number]           Patient Information     Date Of Birth          1970        Visit Information        Provider Department      11/12/2018 11:00 AM Neptali Fournier MD [facility]        Today's Diagnoses     Abnormal uterine bleeding    -  1    Uterine leiomyoma, unspecified location          Care Instructions    You can reach your [facility] Care Team any time of the day by calling [phone]. This number will put you in touch with the 24 hour nurse line if the clinic is closed.    To contact your OB/GYN Station Coordinator/Surgery Scheduler please call [phone]. This is a direct number for your care team between 8 a.m. and 4 p.m. Monday through Friday.    [facility] is open for your convenience:  Monday through Friday 8 a.m. to 6 p.m.  Closed weekends and all major holidays.            Follow-ups after your visit        Your next 10 appointments already scheduled     Nov 20, 2018   Procedure with Neptali Fournier MD   [facility] (--)    [address]  [facility] [id number] [phone] Nov 27, 2018  1:00 PM CST   SHORT with Neptali Fournier MD   [facility]    [address]  [address]  [facility] [id number] [phone]              Who to contact     If you have questions or need follow up information about today's clinic visit or your schedule please contact [facility] directly at [phone].  Normal or non-critical lab and imaging results will be communicated to you by MyChart, letter or phone within 4 business days after the clinic has received the results. If you do not hear from us within 7 days, please contact the clinic through MyChart or phone. If you have a critical or abnormal lab result, we will notify you by phone as soon as possible.  Submit refill requests through [employer] or  call your pharmacy and they will forward the refill request to us. Please allow 3 business days for your refill to be completed.          Additional Information About Your Visit        MyChart Information     [employer] gives you secure access to your electronic health record. If you see a primary care provider, you can also send messages to your care team and make appointments. If you have questions, please call your primary care clinic.  If you do not have a primary care provider, please call [phone] and they will assist you.        Care EveryWhere ID     This is your Care EveryWhere ID. This could be used by other organizations to access your [facility] medical records  [id number]        Your Vitals Were     Last Period BMI (Body Mass Index)                10/31/2018 (Exact Date) 32.74 kg/m2           Blood Pressure from Last 3 Encounters:   11/12/18 112/72   10/23/18 114/80   10/04/18 102/76    Weight from Last 3 Encounters:   11/12/18 179 lb (81.2 kg)   10/23/18 179 lb 8 oz (81.4 kg)   10/04/18 179 lb (81.2 kg)              Today, you had the following     No orders found for display       Primary Care Provider Office Phone # Fax #    Anita Quiros PA-C [phone]       [id number]  [facility] [address]        Equal Access to Services     ROBSON CONLEY : Hadii aad ku hadasho Soomaali, waaxda luqadaha, qaybta kaalmada adeegyada, waxay keeshain hayaan alejandrina bland . So [facility] [phone].    ATENCIÓN: Si habla español, tiene a collins disposición servicios gratuitos de asistencia lingüística. Llame al [phone].    We comply with applicable federal civil rights laws and [place] laws. We do not discriminate on the basis of race, color, national origin, age, disability, sex, sexual orientation, or gender identity.            Thank you!     Thank you for choosing [facility]  for your care. Our goal is always to provide you with excellent care. Hearing back from  our patients is one way we can continue to improve our services. Please take a few minutes to complete the written survey that you may receive in the mail after your visit with us. Thank you!             Your Updated Medication List - Protect others around you: Learn how to safely use, store and throw away your medicines at www.disposemymeds.org.          This list is accurate as of 11/12/18 11:59 PM.  Always use your most recent med list.                   Brand Name Dispense Instructions for use Diagnosis    acetaminophen 325 MG tablet    TYLENOL    100 tablet    Take 2 tablets (650 mg) by mouth every 4 hours as needed for other (mild pain)    Post-operative state       cetirizine 10 MG tablet    zyrTEC     Take 10 mg by mouth daily        D 2000 2000 units tablet   Generic drug:  cholecalciferol      Take 2,000 Units by mouth        ibuprofen 600 MG tablet    ADVIL/MOTRIN    30 tablet    Take 1 tablet (600 mg) by mouth every 6 hours as needed for pain (mild)    Post-operative state       levothyroxine 25 MCG tablet    SYNTHROID/LEVOTHROID    90 tablet    TAKE 1 TABLET BY MOUTH  DAILY    Hypothyroidism, unspecified type       melatonin 3 MG tablet     30 tablet    Take 1.5 tablets (4.5 mg) by mouth nightly as needed for sleep    Insomnia, unspecified type       MULTI-VITAMINS Tabs      Take 1 tablet by mouth        sertraline 50 MG tablet    ZOLOFT    90 tablet    Take 1 tablet (50 mg) by mouth daily    Adjustment disorder with mixed anxiety and depressed mood

## 2018-11-13 NOTE — PROGRESS NOTES
Sha Vidal is a 48 year old female  Patient's last menstrual period was 2018 (exact date). TL for contraception, who I am asked to see by Anita ONEAL  presents for evaluation of menorrhagia with associated anemia  that is getting progressively worse.  The patient states that she has very regular 28-30-day cycles that they are now lasting 7 days and for the first 3 days of flow she will go through at minimum a super pad and tampon hourly.  After her menses she feels tired and fatigued.  In 2018 she was noted to be anemic with a hemoglobin of 8.5 With a hypochromic microcytic iron deficiency anemia.  Iron studies were performed confirming the diagnosis.  The patient has been treated and today feels better.  Most recent hemoglobin 2018 showed a hemoglobin of 11.8.   In 2017 the patient underwent an ultrasound exam for evaluation of menorrhagia with the following findings  Measurements:  Uterus: 9.2 x 4.9 x 6.2cm.     Position is anteverted.  Contour is irreg w myomata:   1) Mid 4.3 x 3.6 x 4.0cm.      Endo cav: 4 mm         Distorted  Cervix: Wnl, Nabothian Cyst noted       Right ovary: 2.6 x 1.2 x 1.6cm.   Wnl  Left ovary:   2.2 x 1.1 x 1.5 cm.  Complex cyst 1.3 x 1.1 x 1.3cm      Cul de sac: no free fluid       on November 3, 2017 the patient underwent a hysteroscopy attempted D&C diagnostic laparoscopy for menorrhagia.   Details from the hysteroscopy:  The endocervical canal is within normal limits.  The endometrial cavity sounded to 12 cm and with the hysteroscope contained multiple synechiae and a suggestion of an approximate 1.5 cm polypoid structure in the left lower uterine segment.  I was unable to clearly identify the fundus of the uterus, I was unable to clearly identify tubal ostia.  There was no obvious source of perforation hysteroscopically.  Because of a sudden fluid discrepancy concern was that potentially a perforation could be present.   At the time of  laparoscopy, the abdominal cavity was examined.  There was no source of perforation noted.  The patient was status post tubal sterilization procedure.  The right and left ovary were seen and appeared to be within normal limits.  I did not find any evidence of complex cyst on the left ovary.  There was extensive adhesive disease and tunneling in the cul-de-sac with tunneling present by the base of the right uterosacral ligament as well as the midportion of the left uterosacral ligament.  I did not see any excrescences active endometriosis or obvious signs of malignancy.  The left round ligament is within normal limits.  Anterior surface of the uterus and bladder flap are within normal limits.  Right round ligament is within normal limits.  The proximal fallopian tube on the right side was noted distal fimbriated end was noted with a sterilization procedure performed.  Right ovary is as noted.  Right lateral pelvic sidewalls, otherwise unremarkable.  Posterior aspect of the uterus showed no evidence of perforation.  The cul-de-sac,  was visualized and other than tunneling no active endometriosis or abnormalities were noted.  Rectum also appeared to be normal.  The left lateral sidewall other than tunneling in the region of the left uterosacral ligament was unremarkable.  There were thin omental adhesions to the anterior abdominal wall that Dr. Hall, a general surgeon who was consulted removed.  Since the time of the procedure her bleeding has become worse.  She has no history of any abnormal Paps no history of any Endocrinopathy.  She had normal thyroid function testing done in April 2018.  She denies any postcoital bleeding.  The patient does experience catamenial headaches I have also reviewed the operative note from November 3, 2017.  As a part of her workup on October 5, 2018 the patient had a follow-up pelvic ultrasound which showed the following    Indications for ultrasound:   Bleeding/Menses - AUB,  fibroid      LMP: 29 Sep 18    Hormones: none      Measurements:  Uterus:   12.2 x 8.2 x 8.3 cm.     Position is anteverted.  Contour is irreg w myomata:   1) right mid 7.6 x 7.3 x 6.0 cm.      Endo cav: nv       Anechoic area noted near fibroid 1.4 x 0.8 cm   Cervix: wnl      Right ovary: nv.     Left ovary:    2.5 x 1.6 x 1.5 cm.  Wnl      Cul de sac: no free fluid    In reviewing these findings with the patient discussing with her the risks benefits and alternative forms of therapy the patient has strong feelings about her seating with extirprative surgery.  We discussed doing a total laparoscopic hysterectomy with bilateral salpingectomy and if ovaries appear normal ovarian preservation.  Pre-and postop instructions risks benefits alternatives have been reviewed with her at length on multiple occasions.  All of her questions have been answered.  We discussed the importance of a preop bowel prep.  I think the patient is a good understanding of the nature the problem the nature of the procedure.  Informed consent has been obtained.  Pre-and postop cares were reviewed with her at length.  And a detailed written outline given of our discussion today given to the patient.  The patient is scheduled for this procedure on Tuesday, November 20, 2018      Past Medical History:   Diagnosis Date     Allergic rhinitis, cause unspecified      Bleeding disorder (H)     factor 12     CARDIOVASCULAR SCREENING; LDL GOAL LESS THAN 160 10/31/2010     Hashimoto's thyroiditis      Lateral epicondylitis 3/13/2009     Migraine headache 2/19/2013     NONSPECIFIC MEDICAL HISTORY 2002    Factor XII deficiency (asymptomatic)     NONSPECIFIC MEDICAL HISTORY 2002    GRAYSON positive 1:320, seen by Rheum (negative PHUONG eval)     Past Surgical History:   Procedure Laterality Date     ABDOMEN SURGERY      10/2007     APPENDECTOMY      10/2007     C NONSPECIFIC PROCEDURE      wisdom teeth extraction     C NONSPECIFIC PROCEDURE      Bilateral tubal  ligation     C NONSPECIFIC PROCEDURE  1/01    Lasik     C NONSPECIFIC PROCEDURE      explo lap for ARMANDO for SBO     DAVINCI HERNIORRHAPHY VENTRAL N/A 8/15/2017    Procedure: DAVINCI HERNIORRHAPHY VENTRAL;  Robotic assisted incisional and supra-umbilical  hernia repair with mesh;  Surgeon: Damon Hall MD;  Location: [facility] OR     DILATION AND CURETTAGE, HYSTEROSCOPY DIAGNOSTIC, COMBINED N/A 11/3/2017    Procedure: COMBINED DILATION AND CURETTAGE, HYSTEROSCOPY DIAGNOSTIC;  Exam Under Anesthesia, Diagnosistic Hysteroscopy, dilation and curettage, diagnostic laparoscopy and lysis of adhesions;  Surgeon: Neptali Fournier MD;  Location: [facility] OR     LAPAROSCOPY DIAGNOSTIC (GYN) N/A 11/3/2017    Procedure: LAPAROSCOPY DIAGNOSTIC (GYN);  diagnostic laparoscopy, lysis of adhesions;  Surgeon: Neptali Fournier MD;  Location: [facility] OR      ROS: 10 point ROS neg other than the symptoms noted above in the HPI.  /72  Wt 179 lb (81.2 kg)  LMP 10/31/2018 (Exact Date)  BMI 32.74 kg/m2  Constitutional: healthy, alert and no distress      (N93.9) Abnormal uterine bleeding  (primary encounter diagnosis)  Comment: Risks, benefits, and alternative modes of therapy discussed at length. Pathophysiology of the disease process reviewed, all of the patients questions answered and informed consent obtained.    Plan: We will proceed with TLH bilateral salpingectomy.  Issues with potential adhesive disease reviewed.  All questions answered    (D25.9) Uterine leiomyoma, unspecified location  Comment: As above  Plan: As above  15 min spent w > 50% in counseling

## 2018-11-19 NOTE — H&P (VIEW-ONLY)
[facility]  [id number] [facility] [id number] [phone]  Dept: [phone]    PRE-OP EVALUATION:  Today's date: 10/23/2018    Sha Vidal (: 1970) presents for pre-operative evaluation assessment as requested by Dr. Neptali Fournier.  She requires evaluation and anesthesia risk assessment prior to undergoing surgery/procedure for treatment of :  total laparoscopic hysterectomy, bilateral salpingectomy .      Primary Physician: Anita Quiros  Type of Anesthesia Anticipated: General    Patient has a Health Care Directive or Living Will:  NO    Preop Questions 10/23/2018   Who is doing your surgery? dr soler   What are you having done? hysterectomy   Date of Surgery/Procedure: 2018   Facility or Hospital where procedure/surgery will be performed: [facility]   1.  Do you have a history of Heart attack, stroke, stent, coronary bypass surgery, or other heart surgery? No   2.  Do you ever have any pain or discomfort in your chest? No   3.  Do you have a history of  Heart Failure? No   4.   Are you troubled by shortness of breath when:  walking on a level surface, or up a slight hill, or at night? No   5.  Do you currently have a cold, bronchitis or other respiratory infection? No   6.  Do you have a cough, shortness of breath, or wheezing? No   7.  Do you sometimes get pains in the calves of your legs when you walk? No   8. Do you or anyone in your family have previous history of blood clots? No   9.  Do you or does anyone in your family have a serious bleeding problem such as prolonged bleeding following surgeries or cuts? YES - factor 12 issue   10. Have you ever had problems with anemia or been told to take iron pills? YES - has vaginal bleeding, cause for surgery   11. Have you had any abnormal blood loss such as black, tarry or bloody stools, or abnormal vaginal bleeding? No   12. Have you ever had a blood transfusion? No   13. Have you or any  of your relatives ever had problems with anesthesia? No   14. Do you have sleep apnea, excessive snoring or daytime drowsiness? No   15. Do you have any prosthetic heart valves? No   16. Do you have prosthetic joints? No   17. Is there any chance that you may be pregnant? No         HPI:     HPI related to upcoming procedure: patient has suffered from ongoing heavy menstrual bleeding that caused anemia      ANEMIA - Patient has a recent history of moderate-severe anemia, which has been symptomatic. Work up to date has revealed 11.0. Treatment has been monitoring with plan for hysterectomy.                                                                                                                                          .  DEPRESSION - Patient has a long history of Depression of moderate severity requiring medication for control with recent symptoms being stable..Current symptoms of depression include none.                                                                                                                                                                                    .  HYPOTHYROIDISM - Patient has a longstanding history of chronic Hypothyroidism. Patient has been doing well, noting no tremor, insomnia, hair loss or changes in skin texture. Continues to take medications as directed, without adverse reactions or side effects. Last TSH   Lab Results   Component Value Date    TSH 3.06 04/17/2018   .                                                                                                                                                                                                                        .    MEDICAL HISTORY:     Patient Active Problem List    Diagnosis Date Noted     Bleeding disorder (H) 10/23/2018     Priority: Medium     factor 12       Low hemoglobin 04/17/2018     Priority: Medium     Insomnia, unspecified type 04/17/2018     Priority: Medium     Pain in both feet  09/27/2017     Priority: Medium     Abnormal uterine bleeding 09/14/2017     Priority: Medium     Vitiligo 07/17/2017     Priority: Medium     Supraumbilical hernia 07/17/2017     Priority: Medium     Uterine leiomyoma, unspecified location 07/17/2017     Priority: Medium     Adjustment disorder with mixed anxiety and depressed mood 07/17/2017     Priority: Medium     Overweight 07/28/2015     Priority: Medium     Migraine headache 02/19/2013     Priority: Medium     CARDIOVASCULAR SCREENING; LDL GOAL LESS THAN 160 10/31/2010     Priority: Medium     Hypothyroidism 11/18/2009     Priority: Medium      Past Medical History:   Diagnosis Date     Allergic rhinitis, cause unspecified      Bleeding disorder (H)     factor 12     CARDIOVASCULAR SCREENING; LDL GOAL LESS THAN 160 10/31/2010     Hashimoto's thyroiditis      Lateral epicondylitis 3/13/2009     Migraine headache 2/19/2013     NONSPECIFIC MEDICAL HISTORY 2002    Factor XII deficiency (asymptomatic)     NONSPECIFIC MEDICAL HISTORY 2002    GRAYSON positive 1:320, seen by Rheum (negative PHUONG eval)     Past Surgical History:   Procedure Laterality Date     ABDOMEN SURGERY      10/2007     APPENDECTOMY      10/2007     C NONSPECIFIC PROCEDURE      wisdom teeth extraction     C NONSPECIFIC PROCEDURE      Bilateral tubal ligation     C NONSPECIFIC PROCEDURE  1/01    Lasik     C NONSPECIFIC PROCEDURE      explo lap for ARMANDO for SBO     DAVINCI HERNIORRHAPHY VENTRAL N/A 8/15/2017    Procedure: DAVINCI HERNIORRHAPHY VENTRAL;  Robotic assisted incisional and supra-umbilical  hernia repair with mesh;  Surgeon: Damon Hall MD;  Location: [facility] OR     DILATION AND CURETTAGE, HYSTEROSCOPY DIAGNOSTIC, COMBINED N/A 11/3/2017    Procedure: COMBINED DILATION AND CURETTAGE, HYSTEROSCOPY DIAGNOSTIC;  Exam Under Anesthesia, Diagnosistic Hysteroscopy, dilation and curettage, diagnostic laparoscopy and lysis of adhesions;  Surgeon: Neptali Fournier MD;  Location: [facility] OR      LAPAROSCOPY DIAGNOSTIC (GYN) N/A 11/3/2017    Procedure: LAPAROSCOPY DIAGNOSTIC (GYN);  diagnostic laparoscopy, lysis of adhesions;  Surgeon: Neptali Fournier MD;  Location:  OR     Current Outpatient Prescriptions   Medication Sig Dispense Refill     acetaminophen (TYLENOL) 325 MG tablet Take 2 tablets (650 mg) by mouth every 4 hours as needed for other (mild pain) 100 tablet 0     cetirizine (ZYRTEC) 10 MG tablet Take 10 mg by mouth daily       cholecalciferol (D 2000) 2000 UNITS tablet Take 2,000 Units by mouth       ibuprofen (ADVIL/MOTRIN) 600 MG tablet Take 1 tablet (600 mg) by mouth every 6 hours as needed for pain (mild) 30 tablet 0     levothyroxine (SYNTHROID/LEVOTHROID) 25 MCG tablet TAKE 1 TABLET BY MOUTH  DAILY 90 tablet 2     melatonin 3 MG tablet Take 1.5 tablets (4.5 mg) by mouth nightly as needed for sleep 30 tablet 0     Multiple Vitamin (MULTI-VITAMINS) TABS Take 1 tablet by mouth       sertraline (ZOLOFT) 50 MG tablet Take 1 tablet (50 mg) by mouth daily 30 tablet 3     OTC products: no recent use of OTC ASA, NSAIDS or Steroids    Allergies   Allergen Reactions     No Known Drug Allergies       Latex Allergy: NO    Social History   Substance Use Topics     Smoking status: Never Smoker     Smokeless tobacco: Never Used     Alcohol use 0.0 oz/week     0 Standard drinks or equivalent per week      Comment: occasionally     History   Drug Use No       REVIEW OF SYSTEMS:   CONSTITUTIONAL: NEGATIVE for fever, chills, change in weight  INTEGUMENTARY/SKIN: NEGATIVE for worrisome rashes, moles or lesions  EYES: NEGATIVE for vision changes or irritation  ENT/MOUTH: NEGATIVE for ear, mouth and throat problems  RESP: NEGATIVE for significant cough or SOB  BREAST: NEGATIVE for masses, tenderness or discharge  CV: NEGATIVE for chest pain, palpitations or peripheral edema  GI: NEGATIVE for nausea, abdominal pain, heartburn, or change in bowel habits  : NEGATIVE for frequency, dysuria, or  "hematuria  MUSCULOSKELETAL: NEGATIVE for significant arthralgias or myalgia  NEURO: NEGATIVE for weakness, dizziness or paresthesias  ENDOCRINE: NEGATIVE for temperature intolerance, skin/hair changes  HEME: NEGATIVE for bleeding problems  PSYCHIATRIC: NEGATIVE for changes in mood or affect    EXAM:   /80 (BP Location: Right arm, Patient Position: Chair, Cuff Size: Adult Regular)  Pulse 76  Temp 98.2  F (36.8  C) (Oral)  Resp 16  Ht 5' 2\" (1.575 m)  Wt 179 lb 8 oz (81.4 kg)  LMP 09/29/2018 (Exact Date)  SpO2 98%  Breastfeeding? No  BMI 32.83 kg/m2    GENERAL APPEARANCE: healthy, alert and no distress     EYES: EOMI, PERRL     HENT: ear canals and TM's normal and nose and mouth without ulcers or lesions     NECK: no adenopathy, no asymmetry, masses, or scars and thyroid normal to palpation     RESP: lungs clear to auscultation - no rales, rhonchi or wheezes     CV: regular rates and rhythm, normal S1 S2, no S3 or S4 and no murmur, click or rub     ABDOMEN:  soft, nontender, no HSM or masses and bowel sounds normal     MS: extremities normal- no gross deformities noted, no evidence of inflammation in joints, FROM in all extremities.     SKIN: no suspicious lesions or rashes     NEURO: Normal strength and tone, sensory exam grossly normal, mentation intact and speech normal     PSYCH: mentation appears normal. and affect normal/bright     LYMPHATICS: No cervical adenopathy    DIAGNOSTICS:   Hemoglobin (indicated for history of anemia or procedure with significant blood loss such as tonsillectomy, major intraperitoneal surgery, vascular surgery, major spine surgery, total joint replacement)    Results for orders placed or performed in visit on 10/23/18   Hemoglobin   Result Value Ref Range    Hemoglobin 11.0 (L) 11.7 - 15.7 g/dL       Recent Labs   Lab Test  10/04/18   1446  06/06/18   1538   04/17/18   0755   07/17/17   0907  04/28/17   HGB  11.3*  11.8   < >  8.5*   < >  11.7   < >   --    PLT  367  313 "   < >  452*   < >  340   < >   --    NA   --    --    --   141   --   140   < >   --    POTASSIUM   --    --    --   4.3   --   4.4   < >   --    CR   --    --    --   0.71   --   0.83   < >   --    A1C   --    --    --   5.4   --    --    --   5.0    < > = values in this interval not displayed.        IMPRESSION:   Reason for surgery/procedure: total laparoscopic hysterectomy, bilateral salpingectomy  Diagnosis/reason for consult: AUB    The proposed surgical procedure is considered INTERMEDIATE risk.    REVISED CARDIAC RISK INDEX  The patient has the following serious cardiovascular risks for perioperative complications such as (MI, PE, VFib and 3  AV Block):  No serious cardiac risks  INTERPRETATION: 0 risks: Class I (very low risk - 0.4% complication rate)    The patient has the following additional risks for perioperative complications:  No identified additional risks      ICD-10-CM    1. Preop general physical exam Z01.818 Hemoglobin     EKG 12-lead complete w/read - Clinics   2. Abnormal uterine bleeding N93.9    3. Adjustment disorder with mixed anxiety and depressed mood F43.23 sertraline (ZOLOFT) 50 MG tablet   4. Bleeding disorder (H) D69.9        RECOMMENDATIONS:     --Consult hospital rounder / IM to assist post-op medical management  --Patient knows where to be and when to be for surgery, knows when to be NPO.  --Patient advised no ASA, NSAIDs or Steroids 7 days prior, Tylenol okay for pain  --Recommending holding Zoloft am of surgery, resume post operative.      APPROVAL GIVEN to proceed with proposed procedure, without further diagnostic evaluation       Signed Electronically by: Anita Quiros PA-C    Copy of this evaluation report is provided to requesting physician.    [facility] Preop Guidelines    Revised Cardiac Risk Index

## 2018-11-20 ENCOUNTER — ANESTHESIA (OUTPATIENT)
Dept: SURGERY | Facility: CLINIC | Age: 48
End: 2018-11-20
Payer: COMMERCIAL

## 2018-11-20 ENCOUNTER — SURGERY (OUTPATIENT)
Age: 48
End: 2018-11-20

## 2018-11-20 ENCOUNTER — HOSPITAL ENCOUNTER (OUTPATIENT)
Facility: CLINIC | Age: 48
Discharge: HOME | End: 2018-11-21
Attending: OBSTETRICS & GYNECOLOGY | Admitting: OBSTETRICS & GYNECOLOGY
Payer: COMMERCIAL

## 2018-11-20 ENCOUNTER — ANESTHESIA EVENT (OUTPATIENT)
Dept: SURGERY | Facility: CLINIC | Age: 48
End: 2018-11-20
Payer: COMMERCIAL

## 2018-11-20 DIAGNOSIS — Z98.890 POST-OPERATIVE STATE: Primary | ICD-10-CM

## 2018-11-20 LAB — B-HCG SERPL-ACNC: <1 IU/L (ref 0–5)

## 2018-11-20 PROCEDURE — 40000936 ZZH STATISTIC OUTPATIENT (NON-OBS) NIGHT

## 2018-11-20 PROCEDURE — 71000012 ZZH RECOVERY PHASE 1 LEVEL 1 FIRST HR: Performed by: OBSTETRICS & GYNECOLOGY

## 2018-11-20 PROCEDURE — 36415 COLL VENOUS BLD VENIPUNCTURE: CPT | Performed by: OBSTETRICS & GYNECOLOGY

## 2018-11-20 PROCEDURE — 25000125 ZZHC RX 250: Performed by: OBSTETRICS & GYNECOLOGY

## 2018-11-20 PROCEDURE — 37000009 ZZH ANESTHESIA TECHNICAL FEE, EACH ADDTL 15 MIN: Performed by: OBSTETRICS & GYNECOLOGY

## 2018-11-20 PROCEDURE — 25000128 H RX IP 250 OP 636: Performed by: ANESTHESIOLOGY

## 2018-11-20 PROCEDURE — 27210794 ZZH OR GENERAL SUPPLY STERILE: Performed by: OBSTETRICS & GYNECOLOGY

## 2018-11-20 PROCEDURE — 25000125 ZZHC RX 250: Performed by: NURSE ANESTHETIST, CERTIFIED REGISTERED

## 2018-11-20 PROCEDURE — 25000566 ZZH SEVOFLURANE, EA 15 MIN: Performed by: OBSTETRICS & GYNECOLOGY

## 2018-11-20 PROCEDURE — 71000013 ZZH RECOVERY PHASE 1 LEVEL 1 EA ADDTL HR: Performed by: OBSTETRICS & GYNECOLOGY

## 2018-11-20 PROCEDURE — 25800025 ZZH RX 258: Performed by: OBSTETRICS & GYNECOLOGY

## 2018-11-20 PROCEDURE — 36000093 ZZH SURGERY LEVEL 4 1ST 30 MIN: Performed by: OBSTETRICS & GYNECOLOGY

## 2018-11-20 PROCEDURE — 84702 CHORIONIC GONADOTROPIN TEST: CPT | Performed by: OBSTETRICS & GYNECOLOGY

## 2018-11-20 PROCEDURE — 36000063 ZZH SURGERY LEVEL 4 EA 15 ADDTL MIN: Performed by: OBSTETRICS & GYNECOLOGY

## 2018-11-20 PROCEDURE — 40000935 ZZH STATISTIC OUTPATIENT (NON-OBS) EVE

## 2018-11-20 PROCEDURE — 58573 TLH W/T/O UTERUS OVER 250 G: CPT | Performed by: OBSTETRICS & GYNECOLOGY

## 2018-11-20 PROCEDURE — 88307 TISSUE EXAM BY PATHOLOGIST: CPT | Mod: 26 | Performed by: OBSTETRICS & GYNECOLOGY

## 2018-11-20 PROCEDURE — 25000128 H RX IP 250 OP 636: Performed by: NURSE ANESTHETIST, CERTIFIED REGISTERED

## 2018-11-20 PROCEDURE — 37000008 ZZH ANESTHESIA TECHNICAL FEE, 1ST 30 MIN: Performed by: OBSTETRICS & GYNECOLOGY

## 2018-11-20 PROCEDURE — 40000305 ZZH STATISTIC PRE PROC ASSESS I: Performed by: OBSTETRICS & GYNECOLOGY

## 2018-11-20 PROCEDURE — 88307 TISSUE EXAM BY PATHOLOGIST: CPT | Performed by: OBSTETRICS & GYNECOLOGY

## 2018-11-20 PROCEDURE — 25000128 H RX IP 250 OP 636: Performed by: OBSTETRICS & GYNECOLOGY

## 2018-11-20 PROCEDURE — 25000132 ZZH RX MED GY IP 250 OP 250 PS 637: Performed by: OBSTETRICS & GYNECOLOGY

## 2018-11-20 RX ORDER — DEXAMETHASONE SODIUM PHOSPHATE 4 MG/ML
INJECTION, SOLUTION INTRA-ARTICULAR; INTRALESIONAL; INTRAMUSCULAR; INTRAVENOUS; SOFT TISSUE PRN
Status: DISCONTINUED | OUTPATIENT
Start: 2018-11-20 | End: 2018-11-20

## 2018-11-20 RX ORDER — HYDROMORPHONE HYDROCHLORIDE 1 MG/ML
0.2 INJECTION, SOLUTION INTRAMUSCULAR; INTRAVENOUS; SUBCUTANEOUS
Status: DISCONTINUED | OUTPATIENT
Start: 2018-11-20 | End: 2018-11-21 | Stop reason: HOSPADM

## 2018-11-20 RX ORDER — FUROSEMIDE 10 MG/ML
INJECTION INTRAMUSCULAR; INTRAVENOUS PRN
Status: DISCONTINUED | OUTPATIENT
Start: 2018-11-20 | End: 2018-11-20

## 2018-11-20 RX ORDER — MEPERIDINE HYDROCHLORIDE 50 MG/ML
12.5 INJECTION INTRAMUSCULAR; INTRAVENOUS; SUBCUTANEOUS
Status: DISCONTINUED | OUTPATIENT
Start: 2018-11-20 | End: 2018-11-20 | Stop reason: HOSPADM

## 2018-11-20 RX ORDER — ONDANSETRON 2 MG/ML
4 INJECTION INTRAMUSCULAR; INTRAVENOUS EVERY 6 HOURS PRN
Status: DISCONTINUED | OUTPATIENT
Start: 2018-11-20 | End: 2018-11-21 | Stop reason: HOSPADM

## 2018-11-20 RX ORDER — PROPOFOL 10 MG/ML
INJECTION, EMULSION INTRAVENOUS PRN
Status: DISCONTINUED | OUTPATIENT
Start: 2018-11-20 | End: 2018-11-20

## 2018-11-20 RX ORDER — PHENAZOPYRIDINE HYDROCHLORIDE 200 MG/1
200 TABLET, FILM COATED ORAL ONCE
Status: COMPLETED | OUTPATIENT
Start: 2018-11-20 | End: 2018-11-20

## 2018-11-20 RX ORDER — SCOLOPAMINE TRANSDERMAL SYSTEM 1 MG/1
PATCH, EXTENDED RELEASE TRANSDERMAL PRN
Status: DISCONTINUED | OUTPATIENT
Start: 2018-11-20 | End: 2018-11-20

## 2018-11-20 RX ORDER — OXYCODONE HYDROCHLORIDE 5 MG/1
5-10 TABLET ORAL
Qty: 16 TABLET | Refills: 0 | Status: SHIPPED | OUTPATIENT
Start: 2018-11-20 | End: 2022-04-22

## 2018-11-20 RX ORDER — HYDROMORPHONE HYDROCHLORIDE 1 MG/ML
.3-.5 INJECTION, SOLUTION INTRAMUSCULAR; INTRAVENOUS; SUBCUTANEOUS EVERY 10 MIN PRN
Status: DISCONTINUED | OUTPATIENT
Start: 2018-11-20 | End: 2018-11-20 | Stop reason: HOSPADM

## 2018-11-20 RX ORDER — ONDANSETRON 4 MG/1
4 TABLET, ORALLY DISINTEGRATING ORAL EVERY 6 HOURS PRN
Status: DISCONTINUED | OUTPATIENT
Start: 2018-11-20 | End: 2018-11-21 | Stop reason: HOSPADM

## 2018-11-20 RX ORDER — ONDANSETRON 2 MG/ML
INJECTION INTRAMUSCULAR; INTRAVENOUS PRN
Status: DISCONTINUED | OUTPATIENT
Start: 2018-11-20 | End: 2018-11-20

## 2018-11-20 RX ORDER — SODIUM CHLORIDE, SODIUM LACTATE, POTASSIUM CHLORIDE, CALCIUM CHLORIDE 600; 310; 30; 20 MG/100ML; MG/100ML; MG/100ML; MG/100ML
INJECTION, SOLUTION INTRAVENOUS CONTINUOUS
Status: DISCONTINUED | OUTPATIENT
Start: 2018-11-20 | End: 2018-11-21 | Stop reason: HOSPADM

## 2018-11-20 RX ORDER — NEOSTIGMINE METHYLSULFATE 1 MG/ML
VIAL (ML) INJECTION PRN
Status: DISCONTINUED | OUTPATIENT
Start: 2018-11-20 | End: 2018-11-20

## 2018-11-20 RX ORDER — FENTANYL CITRATE 50 UG/ML
25-50 INJECTION, SOLUTION INTRAMUSCULAR; INTRAVENOUS
Status: DISCONTINUED | OUTPATIENT
Start: 2018-11-20 | End: 2018-11-20 | Stop reason: HOSPADM

## 2018-11-20 RX ORDER — DIMENHYDRINATE 50 MG/ML
25 INJECTION, SOLUTION INTRAMUSCULAR; INTRAVENOUS
Status: DISCONTINUED | OUTPATIENT
Start: 2018-11-20 | End: 2018-11-20 | Stop reason: HOSPADM

## 2018-11-20 RX ORDER — IBUPROFEN 600 MG/1
600 TABLET, FILM COATED ORAL EVERY 6 HOURS PRN
Qty: 30 TABLET | Refills: 0 | COMMUNITY
Start: 2018-11-20 | End: 2022-04-22

## 2018-11-20 RX ORDER — BUPIVACAINE HYDROCHLORIDE 2.5 MG/ML
INJECTION, SOLUTION EPIDURAL; INFILTRATION; INTRACAUDAL PRN
Status: DISCONTINUED | OUTPATIENT
Start: 2018-11-20 | End: 2018-11-20 | Stop reason: HOSPADM

## 2018-11-20 RX ORDER — CEFAZOLIN SODIUM 1 G/3ML
1 INJECTION, POWDER, FOR SOLUTION INTRAMUSCULAR; INTRAVENOUS SEE ADMIN INSTRUCTIONS
Status: DISCONTINUED | OUTPATIENT
Start: 2018-11-20 | End: 2018-11-20 | Stop reason: HOSPADM

## 2018-11-20 RX ORDER — LIDOCAINE 40 MG/G
CREAM TOPICAL
Status: DISCONTINUED | OUTPATIENT
Start: 2018-11-20 | End: 2018-11-21 | Stop reason: HOSPADM

## 2018-11-20 RX ORDER — METOPROLOL TARTRATE 1 MG/ML
1-2 INJECTION, SOLUTION INTRAVENOUS EVERY 5 MIN PRN
Status: DISCONTINUED | OUTPATIENT
Start: 2018-11-20 | End: 2018-11-20 | Stop reason: HOSPADM

## 2018-11-20 RX ORDER — ACETAMINOPHEN 325 MG/1
650 TABLET ORAL EVERY 4 HOURS PRN
Qty: 100 TABLET | Refills: 0 | COMMUNITY
Start: 2018-11-20 | End: 2022-04-22

## 2018-11-20 RX ORDER — SODIUM CHLORIDE, SODIUM LACTATE, POTASSIUM CHLORIDE, CALCIUM CHLORIDE 600; 310; 30; 20 MG/100ML; MG/100ML; MG/100ML; MG/100ML
INJECTION, SOLUTION INTRAVENOUS CONTINUOUS
Status: DISCONTINUED | OUTPATIENT
Start: 2018-11-20 | End: 2018-11-20 | Stop reason: HOSPADM

## 2018-11-20 RX ORDER — ONDANSETRON 2 MG/ML
4 INJECTION INTRAMUSCULAR; INTRAVENOUS EVERY 30 MIN PRN
Status: DISCONTINUED | OUTPATIENT
Start: 2018-11-20 | End: 2018-11-20 | Stop reason: HOSPADM

## 2018-11-20 RX ORDER — PROPOFOL 10 MG/ML
INJECTION, EMULSION INTRAVENOUS CONTINUOUS PRN
Status: DISCONTINUED | OUTPATIENT
Start: 2018-11-20 | End: 2018-11-20

## 2018-11-20 RX ORDER — OXYCODONE HYDROCHLORIDE 5 MG/1
5-10 TABLET ORAL
Status: DISCONTINUED | OUTPATIENT
Start: 2018-11-20 | End: 2018-11-21 | Stop reason: HOSPADM

## 2018-11-20 RX ORDER — IBUPROFEN 600 MG/1
600 TABLET, FILM COATED ORAL EVERY 6 HOURS PRN
Status: DISCONTINUED | OUTPATIENT
Start: 2018-11-21 | End: 2018-11-21 | Stop reason: HOSPADM

## 2018-11-20 RX ORDER — FENTANYL CITRATE 50 UG/ML
INJECTION, SOLUTION INTRAMUSCULAR; INTRAVENOUS PRN
Status: DISCONTINUED | OUTPATIENT
Start: 2018-11-20 | End: 2018-11-20

## 2018-11-20 RX ORDER — ONDANSETRON 4 MG/1
4 TABLET, ORALLY DISINTEGRATING ORAL EVERY 30 MIN PRN
Status: DISCONTINUED | OUTPATIENT
Start: 2018-11-20 | End: 2018-11-20 | Stop reason: HOSPADM

## 2018-11-20 RX ORDER — LIDOCAINE 40 MG/G
CREAM TOPICAL
Status: DISCONTINUED | OUTPATIENT
Start: 2018-11-20 | End: 2018-11-20 | Stop reason: HOSPADM

## 2018-11-20 RX ORDER — CEFAZOLIN SODIUM 2 G/100ML
2 INJECTION, SOLUTION INTRAVENOUS
Status: COMPLETED | OUTPATIENT
Start: 2018-11-20 | End: 2018-11-20

## 2018-11-20 RX ORDER — BUPIVACAINE HYDROCHLORIDE AND EPINEPHRINE 5; 5 MG/ML; UG/ML
INJECTION, SOLUTION PERINEURAL PRN
Status: DISCONTINUED | OUTPATIENT
Start: 2018-11-20 | End: 2018-11-20 | Stop reason: HOSPADM

## 2018-11-20 RX ORDER — NALOXONE HYDROCHLORIDE 0.4 MG/ML
.1-.4 INJECTION, SOLUTION INTRAMUSCULAR; INTRAVENOUS; SUBCUTANEOUS
Status: DISCONTINUED | OUTPATIENT
Start: 2018-11-20 | End: 2018-11-21 | Stop reason: HOSPADM

## 2018-11-20 RX ORDER — LIDOCAINE HYDROCHLORIDE 10 MG/ML
INJECTION, SOLUTION INFILTRATION; PERINEURAL PRN
Status: DISCONTINUED | OUTPATIENT
Start: 2018-11-20 | End: 2018-11-20

## 2018-11-20 RX ORDER — GLYCOPYRROLATE 0.2 MG/ML
INJECTION, SOLUTION INTRAMUSCULAR; INTRAVENOUS PRN
Status: DISCONTINUED | OUTPATIENT
Start: 2018-11-20 | End: 2018-11-20

## 2018-11-20 RX ORDER — ALBUTEROL SULFATE 0.83 MG/ML
2.5 SOLUTION RESPIRATORY (INHALATION) EVERY 4 HOURS PRN
Status: DISCONTINUED | OUTPATIENT
Start: 2018-11-20 | End: 2018-11-20 | Stop reason: HOSPADM

## 2018-11-20 RX ORDER — KETOROLAC TROMETHAMINE 30 MG/ML
30 INJECTION, SOLUTION INTRAMUSCULAR; INTRAVENOUS EVERY 6 HOURS
Status: COMPLETED | OUTPATIENT
Start: 2018-11-20 | End: 2018-11-21

## 2018-11-20 RX ORDER — NALOXONE HYDROCHLORIDE 0.4 MG/ML
.1-.4 INJECTION, SOLUTION INTRAMUSCULAR; INTRAVENOUS; SUBCUTANEOUS
Status: DISCONTINUED | OUTPATIENT
Start: 2018-11-20 | End: 2018-11-20 | Stop reason: HOSPADM

## 2018-11-20 RX ORDER — ACETAMINOPHEN 325 MG/1
650 TABLET ORAL EVERY 6 HOURS PRN
Status: DISCONTINUED | OUTPATIENT
Start: 2018-11-20 | End: 2018-11-21

## 2018-11-20 RX ADMIN — OXYCODONE HYDROCHLORIDE 5 MG: 5 TABLET ORAL at 14:53

## 2018-11-20 RX ADMIN — ROCURONIUM BROMIDE 10 MG: 10 INJECTION INTRAVENOUS at 10:17

## 2018-11-20 RX ADMIN — HYDROMORPHONE HYDROCHLORIDE 0.5 MG: 1 INJECTION, SOLUTION INTRAMUSCULAR; INTRAVENOUS; SUBCUTANEOUS at 09:07

## 2018-11-20 RX ADMIN — Medication 4 MG: at 11:34

## 2018-11-20 RX ADMIN — CEFAZOLIN 1 G: 1 INJECTION, POWDER, FOR SOLUTION INTRAMUSCULAR; INTRAVENOUS at 11:07

## 2018-11-20 RX ADMIN — SODIUM CHLORIDE, POTASSIUM CHLORIDE, SODIUM LACTATE AND CALCIUM CHLORIDE: 600; 310; 30; 20 INJECTION, SOLUTION INTRAVENOUS at 10:36

## 2018-11-20 RX ADMIN — KETOROLAC TROMETHAMINE 30 MG: 30 INJECTION, SOLUTION INTRAMUSCULAR at 21:39

## 2018-11-20 RX ADMIN — FENTANYL CITRATE 100 MCG: 50 INJECTION, SOLUTION INTRAMUSCULAR; INTRAVENOUS at 08:44

## 2018-11-20 RX ADMIN — HYDROMORPHONE HYDROCHLORIDE 0.5 MG: 1 INJECTION, SOLUTION INTRAMUSCULAR; INTRAVENOUS; SUBCUTANEOUS at 11:21

## 2018-11-20 RX ADMIN — ROCURONIUM BROMIDE 50 MG: 10 INJECTION INTRAVENOUS at 08:44

## 2018-11-20 RX ADMIN — SCOPALAMINE 1 PATCH: 1 PATCH, EXTENDED RELEASE TRANSDERMAL at 08:16

## 2018-11-20 RX ADMIN — KETOROLAC TROMETHAMINE 30 MG: 30 INJECTION, SOLUTION INTRAMUSCULAR at 15:37

## 2018-11-20 RX ADMIN — OXYCODONE HYDROCHLORIDE 10 MG: 5 TABLET ORAL at 20:57

## 2018-11-20 RX ADMIN — SODIUM CHLORIDE 600 ML: 900 IRRIGANT IRRIGATION at 11:27

## 2018-11-20 RX ADMIN — ROCURONIUM BROMIDE 10 MG: 10 INJECTION INTRAVENOUS at 10:01

## 2018-11-20 RX ADMIN — MIDAZOLAM 2 MG: 1 INJECTION INTRAMUSCULAR; INTRAVENOUS at 08:39

## 2018-11-20 RX ADMIN — SODIUM CHLORIDE, POTASSIUM CHLORIDE, SODIUM LACTATE AND CALCIUM CHLORIDE: 600; 310; 30; 20 INJECTION, SOLUTION INTRAVENOUS at 14:54

## 2018-11-20 RX ADMIN — PHENAZOPYRIDINE HYDROCHLORIDE 200 MG: 200 TABLET, FILM COATED ORAL at 07:46

## 2018-11-20 RX ADMIN — FUROSEMIDE 10 MG: 10 INJECTION, SOLUTION INTRAVENOUS at 11:31

## 2018-11-20 RX ADMIN — WATER 100 ML: 100 IRRIGANT IRRIGATION at 11:46

## 2018-11-20 RX ADMIN — GLYCOPYRROLATE 0.7 MG: 0.2 INJECTION, SOLUTION INTRAMUSCULAR; INTRAVENOUS at 11:34

## 2018-11-20 RX ADMIN — GLYCOPYRROLATE 0.2 MG: 0.2 INJECTION, SOLUTION INTRAMUSCULAR; INTRAVENOUS at 08:44

## 2018-11-20 RX ADMIN — BUPIVACAINE HYDROCHLORIDE AND EPINEPHRINE BITARTRATE 10 ML: 5; .005 INJECTION, SOLUTION EPIDURAL; INTRACAUDAL; PERINEURAL at 11:28

## 2018-11-20 RX ADMIN — CEFAZOLIN SODIUM 2 G: 2 INJECTION, SOLUTION INTRAVENOUS at 08:48

## 2018-11-20 RX ADMIN — SODIUM CHLORIDE, POTASSIUM CHLORIDE, SODIUM LACTATE AND CALCIUM CHLORIDE: 600; 310; 30; 20 INJECTION, SOLUTION INTRAVENOUS at 08:39

## 2018-11-20 RX ADMIN — PROPOFOL 200 MG: 10 INJECTION, EMULSION INTRAVENOUS at 08:44

## 2018-11-20 RX ADMIN — SODIUM CHLORIDE, POTASSIUM CHLORIDE, SODIUM LACTATE AND CALCIUM CHLORIDE: 600; 310; 30; 20 INJECTION, SOLUTION INTRAVENOUS at 13:21

## 2018-11-20 RX ADMIN — ONDANSETRON 4 MG: 2 INJECTION INTRAMUSCULAR; INTRAVENOUS at 11:23

## 2018-11-20 RX ADMIN — DEXAMETHASONE SODIUM PHOSPHATE 8 MG: 4 INJECTION, SOLUTION INTRA-ARTICULAR; INTRALESIONAL; INTRAMUSCULAR; INTRAVENOUS; SOFT TISSUE at 08:44

## 2018-11-20 RX ADMIN — HYDROMORPHONE HYDROCHLORIDE 0.5 MG: 1 INJECTION, SOLUTION INTRAMUSCULAR; INTRAVENOUS; SUBCUTANEOUS at 09:24

## 2018-11-20 RX ADMIN — BUPIVACAINE HYDROCHLORIDE 30 ML: 2.5 INJECTION, SOLUTION EPIDURAL; INFILTRATION; INTRACAUDAL; PERINEURAL at 11:27

## 2018-11-20 RX ADMIN — ROCURONIUM BROMIDE 10 MG: 10 INJECTION INTRAVENOUS at 10:35

## 2018-11-20 RX ADMIN — PROPOFOL 40 MCG/KG/MIN: 10 INJECTION, EMULSION INTRAVENOUS at 08:58

## 2018-11-20 RX ADMIN — ROCURONIUM BROMIDE 10 MG: 10 INJECTION INTRAVENOUS at 11:05

## 2018-11-20 RX ADMIN — OXYCODONE HYDROCHLORIDE 10 MG: 5 TABLET ORAL at 17:53

## 2018-11-20 RX ADMIN — LIDOCAINE HYDROCHLORIDE 30 MG: 10 INJECTION, SOLUTION INFILTRATION; PERINEURAL at 08:44

## 2018-11-20 ASSESSMENT — ACTIVITIES OF DAILY LIVING (ADL)
FALL_HISTORY_WITHIN_LAST_SIX_MONTHS: NO
COGNITION: 0 - NO COGNITION ISSUES REPORTED
DRESS: 0-->INDEPENDENT
RETIRED_COMMUNICATION: 0-->UNDERSTANDS/COMMUNICATES WITHOUT DIFFICULTY
BATHING: 0-->INDEPENDENT
TRANSFERRING: 0-->INDEPENDENT
RETIRED_EATING: 0-->INDEPENDENT
SWALLOWING: 0-->SWALLOWS FOODS/LIQUIDS WITHOUT DIFFICULTY
AMBULATION: 0-->INDEPENDENT
TOILETING: 0-->INDEPENDENT

## 2018-11-20 NOTE — ANESTHESIA POSTPROCEDURE EVALUATION
Patient: Sha Vidal    Procedure(s):  total laparoscopic hysterectomy, bilateral salpingectomy    Diagnosis:abnormal uterine bleeding with enlarged uterine fibroid  Diagnosis Additional Information: enlarged fibroid uterus, abnormal uterine bleeding with profound anemia unresponsive to conservative therapy    Anesthesia Type:  General, ETT    Note:  Anesthesia Post Evaluation    Patient location during evaluation: PACU  Patient participation: Able to fully participate in evaluation  Level of consciousness: awake  Pain management: adequate  Airway patency: patent  Cardiovascular status: acceptable  Respiratory status: acceptable  Hydration status: acceptable  PONV: controlled     Anesthetic complications: None          Last vitals:  Vitals:    11/20/18 1225 11/20/18 1230 11/20/18 1235   BP: 113/63 110/66 108/65   Resp: 8 8 9   Temp:      SpO2: 90% 96% 96%         Electronically Signed By: Kelby Quintero DO  November 20, 2018  1:11 PM

## 2018-11-20 NOTE — OP NOTE
Procedure Date: 11/20/2018      PREOPERATIVE DIAGNOSES:  Persistent abnormal uterine bleeding, unresponsive to conservative management, enlarged fibroid uterus, profound anemia.      POSTOPERATIVE DIAGNOSES:  Persistent abnormal uterine bleeding, unresponsive to conservative management, enlarged fibroid uterus, profound anemia.      PROCEDURE:  Exam under anesthesia, total laparoscopic hysterectomy, bilateral salpingectomy.      SURGEON:  Neptali Fournier MD.      ASSISTANT:  JM Abbott.      HISTORY OF PRESENT ILLNESS:  The patient is a 48-year-old white female, para 3-0-4-3, tubal sterilization for contraception, who I was asked to see by JM Chapman for evaluation of menorrhagia and associated anemia that has been getting progressively worse.  Extensive evaluation has been undertaken.  In 04/2008 she was noted to be profoundly anemic with a hemoglobin of 8.5.  Ultrasound revealed an enlarged fibroid uterus.  Endometrial biopsy was also benign.  I reviewed the findings with the patient at length on multiple occasions and discussed with her the risks, benefits and alternative forms of therapy.  After these lengthy discussions, the patient has made a decision to proceed with a total laparoscopic hysterectomy, bilateral salpingectomy and if the ovaries appear to be normal, ovarian preservation.  I think she has a good understanding of the nature of the problem, the nature of the procedure, the risks, the benefits and the alternatives, benign versus malignant possibilities.  All of her questions have been answered and informed consent has been obtained.      DETAILS OF PROCEDURE:  After obtaining informed consent, the patient was taken to the operating room where a general anesthetic with endotracheal intubation was performed.  Pneumatic stockings were placed prior to surgery.  The patient was placed in the dorsal lithotomy position in [facility] with legs approximately 30 degrees to the  horizontal.  An exam under anesthesia was performed.  External genitalia and BUS were within normal limits.  Speculum exam:  Multiparous appearing cervix.  Bimanual exam:  The uterus is consistent with 14 to 16 week size, adnexa without masses or enlargement.  Rectovaginal exam confirms.        OPERATIVE FINDINGS:  Exam under anesthesia was as noted above.  Diagnostic laparoscopy reveals left fallopian tube is status post partial salpingectomy.  The left ovary appears to be normal.  I did not see any endometriosis, excrescences or signs of malignancy.  Left round ligament is normal.  Uterus is enlarged to approximately 14 to 16 weeks size, somewhat irregular with suggestion of fibromata.  Right round ligament is within normal limits.  The right fallopian tube is status post sterilization procedure.  The fimbriated end is normal.  The right ovary is within normal limits.  I do not see any endometriosis, excrescences, adhesions or signs of malignancy.  Posterior aspect of the uterus and cul-de-sac are unremarkable.  The patient is status post appendectomy, liver edge and gallbladder appeared to be within normal limits.  Cystoscopy:  Please make note that the cystoscopy was done at the completion of the procedure.  We used a 20-Jamaican scope and sterile water was as our distending medium.  Urethra is within normal limits.  The trigone of the bladder is within normal limits.  The right and left ureteral orifices are seen.  There was bilateral efflux of urine.  The inferior, medial, lateral, superior, anterior and apical portions of bladder mucosa are all within normal limits.  I did not see any neovascularization, mosaicism, signs of malignancy, compromise or foreign bodies within the lumen.      DETAILS OF PROCEDURE:  After obtaining informed consent, the patient was taken to the operating room where a general anesthetic with endotracheal intubation was performed.  Pneumatic stockings had been placed preoperatively.   She was placed in the dorsal lithotomy position in [facility] with legs approximately 30 degrees to the horizontal.  An exam under anesthesia was performed with the above findings.  Following this, the patient was prepped and draped in the usual sterile fashion.  A hard stop was performed.  A bivalve speculum was placed in the vagina, the cervix visualized, grasped with a tenaculum and a figure-of-X suture of #1 Vicryl placed through the anterior lip of the cervix.  The large VCare cup was then brought into the operative field.  The endometrial cavity sounded to 10 cm.  The VCare cannula was placed within the endocervical canal into the endometrial cavity and the balloon inflated.  The VCare green cup was then placed around the cervix and secured with the previously placed stay suture of #1 Vicryl.  The blue stay ring was placed and a sponge and glove were placed in the vagina to maintain pneumoperitoneum.  A Saini catheter was placed.  The instruments were removed sterilely.  Subumbilical ligaments were injected with approximately 2 to 3 mL of 0.5% Marcaine with epinephrine, grasped with Bob clamps and tented.  We went through the previous left upper quadrant abdominal incision as the patient had a previous umbilical hernia repair with mesh done robotically by Dr. Hall.  The Veress needle was placed while tenting the abdominal wall with proper position confirmed with free flow saline technique.  The abdomen was inflated with 3 liters of CO2 gas.  Opening pressure was less than 10 mmHg.  While tenting the abdominal wall, we then placed a 5 mm nonbladed trocar under direct visualization without difficulty.  Inspection of the pelvis was made.  There were some omental adhesions to the right upper quadrant of the abdomen that did not interfere with our operative field.  A second 5 mm nonbladed trocar was placed in the right lower quadrant 2 fingerbreadths superior to the iliac crest, lateral to the rectus  abdominis muscles after infiltrating the skin and subcutaneous tissues with approximately 2 to 3 mL of 0.5% Marcaine with epinephrine solution.  A 12 mm nonbladed trocar was placed in the left lower quadrant 2 fingerbreadths superior to the iliac crest, lateral to the rectus abdominis muscles, after infiltrating the skin and subcutaneous tissues with approximately 3 to 4 mL of 0.5% Marcaine with epinephrine solution.  The LigaSure device was brought into the right lower quadrant, a Prestige forceps in the left lower port.  With the uterus placed cephalad and deviated to the patient's right, I was able to identify the distal end of the right fallopian tube.  The distal end of the tube was grasped and gentle superior medial traction was applied, isolating the mesosalpinx.  The mesosalpinx was taken down in successive bites with the LigaSure device, the pedicles cauterized and divided under direct visualization.  The distal segment of fallopian tube was removed and submitted to pathology.  The right round ligament was identified, crossclamped with the LigaSure device, cauterized and divided.  The right utero-ovarian ligament was identified, crossclamped, cauterized and divided.  The remaining portions of broad and cardinal ligament on the right side were then taken down in successive bites, the pedicles cauterized with the LigaSure device and divided under direct visualization.  Dissection was carried down to the level of the uterosacral ligaments.  The vesicouterine reflection was identified, incised in a right to left manner and the bladder dissected meticulously off the lower uterine segment and proximal vagina.  I was able to see the outline of the VCare cup through the vaginal epithelium.  Attention was then turned to the left adnexal structures.  The distal left fallopian tube was identified.  Again, superior medial traction applied, isolating the mesosalpinx.  The mesosalpinx was taken down in successive bites  with the LigaSure device, the pedicles cauterized and divided under direct visualization.  The distal end of the fallopian tube was removed from the operative field and submitted to pathology.  The left round ligament was identified, crossclamped, cauterized and divided with the LigaSure device.  The left utero-ovarian ligament was identified, cauterized crossclamped and divided under direct visualization.  The remaining portions of broad and cardinal ligament on the left side were then taken down in successive bites to the level of the uterosacral ligament, the pedicles cauterized with the LigaSure device and divided under direct visualization.  The posterior peritoneal reflection was incised in a left-to-right manner and deflected distally.  We were then able to isolate the outline of the VCare cup.  The Harmonic scalpel was then brought into the operative field.  Colpotomy was performed in a circumferential manner.  The uterus was removed through the vagina using uterine incisions to aid with the removal.  The pelvis was suctioned of excess clot and heme, a sponge with a glove placed in the vagina to maintain pneumoperitoneum.  Pedicles were inspected, found to be hemostatic.  There was no injury to bowel, bladder or other intra-abdominal retroperitoneal structures during the removal of the uterus.  The vagina was closed in the following manner.  The right and left lateral margins were closed with an interrupted suture of #1 V-Loc material incorporating the anterior fibromuscular layer anteriorly on the right side, the vaginal epithelium anteriorly, the posterior vaginal epithelium, the rectovaginal fascia and the right uterosacral ligament.  The suture was then secured.  A second suture of 0 V-Loc material was then used on the left lateral margin, incorporating the anterior fibromuscular layer, the vaginal epithelium anteriorly and posteriorly, the rectovaginal fascia and uterosacral ligament on the left side.   This suture was then secured.  Beginning at the left side, we did a running suture of 0 V-Loc material using the previously placed stitch to the level of the right uterosacral ligament.  The suture was then brought back to the midline.  This resulted in a good hemostatic closure with good support.  Excess suture material was removed.  Using the 0 V-Loc material from the right lateral margin, a second imbricating suture was placed beginning at the right lateral margin, proceeding to the left lateral margin and then again back to the midline.  This resulted in a firm closure of the vaginal cuff with DeLancey level 1 level of support.  Pedicles were inspected and found to be hemostatic.  Pelvis was suctioned of excess clot and heme.  The left lower trocar was removed and the fascial defect closed with a Filippo-Arielle closure device with an interrupted suture of #1 Vicryl.  This resulted in a good hemostatic airtight reapproximation with no palpable defects.  30 mL of 0.25% Marcaine plain were placed in the pelvis for additional analgesia.  At this point, we paused, we documented the sponge, needle, instrument counts had all been checked and were correct x2 and hemostasis had been checked and was acceptable.  With counts correct and hemostasis acceptable, the abdomen was deflated of CO2 gas and skin closed with subcuticular sutures of 4-0 Monocryl.  The Saini catheter was removed.  Cystoscopy was performed with the above findings.  With a normal cystoscopy in hand, the bladder was drained of urine and a new Saini catheter was placed.  A bimanual examination revealed excellent apical compartment support, anterior compartment support with DeLancey level 1 level of apical support.  Rectovaginal exam revealed no evidence of compromise to the rectal lumen or evidence of foreign body within the lumen.  At the completion of the procedure with sponge, needle and instrument counts checked and correct x2, hemostasis checked and  acceptable, the patient was taken out of dorsal lithotomy position, awoken and returned to the recovery area in good condition.      ESTIMATED BLOOD LOSS:  200 mL.         MARTÍN BEAN MD             D: 2018   T: 2018   MT: ZEUS      Name:     DOLLY ESTRADA   MRN:      [id number]        Account:        [id number]   :      1970           Procedure Date: 2018      Document: [id number]

## 2018-11-20 NOTE — IP AVS SNAPSHOT
[facility]    [address]    [facility] [address]    Phone:  [phone]                                       After Visit Summary   11/20/2018    Sha Vidal    MRN: [id number]           After Visit Summary Signature Page     I have received my discharge instructions, and my questions have been answered. I have discussed any challenges I see with this plan with the nurse or doctor.    ..........................................................................................................................................  Patient/Patient Representative Signature      ..........................................................................................................................................  Patient Representative Print Name and Relationship to Patient    ..................................................               ................................................  Date                                   Time    ..........................................................................................................................................  Reviewed by Signature/Title    ...................................................              ..............................................  Date                                               Time          22EPIC Rev 08/18

## 2018-11-20 NOTE — IP AVS SNAPSHOT
MRN:[id number]                      After Visit Summary   11/20/2018    Sha Vidal    MRN: [id number]           Thank you!     Thank you for choosing [facility] for your care. Our goal is always to provide you with excellent care. Hearing back from our patients is one way we can continue to improve our services. Please take a few minutes to complete the written survey that you may receive in the mail after you visit. If you would like to speak to someone directly about your visit please contact Patient Relations at [phone]. Thank you!          Patient Information     Date Of Birth          1970        Designated Caregiver       Most Recent Value    Caregiver    Will someone help with your care after discharge? yes    Name of designated caregiver Andre    Phone number of caregiver [phone]    Caregiver address [place]      About your hospital stay     You were admitted on:  November 20, 2018 You last received care in the:  [facility] Observation Department    You were discharged on:  November 21, 2018       Who to Call     For medical emergencies, please call 911.  For non-urgent questions about your medical care, please call your primary care provider or clinic, [phone]  For questions related to your surgery, please call your surgery clinic        Attending Provider     Provider Specialty    Neptali Fournier MD OB/Gyn       Primary Care Provider Office Phone # Fax #    Anita Quiros PA-C [phone]      After Care Instructions     Diet Instructions       Resume pre-procedure diet            Discharge Instructions       Patient to follow up with appointment in 1-2 and 6-8  Weeks post op            Dressing       Keep dressing clean and dry.  Dressing / incisional care as instructed by RN and or Surgeon            Ice to affected area       Ice to operative site PRN            No lifting        No lifting over 15 lbs and no  "strenuous physical activity for 8 weeks  No lifting > 10 lbs for 6 wks  Take your tempature twice daily for 3 days and call if > 100.4  Nothing in vagina for 8 wks  Continue taking prenatal MVI daily for 1 month  Keep stools soft  Void q 2hrs while awake            Shower       No shower for 24 hours post procedure. May shower Postoperative Day (POD)  1                  Your next 10 appointments already scheduled     Nov 27, 2018  1:00 PM CST   [facility] with Neptali [facility]nier [facility]   [facility]    [address]  [address]  [facility] [address] [phone]                         Future tests that were ordered for you     Abdominal dressing victoria/binder                 Further instructions from your care team       Follow up with Dr. Fournier in the next 1-2 weeks.   Nothing in the vagina for 8 weeks.   Do not lift over 15 pounds.   Call the office for any questions or concerns.     Also follow up on Friday at the office to remove moran catheter at 8:15AM.     Pending Results     No orders found for last 3 day(s).            Admission Information     Date & Time Provider Department Dept. Phone    11/20/2018 Neptali Fournier MD [facility] Observation Department [phone]      Your Vitals Were     Blood Pressure Temperature Respirations Height Weight Last Period    97/56 (BP Location: Left arm) 100.8  F (38.2  C) (Oral) 16 1.575 m (5' 2\") 80.3 kg (177 lb) 10/31/2018 (Exact Date)    Pulse Oximetry BMI (Body Mass Index)                90% 32.37 kg/m2          [employer] Information     [employer] gives you secure access to your electronic health record. If you see a primary care provider, you can also send messages to your care team and make appointments. If you have questions, please call your primary care clinic.  If you do not have a primary care provider, please call [phone] and they will assist you.        Care EveryWhere ID     This is your Care " EveryWhere ID. This could be used by other organizations to access your [facility] medical records  [id number]        Equal Access to Services     ROBSON CONLEY : Hadii aad ku hadaminataruby Dow, wagaldinoda nicolehaiha, natalieta kacarolda ladalton, morales keeshain hayaageraldine tovarharoon hedrick baldo donahue. So [facility] [phone].    ATENCIÓN: Si habla español, tiene a collins disposición servicios gratuitos de asistencia lingüística. Llame al [phone].    We comply with applicable federal civil rights laws and [place] laws. We do not discriminate on the basis of race, color, national origin, age, disability, sex, sexual orientation, or gender identity.               Review of your medicines      START taking        Dose / Directions    nitroFURantoin (macrocrystal-monohydrate) 100 MG capsule   Commonly known as:  MACROBID        Dose:  100 mg   Take 1 capsule (100 mg) by mouth 2 times daily   Quantity:  8 capsule   Refills:  0       oxyCODONE 5 MG tablet   Commonly known as:  ROXICODONE        Dose:  5-10 mg   Take 1-2 tablets (5-10 mg) by mouth every 3 hours as needed for pain (Moderate to Severe)   Quantity:  16 tablet   Refills:  0         CONTINUE these medicines which may have CHANGED, or have new prescriptions. If we are uncertain of the size of tablets/capsules you have at home, strength may be listed as something that might have changed.        Dose / Directions    * acetaminophen 325 MG tablet   Commonly known as:  TYLENOL   This may have changed:  Another medication with the same name was added. Make sure you understand how and when to take each.        Dose:  650 mg   Take 2 tablets (650 mg) by mouth every 4 hours as needed for other (mild pain)   Quantity:  100 tablet   Refills:  0       * acetaminophen 325 MG tablet   Commonly known as:  TYLENOL   This may have changed:  You were already taking a medication with the same name, and this prescription was added. Make sure you understand how and when to take each.        Dose:  650 mg    Take 2 tablets (650 mg) by mouth every 4 hours as needed for other (mild pain)   Quantity:  100 tablet   Refills:  0       * ibuprofen 600 MG tablet   Commonly known as:  ADVIL/MOTRIN   This may have changed:  Another medication with the same name was added. Make sure you understand how and when to take each.        Dose:  600 mg   Take 1 tablet (600 mg) by mouth every 6 hours as needed for pain (mild)   Quantity:  30 tablet   Refills:  0       * ibuprofen 600 MG tablet   Commonly known as:  ADVIL/MOTRIN   This may have changed:  You were already taking a medication with the same name, and this prescription was added. Make sure you understand how and when to take each.        Dose:  600 mg   Take 1 tablet (600 mg) by mouth every 6 hours as needed for pain (mild)   Quantity:  30 tablet   Refills:  0       * Notice:  This list has 4 medication(s) that are the same as other medications prescribed for you. Read the directions carefully, and ask your doctor or other care provider to review them with you.      CONTINUE these medicines which have NOT CHANGED        Dose / Directions    cetirizine 10 MG tablet   Commonly known as:  zyrTEC        Dose:  10 mg   Take 10 mg by mouth daily   Refills:  0       D 2000 2000 units tablet   Generic drug:  cholecalciferol        Dose:  2000 Units   Take 2,000 Units by mouth daily   Refills:  0       levothyroxine 25 MCG tablet   Commonly known as:  SYNTHROID/LEVOTHROID   Used for:  Hypothyroidism, unspecified type        TAKE 1 TABLET BY MOUTH  DAILY   Quantity:  90 tablet   Refills:  2       melatonin 3 MG tablet   Used for:  Insomnia, unspecified type        Dose:  5 mg   Take 1.5 tablets (4.5 mg) by mouth nightly as needed for sleep   Quantity:  30 tablet   Refills:  0       MULTI-VITAMINS Tabs        Dose:  1 tablet   Take 1 tablet by mouth daily   Refills:  0       sertraline 50 MG tablet   Commonly known as:  ZOLOFT   Used for:  Adjustment disorder with mixed anxiety and  depressed mood        Dose:  50 mg   Take 1 tablet (50 mg) by mouth daily   Quantity:  90 tablet   Refills:  11            Where to get your medicines      Some of these will need a paper prescription and others can be bought over the counter. Ask your nurse if you have questions.     Bring a paper prescription for each of these medications     nitroFURantoin (macrocrystal-monohydrate) 100 MG capsule    oxyCODONE 5 MG tablet       You don't need a prescription for these medications     acetaminophen 325 MG tablet    ibuprofen 600 MG tablet                Protect others around you: Learn how to safely use, store and throw away your medicines at www.disposemymeds.org.        Information about OPIOIDS     PRESCRIPTION OPIOIDS: WHAT YOU NEED TO KNOW   We gave you an opioid (narcotic) pain medicine. It is important to manage your pain, but opioids are not always the best choice. You should first try all the other options your care team gave you. Take this medicine for as short a time (and as few doses) as possible.    Some activities can increase your pain, such as bandage changes or therapy sessions. It may help to take your pain medicine 30 to 60 minutes before these activities. Reduce your stress by getting enough sleep, working on hobbies you enjoy and practicing relaxation or meditation. Talk to your care team about ways to manage your pain beyond prescription opioids.    These medicines have risks:    DO NOT drive when on new or higher doses of pain medicine. These medicines can affect your alertness and reaction times, and you could be arrested for driving under the influence (DUI). If you need to use opioids long-term, talk to your care team about driving.    DO NOT operate heavy machinery    DO NOT do any other dangerous activities while taking these medicines.    DO NOT drink any alcohol while taking these medicines.     If the opioid prescribed includes acetaminophen, DO NOT take with any other medicines that  contain acetaminophen. Read all labels carefully. Look for the word  acetaminophen  or  Tylenol.  Ask your pharmacist if you have questions or are unsure.    You can get addicted to pain medicines, especially if you have a history of addiction (chemical, alcohol or substance dependence). Talk to your care team about ways to reduce this risk.    All opioids tend to cause constipation. Drink plenty of water and eat foods that have a lot of fiber, such as fruits, vegetables, prune juice, apple juice and high-fiber cereal. Take a laxative (Miralax, milk of magnesia, Colace, Senna) if you don t move your bowels at least every other day. Other side effects include upset stomach, sleepiness, dizziness, throwing up, tolerance (needing more of the medicine to have the same effect), physical dependence and slowed breathing.    Store your pills in a secure place, locked if possible. We will not replace any lost or stolen medicine. If you don t finish your medicine, please throw away (dispose) as directed by your pharmacist. The [place] Pollution Control Agency has more information about safe disposal: https://www.[employer].[place].us/living-green/managing-unwanted-medications             Medication List: This is a list of all your medications and when to take them. Check marks below indicate your daily home schedule. Keep this list as a reference.      Medications           Morning Afternoon Evening Bedtime As Needed    * acetaminophen 325 MG tablet   Commonly known as:  TYLENOL   Take 2 tablets (650 mg) by mouth every 4 hours as needed for other (mild pain)                                * acetaminophen 325 MG tablet   Commonly known as:  TYLENOL   Take 2 tablets (650 mg) by mouth every 4 hours as needed for other (mild pain)                                cetirizine 10 MG tablet   Commonly known as:  zyrTEC   Take 10 mg by mouth daily                                D 2000 2000 units tablet   Take 2,000 Units by mouth daily    Generic drug:  cholecalciferol                                * ibuprofen 600 MG tablet   Commonly known as:  ADVIL/MOTRIN   Take 1 tablet (600 mg) by mouth every 6 hours as needed for pain (mild)                                * ibuprofen 600 MG tablet   Commonly known as:  ADVIL/MOTRIN   Take 1 tablet (600 mg) by mouth every 6 hours as needed for pain (mild)                                levothyroxine 25 MCG tablet   Commonly known as:  SYNTHROID/LEVOTHROID   TAKE 1 TABLET BY MOUTH  DAILY                                melatonin 3 MG tablet   Take 1.5 tablets (4.5 mg) by mouth nightly as needed for sleep                                MULTI-VITAMINS Tabs   Take 1 tablet by mouth daily                                nitroFURantoin (macrocrystal-monohydrate) 100 MG capsule   Commonly known as:  MACROBID   Take 1 capsule (100 mg) by mouth 2 times daily                                oxyCODONE 5 MG tablet   Commonly known as:  ROXICODONE   Take 1-2 tablets (5-10 mg) by mouth every 3 hours as needed for pain (Moderate to Severe)   Last time this was given:  10 mg on 11/21/2018  6:12 AM                                sertraline 50 MG tablet   Commonly known as:  ZOLOFT   Take 1 tablet (50 mg) by mouth daily                                * Notice:  This list has 4 medication(s) that are the same as other medications prescribed for you. Read the directions carefully, and ask your doctor or other care provider to review them with you.

## 2018-11-20 NOTE — PHARMACY-ADMISSION MEDICATION HISTORY
Pharmacy reviewed prior to admission med list from pre-admitting rn, NIC Garcia.      Prior to Admission medications    Medication Sig Last Dose Taking? Auth Provider   cetirizine (ZYRTEC) 10 MG tablet Take 10 mg by mouth daily 11/19/2018 at Unknown time Yes Reported, Patient   cholecalciferol (D 2000) 2000 UNITS tablet Take 2,000 Units by mouth daily  11/19/2018 at Unknown time Yes Reported, Patient   levothyroxine (SYNTHROID/LEVOTHROID) 25 MCG tablet TAKE 1 TABLET BY MOUTH  DAILY 11/19/2018 at Unknown time Yes Anita Quiros PA-C   melatonin 3 MG tablet Take 1.5 tablets (4.5 mg) by mouth nightly as needed for sleep 11/19/2018 at Unknown time Yes Anita Quiros PA-C   Multiple Vitamin (MULTI-VITAMINS) TABS Take 1 tablet by mouth daily  11/19/2018 at Unknown time Yes Reported, Patient   sertraline (ZOLOFT) 50 MG tablet Take 1 tablet (50 mg) by mouth daily 11/19/2018 at Unknown time Yes Anita Quiros PA-C   acetaminophen (TYLENOL) 325 MG tablet Take 2 tablets (650 mg) by mouth every 4 hours as needed for other (mild pain) More than a month at Unknown time  Neptali Fournier MD   ibuprofen (ADVIL/MOTRIN) 600 MG tablet Take 1 tablet (600 mg) by mouth every 6 hours as needed for pain (mild) More than a month at Unknown time  Neptali Fournier MD

## 2018-11-20 NOTE — ANESTHESIA PREPROCEDURE EVALUATION
PAC NOTE:       ANESTHESIA PRE EVALUATION:  Anesthesia Evaluation     .             ROS/MED HX    ENT/Pulmonary:  - neg pulmonary ROS     Neurologic:  - neg neurologic ROS     Cardiovascular:  - neg cardiovascular ROS       METS/Exercise Tolerance:     Hematologic: Comments: Factor 12 def, asymptomatic    (+) Anemia, -      Musculoskeletal:  - neg musculoskeletal ROS       GI/Hepatic:  - neg GI/hepatic ROS       Renal/Genitourinary:  - ROS Renal section negative       Endo: Comment: .Body mass index is 32.37 kg/(m^2).      (+) thyroid problem .      Psychiatric:     (+) psychiatric history depression      Infectious Disease:  - neg infectious disease ROS       Malignancy:         Other: Comment: .Lab Test        10/23/18     10/04/18     06/06/18     05/01/18                       1528          1446          1538          1624          WBC           --          8.3          9.5          8.6           HGB          11.0*        11.3*        11.8         10.5*         MCV           --          86           85           75*           PLT           --          367          313          398            Lab Test        04/17/18 07/17/17 07/09/17                       0755          0907          1925          NA           141          140          137           POTASSIUM    4.3          4.4          3.7           CHLORIDE     109          107          104           CO2          25           26           28            BUN          14           22           16            CR           0.71         0.83         0.90          ANIONGAP     7            7            5             AL          8.4*         8.6          8.5           GLC          87           89           177*                              Physical Exam  Normal systems: cardiovascular and pulmonary    Airway   Mallampati: II    Dental     Cardiovascular   Rhythm and rate: regular and normal      Pulmonary    breath sounds clear to auscultation              Anesthesia Plan      History & Physical Review  History and physical reviewed and following examination; no interval change.    ASA Status:  2 .        Plan for General and ETT with Intravenous induction. Maintenance will be Inhalation and Balanced.    PONV prophylaxis:  Ondansetron (or other 5HT-3) and Dexamethasone or Solumedrol       Postoperative Care  Postoperative pain management:  IV analgesics, Oral pain medications and Multi-modal analgesia.      Consents  Anesthetic plan, risks, benefits and alternatives discussed with:  Patient or representative..                            .

## 2018-11-20 NOTE — PROGRESS NOTES
Preop Diagnosis: enlarged fibroid uterus, abnormal uterine bleeding with profound anemia unresponsive to conservative therapy    Post-Op Diagnosis: same    Procedure: Exam under anesthesia, Total Laproscopic Hysterectomy and bilateral salpingectomy, cystoscopy    Surgeon: MARTÍN BEAN MD, Assistant: Brandi De La O CFA    EBL: 200 cc    Anesthesia: General and Local    Path: uterus and fallopian tubes    Complications: no problems reported    Findings: see dictated operative note for full details        MARTÍN BEAN MD  11:50 AM  11/20/2018

## 2018-11-20 NOTE — PLAN OF CARE
Problem: Patient Care Overview  Goal: Plan of Care/Patient Progress Review  Outcome: Improving  PRIMARY DIAGNOSIS: POST-OPERATIVE: Total laparoscopic hysterectomy, bilateral salpingectomy  OUTPATIENT/OBSERVATION GOALS TO BE MET BEFORE DISCHARGE:  1. Stable vital signs: Yes  2. Tolerating diet: Clear liquids for now  3. Pain controlled with oral pain medications:  No, on scheduled IV Toradol, prn po Oxycodone  4. Positive bowel sounds:  Yes, hypoactive, denies passing flatus.  5. Voiding without difficulty:  No - Indwelling moran catheter patent w/ adequate amt. of output.  6. Able to ambulate:  Yes  7. Provider specific discharge goals met:  No    Discharge Planner Nurse   Safe discharge environment identified: Yes  Barriers to discharge: No       Entered by: Jen Mckeon 11/20/2018 4:47 PM     A&Ox4. VSS. 1L of O2. Capnography monitoring. To try ambulating this evening. CMS intact. Incision sites WDL. Scant amt. of drainage to roberta pad. Tolerating clear liquids, BS hypoactive x4, denies passing flatus. Indwelling Moran catheter intact w/ adequate amt. Of output. Rates abdominal pain 8/10 - Pain managed w/ scheduled IV Toradol, prn po Oxycodone. Po Tylenol and IV Diluadid available as well. Can start Ibuprofen after last IV dose of Toradol. Will continue to monitor.     Please review provider order for any additional goals.   Nurse to notify provider when observation goals have been met and patient is ready for discharge.

## 2018-11-20 NOTE — ANESTHESIA CARE TRANSFER NOTE
Patient: Sha Vidal    Procedure(s):  total laparoscopic hysterectomy, bilateral salpingectomy    Diagnosis: abnormal uterine bleeding with enlarged uterine fibroid  Diagnosis Additional Information: No value filed.    Anesthesia Type:   General, ETT     Note:  Airway :Face Mask  Patient transferred to:PACU  Comments: VSS.  Spontaneously breathing O2 per open face mask.  Report given to RN.Handoff Report: Identifed the Patient, Identified the Reponsible Provider, Reviewed the pertinent medical history, Discussed the surgical course, Reviewed Intra-OP anesthesia mangement and issues during anesthesia, Set expectations for post-procedure period and Allowed opportunity for questions and acknowledgement of understanding      Vitals: (Last set prior to Anesthesia Care Transfer)    CRNA VITALS  11/20/2018 1121 - 11/20/2018 1158      11/20/2018             Pulse: 87    SpO2: (!)  89 %                Electronically Signed By: DULCE Mckinley CRNA  November 20, 2018  11:58 AM

## 2018-11-21 VITALS
RESPIRATION RATE: 16 BRPM | TEMPERATURE: 100.8 F | OXYGEN SATURATION: 90 % | BODY MASS INDEX: 32.57 KG/M2 | HEIGHT: 62 IN | SYSTOLIC BLOOD PRESSURE: 97 MMHG | DIASTOLIC BLOOD PRESSURE: 56 MMHG | WEIGHT: 177 LBS

## 2018-11-21 LAB
COPATH REPORT: NORMAL
CREAT SERPL-MCNC: 0.8 MG/DL (ref 0.52–1.04)
GFR SERPL CREATININE-BSD FRML MDRD: 76 ML/MIN/1.7M2
GLUCOSE SERPL-MCNC: 89 MG/DL (ref 70–99)
HGB BLD-MCNC: 8.6 G/DL (ref 11.7–15.7)

## 2018-11-21 PROCEDURE — 82565 ASSAY OF CREATININE: CPT | Performed by: OBSTETRICS & GYNECOLOGY

## 2018-11-21 PROCEDURE — 25000128 H RX IP 250 OP 636: Performed by: OBSTETRICS & GYNECOLOGY

## 2018-11-21 PROCEDURE — 25000132 ZZH RX MED GY IP 250 OP 250 PS 637: Performed by: OBSTETRICS & GYNECOLOGY

## 2018-11-21 PROCEDURE — 85018 HEMOGLOBIN: CPT | Performed by: OBSTETRICS & GYNECOLOGY

## 2018-11-21 PROCEDURE — 36415 COLL VENOUS BLD VENIPUNCTURE: CPT | Performed by: OBSTETRICS & GYNECOLOGY

## 2018-11-21 PROCEDURE — 82947 ASSAY GLUCOSE BLOOD QUANT: CPT | Performed by: OBSTETRICS & GYNECOLOGY

## 2018-11-21 RX ORDER — IBUPROFEN 600 MG/1
600 TABLET, FILM COATED ORAL EVERY 6 HOURS PRN
Status: DISCONTINUED | OUTPATIENT
Start: 2018-11-21 | End: 2018-11-21

## 2018-11-21 RX ORDER — CETIRIZINE HYDROCHLORIDE 10 MG/1
10 TABLET ORAL DAILY
Status: DISCONTINUED | OUTPATIENT
Start: 2018-11-21 | End: 2018-11-21 | Stop reason: HOSPADM

## 2018-11-21 RX ORDER — LEVOTHYROXINE SODIUM 25 UG/1
25 TABLET ORAL DAILY
Status: DISCONTINUED | OUTPATIENT
Start: 2018-11-21 | End: 2018-11-21 | Stop reason: HOSPADM

## 2018-11-21 RX ORDER — ACETAMINOPHEN 325 MG/1
650 TABLET ORAL EVERY 4 HOURS PRN
Status: DISCONTINUED | OUTPATIENT
Start: 2018-11-21 | End: 2018-11-21 | Stop reason: HOSPADM

## 2018-11-21 RX ORDER — NITROFURANTOIN 25; 75 MG/1; MG/1
100 CAPSULE ORAL 2 TIMES DAILY
Qty: 8 CAPSULE | Refills: 0 | Status: SHIPPED | OUTPATIENT
Start: 2018-11-21 | End: 2018-11-21

## 2018-11-21 RX ORDER — MULTIVITAMIN,THERAPEUTIC
1 TABLET ORAL DAILY
Status: DISCONTINUED | OUTPATIENT
Start: 2018-11-21 | End: 2018-11-21 | Stop reason: HOSPADM

## 2018-11-21 RX ORDER — NITROFURANTOIN 25; 75 MG/1; MG/1
100 CAPSULE ORAL 2 TIMES DAILY
Qty: 8 CAPSULE | Refills: 0 | Status: SHIPPED | OUTPATIENT
Start: 2018-11-21 | End: 2022-04-22

## 2018-11-21 RX ADMIN — SODIUM CHLORIDE 500 ML: 9 INJECTION, SOLUTION INTRAVENOUS at 11:02

## 2018-11-21 RX ADMIN — KETOROLAC TROMETHAMINE 30 MG: 30 INJECTION, SOLUTION INTRAMUSCULAR at 10:02

## 2018-11-21 RX ADMIN — OXYCODONE HYDROCHLORIDE 10 MG: 5 TABLET ORAL at 06:12

## 2018-11-21 RX ADMIN — KETOROLAC TROMETHAMINE 30 MG: 30 INJECTION, SOLUTION INTRAMUSCULAR at 04:18

## 2018-11-21 NOTE — PLAN OF CARE
Problem: Patient Care Overview  Goal: Plan of Care/Patient Progress Review  Outcome: No Change  Problem: Patient Care Overview  Goal: Plan of Care/Patient Progress Review  Outcome: No Change  PRIMARY DIAGNOSIS: POST-OPERATIVE: Total laparoscopic hysterectomy, bilateral salpingectomy  OUTPATIENT/OBSERVATION GOALS TO BE MET BEFORE DISCHARGE:  1. Stable vital signs Yes  2. Tolerating diet:Yes  3. Pain controlled with oral pain medications:  No  4. Positive bowel sounds:  Yes  5. Voiding without difficulty:  Moran in place  6. Able to ambulate:  No- only took few steps  7. Provider specific discharge goals met:  No     Discharge Planner Nurse   Safe discharge environment identified: Yes  Barriers to discharge: No       Entered by: Lea Hooks 11/21/2018 2:34 AM  Please review provider order for any additional goals.   Nurse to notify provider when observation goals have been met and patient is ready for discharge.     Patient is Alert and Oriented x4. Vitals are Temp: 99  F (37.2  C) Temp src: Oral BP: 92/52   Heart Rate: 63 Resp: 21 SpO2: 94 % O2 Device: None (Room air)  BP soft. Pt asymptomatic. Pt is denying pain at rest. Temp slightly high.  Scheduled toradol given.  They are 1 Assist with Activity.    Pt is on a Regular diet. Patient is Saline locked.  CMS intact. Pt denies nausea/ dizziness. Plan: pull moran in AM. Monitor pain. Ambulate in AM.

## 2018-11-21 NOTE — PLAN OF CARE
Problem: Patient Care Overview  Goal: Plan of Care/Patient Progress Review  Outcome: No Change  PRIMARY DIAGNOSIS: POST-OPERATIVE: Total laparoscopic hysterectomy, bilateral salpingectomy  OUTPATIENT/OBSERVATION GOALS TO BE MET BEFORE DISCHARGE:  1. Stable vital signs Yes  2. Tolerating diet:Yes  3. Pain controlled with oral pain medications:  No  4. Positive bowel sounds:  Yes  5. Voiding without difficulty:  Moran in place  6. Able to ambulate:  No- only took few steps  7. Provider specific discharge goals met:  No    Discharge Planner Nurse   Safe discharge environment identified: Yes  Barriers to discharge: No       Entered by: Lea Hooks 11/21/2018 2:34 AM     Please review provider order for any additional goals.   Nurse to notify provider when observation goals have been met and patient is ready for discharge.    Patient is Alert and Oriented x4. Vitals are Temp: 98.8  F (37.1  C) Temp src: Oral BP: 96/52   Heart Rate: 58 Resp: 22 SpO2: 95 % O2 Device: None (Room air) BP soft. Pt asymptomatic. Pt is denying pain at rest. Pt to get sched toradol.   They are 1 Assist with Activity.    Pt is on a Regular diet. Patient is Saline locked.  CMS intact. Pt denies nausea/ dizziness. Plan: pull moran in AM. Monitor pain. Ambulate in AM.

## 2018-11-21 NOTE — PROGRESS NOTES
I spoke with the pt this am.  She was unable to void after removal of the Saini catheter.  Will re-try voiding now  If unable to void I have asked the RN to straight cath pt and re-try voiding q 2 hrs while awake.  No lifting > 15 lbs for 6 wks  Take your tempature twice daily for 3 days and call if > 100.4  Nothing in vagina for 8 wks  Continue taking daily  MVI daily for 1 month  Neptali Fournier M.D.

## 2018-11-21 NOTE — PLAN OF CARE
Problem: Patient Care Overview  Goal: Plan of Care/Patient Progress Review  Outcome: Improving  PRIMARY DIAGNOSIS: POST-OPERATIVE: Total laparoscopic hysterectomy, bilateral salpingectomy  OUTPATIENT/OBSERVATION GOALS TO BE MET BEFORE DISCHARGE:  1. Stable vital signs: Yes  2. Tolerating diet: Advanced to regular diet for dinner, tolerated well.   3. Pain controlled with oral pain medications:  No, on scheduled IV Toradol, prn po Oxycodone  4. Positive bowel sounds:  Yes, hypoactive, denies passing flatus.  5. Voiding without difficulty:  No - Indwelling moran catheter patent w/ adequate amt. of output. Plan to remove tomorrow AM.   6. Able to ambulate:  No, tried ambulated this evening and patient felt very lightheaded/dizzy w/ increased pain. Took a few steps by bedside.   7. Provider specific discharge goals met:  No     Discharge Planner Nurse   Safe discharge environment identified: Yes  Barriers to discharge: No       Entered by: Jen Mckeon 11/20/2018 4:47 PM      A&Ox4. VSS. RA. Capnography monitoring. Sat on edge of bed this evening and took a few steps next to bedside. Unable to ambulate further d/t increase in pain and dizziness/lightheadedness. CMS intact. Incision sites WDL. Scant amt. of drainage on roberta pad. Tolerating regular diet, BS hypoactive x4, denies passing flatus. Indwelling Moran catheter intact w/ adequate amt. of output. Pain managed w/ scheduled IV Toradol, prn po Oxycodone. Po Tylenol and Ibuprofen, IV Diluadid available as well, pt. has not needed this tonight. Plan to remove indwelling moran catheter in the morning and attempt to ambulate again. Will continue to monitor.      Please review provider order for any additional goals.   Nurse to notify provider when observation goals have been met and patient is ready for discharge.

## 2018-11-21 NOTE — PROGRESS NOTES
Sha,  the pathology report showed an enlarged uterus with multiple fibroids without evidence of any cancers.  We can discuss this more when I see you back for your 2-week postop visit.  Please let me know if you have any additional questions  Neptali Fournier M.D.

## 2018-11-21 NOTE — PLAN OF CARE
Problem: Patient Care Overview  Goal: Plan of Care/Patient Progress Review  PRIMARY DIAGNOSIS: LAP HYST  OUTPATIENT/OBSERVATION GOALS TO BE MET BEFORE DISCHARGE:  1. Stable vital signs : Yes, soft BP   2. Tolerating diet:Yes  3. Pain controlled with oral pain medications: Yes  4. Positive bowel sounds:  Yes  5. Voiding without difficulty:  voided but retaining  6. Able to ambulate:  Yes, encouraged to ambulate in the halls  7. Provider specific discharge goals met:  No, need to ambulate and void, will recheck BP     Discharge Planner Nurse   Safe discharge environment identified: Yes  Barriers to discharge: No - will discontinue, possibly with moran if unable to void       Entered by: Joyce Tobias 11/21/2018 4:00PM  Please review provider order for any additional goals.   Nurse to notify provider when observation goals have been met and patient is ready for discharge.     Voided 50ml, PVR - 174ml, denies pain, tolerating regular diet, ambulated halls, will continue to monitor and provide supportive cares.Discharge later.

## 2018-11-21 NOTE — PROGRESS NOTES
GRAEME Lee Note  [facility]  Daily Post-Op Note         Assessment and Plan:    Assessment:   Post-operative day #1  Procedure(s):  total laparoscopic hysterectomy, bilateral salpingectomy     Doing well.  Normal healing wound.  No excessive bleeding  Pain well-controlled.      Plan:   -Ambulate  -Advance activity as tolerated  -Continue supportive and symptomatic treatment  -Pain control measures  -Advance diet as tolerated    Void prior to discharge  Discharge instructions reviewed with patient  Follow up 1-2 weeks with Dr. Wade Michel [facility], DO  OB/GYN         Interval History:   Doing well.  Continues to improve.  Pain is well-controlled.  No fevers.  Saini out, has not yet voided.            Review of Systems:    The patient denies any chest pain, shortness of breath, excessive pain, fever, chills, purulent drainage from the wound, nausea or vomiting.             Medications:   acetaminophen, HYDROmorphone, ibuprofen, lidocaine 4%, lidocaine (buffered or not buffered), naloxone, ondansetron **OR** ondansetron, oxyCODONE IR, sodium chloride (PF)          Physical Exam:     All vitals stable  Patient Vitals for the past 8 hrs:   BP Temp Temp src Heart Rate Resp SpO2   11/21/18 0843 (!) 87/51 97.5  F (36.4  C) Oral 68 24 95 %   11/21/18 0418 92/52 - - - - -   11/21/18 0417 (!) 87/46 99  F (37.2  C) Oral 63 21 94 %     Wounds clean and dry with minimal or no drainage.  Surrounding skin with minimal erythema.    Heart RRR  Lungs CTAB            Data:     Hemoglobin   Date Value Ref Range Status   11/21/2018 8.6 (L) 11.7 - 15.7 g/dL Final   10/23/2018 11.0 (L) 11.7 - 15.7 g/dL Final     Comment:     Results confirmed by repeat test   10/04/2018 11.3 (L) 11.7 - 15.7 g/dL Final   06/06/2018 11.8 11.7 - 15.7 g/dL Final     Comment:     Results confirmed by repeat test   05/01/2018 10.5 (L) 11.7 - 15.7 g/dL Final     Comment:     Results confirmed by repeat test

## 2018-11-21 NOTE — PLAN OF CARE
Problem: Patient Care Overview  Goal: Plan of Care/Patient Progress Review  Outcome: Improving  Problem: Patient Care Overview  Goal: Plan of Care/Patient Progress Review  PRIMARY DIAGNOSIS: LAP HYST  OUTPATIENT/OBSERVATION GOALS TO BE MET BEFORE DISCHARGE:  1. Stable vital signs BP low, bolus given x1  2. Tolerating diet:Yes  3. Pain controlled with oral pain medications: Yes  4. Positive bowel sounds:  Yes  5. Voiding without difficulty:  unable to void  6. Able to ambulate:  Yes  7. Provider specific discharge goals met:  Yes except unable to void     Discharge Planner Nurse   Safe discharge environment identified: Yes  Barriers to discharge: Yes       Entered by: Joyce Tobias 11/21/2018 12:00PM  Please review provider order for any additional goals.   Nurse to notify provider when observation goals have been met and patient is ready for discharge.

## 2018-11-21 NOTE — PLAN OF CARE
Problem: Patient Care Overview  Goal: Plan of Care/Patient Progress Review  PRIMARY DIAGNOSIS: LAP HYST  OUTPATIENT/OBSERVATION GOALS TO BE MET BEFORE DISCHARGE:  1. Stable vital signs BP low, will recheck  2. Tolerating diet:Yes  3. Pain controlled with oral pain medications: Yes  4. Positive bowel sounds:  Yes  5. Voiding without difficulty:  DTV  6. Able to ambulate:  Yes but dizzy  7. Provider specific discharge goals met:  No, need to ambulate and void, will recheck BP    Discharge Planner Nurse   Safe discharge environment identified: Yes  Barriers to discharge: Yes       Entered by: Joyce Tobias 11/21/2018 8:00AM     Please review provider order for any additional goals.   Nurse to notify provider when observation goals have been met and patient is ready for discharge.

## 2018-11-22 NOTE — PLAN OF CARE
Problem: Patient Care Overview  Goal: Discharge Needs Assessment  Patient's After Visit Summary was reviewed with patient and spouse.   Moran teaching explained. Pt aware she will f/u on Friday at 8:15AM to remove moran.   Patient verbalized understanding of After Visit Summary, recommended follow up and was given an opportunity to ask questions.   Discharge medications sent home with patient/family: oxycpdone, script for macrobid sent  Discharged with:  Andre    OBSERVATION patient END time: 1900

## 2018-11-22 NOTE — PROGRESS NOTES
Pt doing well other than inability to adequately empty her bladder.  Rn notes roberta edema  Pt has edema of upper lip without other sx to suggest allergic rxn or concerns  P: will discharge pt home with indwelling catheter and see her Fri am  Will discharge catheter at that time, have pt double voiud q 2 hrs while awake and recheck PVR at 1:30pm on Fri  Will discharge on Macrobid 100 mg po bid  Also give pt abdominal binder  Sx of concern discussed pt to call

## 2018-11-22 NOTE — DISCHARGE INSTRUCTIONS
Follow up with Dr. Fournier in the next 1-2 weeks.   Nothing in the vagina for 8 weeks.   Do not lift over 15 pounds.   Call the office for any questions or concerns.     Also follow up on Friday at the office to remove moran catheter at 8:15AM.

## 2018-11-23 ENCOUNTER — OFFICE VISIT (OUTPATIENT)
Dept: OBGYN | Facility: CLINIC | Age: 48
End: 2018-11-23
Payer: COMMERCIAL

## 2018-11-23 VITALS
BODY MASS INDEX: 33.86 KG/M2 | HEIGHT: 62 IN | SYSTOLIC BLOOD PRESSURE: 108 MMHG | WEIGHT: 184 LBS | DIASTOLIC BLOOD PRESSURE: 70 MMHG

## 2018-11-23 DIAGNOSIS — Z98.890 POSTOPERATIVE STATE: Primary | ICD-10-CM

## 2018-11-23 PROCEDURE — 99024 POSTOP FOLLOW-UP VISIT: CPT | Performed by: OBSTETRICS & GYNECOLOGY

## 2018-11-23 NOTE — NURSING NOTE
"Chief Complaint   Patient presents with     Catheterization (insertion/replacement)     Removal        Initial /70 (BP Location: Left arm, Patient Position: Sitting, Cuff Size: Adult Regular)  Ht 5' 2\" (1.575 m)  Wt 184 lb (83.5 kg)  LMP 10/31/2018 (Exact Date)  Breastfeeding? No  BMI 33.65 kg/m2 Estimated body mass index is 33.65 kg/(m^2) as calculated from the following:    Height as of this encounter: 5' 2\" (1.575 m).    Weight as of this encounter: 184 lb (83.5 kg).  BP completed using cuff size: regular    Questioned patient about current smoking habits.  Pt. has never smoked.          The following HM Due: NONE    Lennox Orantes CMA                "

## 2018-11-23 NOTE — MR AVS SNAPSHOT
After Visit Summary   11/23/2018    Sha Vidal    MRN: [id number]           Patient Information     Date Of Birth          1970        Visit Information        Provider Department      11/23/2018 8:45 AM Neptali Fournier MD [facility]        Today's Diagnoses     Postoperative state    -  1      Care Instructions    You can reach your [facility] Care Team any time of the day by calling [phone]. This number will put you in touch with the 24 hour nurse line if the clinic is closed.    To contact your OB/GYN Station Coordinator/Surgery Scheduler please call [phone]. This is a direct number for your care team between 8 a.m. and 4 p.m. Monday through Friday.    [facility] is open for your convenience:  Monday through Friday 8 a.m. to 6 p.m.  Closed weekends and all major holidays.            Follow-ups after your visit        Your next 10 appointments already scheduled     Nov 27, 2018  1:00 PM CST   SHORT with Neptali [facility]nier MD   [facility]    [address]  [address]  [facility] [id number] [phone]              Who to contact     If you have questions or need follow up information about today's clinic visit or your schedule please contact [facility] directly at [phone].  Normal or non-critical lab and imaging results will be communicated to you by MyChart, letter or phone within 4 business days after the clinic has received the results. If you do not hear from us within 7 days, please contact the clinic through MyChart or phone. If you have a critical or abnormal lab result, we will notify you by phone as soon as possible.  Submit refill requests through [employer] or call your pharmacy and they will forward the refill request to us. Please allow 3 business days for your refill to be completed.          Additional Information About Your Visit        [facility]  "Information     [employer] gives you secure access to your electronic health record. If you see a primary care provider, you can also send messages to your care team and make appointments. If you have questions, please call your primary care clinic.  If you do not have a primary care provider, please call [phone] and they will assist you.        Care EveryWhere ID     This is your Care EveryWhere ID. This could be used by other organizations to access your [facility] medical records  [id number]        Your Vitals Were     Height Last Period Breastfeeding? BMI (Body Mass Index)          5' 2\" (1.575 m) 10/31/2018 (Exact Date) No 33.65 kg/m2         Blood Pressure from Last 3 Encounters:   11/23/18 108/70   11/21/18 97/56   11/12/18 112/72    Weight from Last 3 Encounters:   11/23/18 184 lb (83.5 kg)   11/20/18 177 lb (80.3 kg)   11/12/18 179 lb (81.2 kg)              Today, you had the following     No orders found for display       Primary Care Provider Office Phone # Fax #    Anita Quiros PA-C [phone]       [address] [facility] [address]        Equal Access to Services     ROBSON CONLEY AH: Hadii aad ku hadasho Soomaali, waaxda luqadaha, qaybta kaalmada adeegyada, waxay idiin hayorionn adeeg ryley lastephen ah. So [facility] [phone].    ATENCIÓN: Si habla español, tiene a collins disposición servicios gratuitos de asistencia lingüística. Llame al [phone].    We comply with applicable federal civil rights laws and [place] laws. We do not discriminate on the basis of race, color, national origin, age, disability, sex, sexual orientation, or gender identity.            Thank you!     Thank you for choosing [facility]  for your care. Our goal is always to provide you with excellent care. Hearing back from our patients is one way we can continue to improve our services. Please take a few minutes to complete the written survey that you may receive in the mail " after your visit with us. Thank you!             Your Updated Medication List - Protect others around you: Learn how to safely use, store and throw away your medicines at www.disposemymeds.org.          This list is accurate as of 11/23/18  9:00 AM.  Always use your most recent med list.                   Brand Name Dispense Instructions for use Diagnosis    * acetaminophen 325 MG tablet    TYLENOL    100 tablet    Take 2 tablets (650 mg) by mouth every 4 hours as needed for other (mild pain)    Post-operative state       * acetaminophen 325 MG tablet    TYLENOL    100 tablet    Take 2 tablets (650 mg) by mouth every 4 hours as needed for other (mild pain)    Post-operative state       cetirizine 10 MG tablet    zyrTEC     Take 10 mg by mouth daily        D 2000 2000 units tablet   Generic drug:  cholecalciferol      Take 2,000 Units by mouth daily        * ibuprofen 600 MG tablet    ADVIL/MOTRIN    30 tablet    Take 1 tablet (600 mg) by mouth every 6 hours as needed for pain (mild)    Post-operative state       * ibuprofen 600 MG tablet    ADVIL/MOTRIN    30 tablet    Take 1 tablet (600 mg) by mouth every 6 hours as needed for pain (mild)    Post-operative state       levothyroxine 25 MCG tablet    SYNTHROID/LEVOTHROID    90 tablet    TAKE 1 TABLET BY MOUTH  DAILY    Hypothyroidism, unspecified type       melatonin 3 MG tablet     30 tablet    Take 1.5 tablets (4.5 mg) by mouth nightly as needed for sleep    Insomnia, unspecified type       MULTI-VITAMINS Tabs      Take 1 tablet by mouth daily        nitroFURantoin (macrocrystal-monohydrate) 100 MG capsule    MACROBID    8 capsule    Take 1 capsule (100 mg) by mouth 2 times daily    Post-operative state       oxyCODONE 5 MG tablet    ROXICODONE    16 tablet    Take 1-2 tablets (5-10 mg) by mouth every 3 hours as needed for pain (Moderate to Severe)    Post-operative state       sertraline 50 MG tablet    ZOLOFT    90 tablet    Take 1 tablet (50 mg) by mouth daily     Adjustment disorder with mixed anxiety and depressed mood       * Notice:  This list has 4 medication(s) that are the same as other medications prescribed for you. Read the directions carefully, and ask your doctor or other care provider to review them with you.

## 2018-11-23 NOTE — PROGRESS NOTES
"The patient is a 48-year-old white female, para 3-0-4-3, tubal sterilization for contraception, who I was asked to see by JM Chapman for evaluation of menorrhagia and associated anemia that has been getting progressively worse.  Extensive evaluation has been undertaken.  In 04/2008 she was noted to be profoundly anemic with a hemoglobin of 8.5.  Ultrasound revealed an enlarged fibroid uterus.  Endometrial biopsy was also benign.  I reviewed the findings with the patient at length on multiple occasions and discussed with her the risks, benefits and alternative forms of therapy.  After these lengthy discussions, the patient has made a decision to proceed with a total laparoscopic hysterectomy, bilateral salpingectomy and if the ovaries appear to be normal, ovarian preservation.  This was performed on 11/20/2018.  The pathology report showed the following    FINAL DIAGNOSIS:   Uterus, cervix, and bilateral fallopian tubes, hysterectomy and bilateral   salpingectomies.   - Cervix without evidence of dysplasia.   - Secretory endometrium without evidence of hyperplasia.   - Uterine leiomyoma.   - Benign fallopian tubes.   - Negative for malignancy.   Postoperatively the patient had difficulty with complete emptying of her bladder and was elected to send her home with a Saini catheter.  Postoperatively the patient is done well and presents today to have her Saini catheter removed.    /70 (BP Location: Left arm, Patient Position: Sitting, Cuff Size: Adult Regular)  Ht 5' 2\" (1.575 m)  Wt 184 lb (83.5 kg)  LMP 10/31/2018 (Exact Date)  Breastfeeding? No  BMI 33.65 kg/m2  Constitutional: healthy, alert and no distress the Saini catheter was removed  .  There is been clear urine in the bag    (Z98.890) Postoperative state  (primary encounter diagnosis)  Comment: Inability to completely empty her bladder postop status post catheter placement presents now for follow-up catheter was removed this morning  Plan: " I would have the patient double void every 2 hours while awake.  If she has any concerns she will give a call.  I will see her back at 1130 this morning to recheck a postvoid residual volume and if it is within acceptable limits we will send her back for routine postop follow-up.  Patient and her  understand the plan and are comfortable with this

## 2018-11-23 NOTE — PATIENT INSTRUCTIONS
You can reach your [facility] Care Team any time of the day by calling [phone]. This number will put you in touch with the 24 hour nurse line if the clinic is closed.    To contact your OB/GYN Station Coordinator/Surgery Scheduler please call [phone]. This is a direct number for your care team between 8 a.m. and 4 p.m. Monday through Friday.    [facility] is open for your convenience:  Monday through Friday 8 a.m. to 6 p.m.  Closed weekends and all major holidays.

## 2018-12-10 ENCOUNTER — OFFICE VISIT (OUTPATIENT)
Dept: OBGYN | Facility: CLINIC | Age: 48
End: 2018-12-10
Payer: COMMERCIAL

## 2018-12-10 VITALS
BODY MASS INDEX: 32.24 KG/M2 | TEMPERATURE: 97.5 F | HEIGHT: 62 IN | WEIGHT: 175.2 LBS | SYSTOLIC BLOOD PRESSURE: 112 MMHG | DIASTOLIC BLOOD PRESSURE: 76 MMHG

## 2018-12-10 DIAGNOSIS — Z98.890 POSTOPERATIVE STATE: Primary | ICD-10-CM

## 2018-12-10 PROCEDURE — 99024 POSTOP FOLLOW-UP VISIT: CPT | Performed by: OBSTETRICS & GYNECOLOGY

## 2018-12-10 ASSESSMENT — MIFFLIN-ST. JEOR: SCORE: 1377.95

## 2018-12-10 NOTE — PATIENT INSTRUCTIONS
You can reach your [facility] Care Team any time of the day by calling [phone]. This number will put you in touch with the 24 hour nurse line if the clinic is closed.    To contact your OB/GYN Station Coordinator/Surgery Scheduler please call [phone]. This is a direct number for your care team between 8 a.m. and 4 p.m. Monday through Friday.    [facility] is open for your convenience:  Monday through Friday 8 a.m. to 6 p.m.  Closed weekends and all major holidays.

## 2018-12-10 NOTE — NURSING NOTE
"Chief Complaint   Patient presents with     Surgical Followup     hysterectomy 18--would like more time off--needs a letter for work--ribs on back are sore       Initial /76 (BP Location: Left arm, Patient Position: Chair, Cuff Size: Adult Regular)   Temp 97.5  F (36.4  C) (Oral)   Ht 1.575 m (5' 2\")   Wt 79.5 kg (175 lb 3.2 oz)   BMI 32.04 kg/m   Estimated body mass index is 32.04 kg/m  as calculated from the following:    Height as of this encounter: 1.575 m (5' 2\").    Weight as of this encounter: 79.5 kg (175 lb 3.2 oz).  BP completed using cuff size: regular    Questioned patient about current smoking habits.  Pt. has never smoked.          The following HM Due: NONE    "

## 2018-12-10 NOTE — PROGRESS NOTES
The patient is a 48-year-old white female, para 3-0-4-3, tubal sterilization for contraception, who I was asked to see by JM Chapman for evaluation of menorrhagia and associated anemia that has been getting progressively worse.  Extensive evaluation has been undertaken.  In 04/2008 she was noted to be profoundly anemic with a hemoglobin of 8.5.  Ultrasound revealed an enlarged fibroid uterus.  Endometrial biopsy was also benign.  I reviewed the findings with the patient at length on multiple occasions and discussed with her the risks, benefits and alternative forms of therapy.  After these lengthy discussions, the patient has made a decision to proceed with a total laparoscopic hysterectomy, bilateral salpingectomy and if the ovaries appear to be normal, ovarian preservation.  This was performed on 11/20/2018.  The pathology report showed the following     FINAL DIAGNOSIS:   Uterus, cervix, and bilateral fallopian tubes, hysterectomy and bilateral   salpingectomies.   - Cervix without evidence of dysplasia.   - Secretory endometrium without evidence of hyperplasia.   - Uterine leiomyoma.   - Benign fallopian tubes.   - Negative for malignancy.   Postoperatively the patient had difficulty with complete emptying of her bladder and was elected to send her home with a Saini catheter.  It was discontinued at her last follow-up visit she is continued to do well since that time Postoperatively the patient is done well and presents today for follow-up.  She has minimal vaginal discharge  Past Medical History:   Diagnosis Date     Allergic rhinitis, cause unspecified      Bleeding disorder (H)     factor 12     CARDIOVASCULAR SCREENING; LDL GOAL LESS THAN 160 10/31/2010     Hashimoto's thyroiditis      Lateral epicondylitis 3/13/2009     Migraine headache 2/19/2013     NONSPECIFIC MEDICAL HISTORY 2002    Factor XII deficiency (asymptomatic)     NONSPECIFIC MEDICAL HISTORY 2002    GRAYSON positive 1:320, seen by Rheum  "(negative PHUONG eval)      ROS: 10 point ROS neg other than the symptoms noted above in the HPI.  /76 (BP Location: Left arm, Patient Position: Chair, Cuff Size: Adult Regular)   Temp 97.5  F (36.4  C) (Oral)   Ht 1.575 m (5' 2\")   Wt 79.5 kg (175 lb 3.2 oz)   BMI 32.04 kg/m    Constitutional: healthy, alert and no distress  Cardiovascular: negative, PMI normal. No lifts, heaves, or thrills. RRR. No murmurs, clicks gallops or rub  Respiratory: negative, Percussion normal. Good diaphragmatic excursion. Lungs clear  Gastrointestinal: Abdomen soft, non-tender. BS normal. No masses, organomegaly incisions clean and intact and healing well  Genitourinary: Normal external genitalia without lesions and speculum excellent apical compartment support cuff is healing nicely bimanual exam absent uterus and cervix adnexa without masses enlargement or tenderness    (Z98.890) Postoperative state  (primary encounter diagnosis)  Comment: Doing well  Plan: We will see her back in 1 month.  She will continue with pelvic rest and postop restrictions.  She will call for any concerns in the interval        "

## 2019-01-31 ENCOUNTER — OFFICE VISIT (OUTPATIENT)
Dept: OBGYN | Facility: CLINIC | Age: 49
End: 2019-01-31
Payer: COMMERCIAL

## 2019-01-31 VITALS — WEIGHT: 178 LBS | BODY MASS INDEX: 32.56 KG/M2 | SYSTOLIC BLOOD PRESSURE: 102 MMHG | DIASTOLIC BLOOD PRESSURE: 66 MMHG

## 2019-01-31 DIAGNOSIS — N93.9 ABNORMAL UTERINE BLEEDING: Primary | ICD-10-CM

## 2019-01-31 PROCEDURE — 99024 POSTOP FOLLOW-UP VISIT: CPT | Performed by: OBSTETRICS & GYNECOLOGY

## 2019-01-31 NOTE — NURSING NOTE
"Chief Complaint   Patient presents with     Surgical Followup       Initial /66   Wt 80.7 kg (178 lb)   LMP 10/31/2018 (Exact Date)   BMI 32.56 kg/m   Estimated body mass index is 32.56 kg/m  as calculated from the following:    Height as of 12/10/18: 1.575 m (5' 2\").    Weight as of this encounter: 80.7 kg (178 lb).  BP completed using cuff size: regular    Questioned patient about current smoking habits.  Pt. has never smoked.          The following HM Due:       The following patient reported/Care Every where data was sent to:  P ABSTRACT QUALITY INITIATIVES [[id number]]        Isa Peña [facility]                 "

## 2019-02-02 PROBLEM — N93.9 ABNORMAL UTERINE BLEEDING: Status: RESOLVED | Noted: 2017-09-14 | Resolved: 2019-02-02

## 2019-02-02 PROBLEM — D64.9 LOW HEMOGLOBIN: Status: RESOLVED | Noted: 2018-04-17 | Resolved: 2019-02-02

## 2019-02-02 PROBLEM — D69.9 BLEEDING DISORDER (H): Status: RESOLVED | Noted: 2018-10-23 | Resolved: 2019-02-02

## 2019-02-02 NOTE — PROGRESS NOTES
The patient is a 49-year-old white female, para 3-0-4-3, tubal sterilization for contraception, who I was asked to see by JM Chapman for evaluation of menorrhagia and associated anemia that has been getting progressively worse.  Extensive evaluation has been undertaken.  In 04/2008 she was noted to be profoundly anemic with a hemoglobin of 8.5.  Ultrasound revealed an enlarged fibroid uterus.  Endometrial biopsy was also benign.  I reviewed the findings with the patient at length on multiple occasions and discussed with her the risks, benefits and alternative forms of therapy.  After these lengthy discussions, the patient has made a decision to proceed with a total laparoscopic hysterectomy, bilateral salpingectomy and if the ovaries appear to be normal, ovarian preservation.  This was performed on 11/20/2018.  The pathology report showed the following     FINAL DIAGNOSIS:   Uterus, cervix, and bilateral fallopian tubes, hysterectomy and bilateral   salpingectomies.   - Cervix without evidence of dysplasia.   - Secretory endometrium without evidence of hyperplasia.   - Uterine leiomyoma.   - Benign fallopian tubes.   - Negative for malignancy.   Postoperatively the patient had difficulty with complete emptying of her bladder which resolved with conservative management.  She presents today for a final postop visit.  She is doing well without concerns  Past Medical History:   Diagnosis Date     Allergic rhinitis, cause unspecified      Bleeding disorder (H)     factor 12     CARDIOVASCULAR SCREENING; LDL GOAL LESS THAN 160 10/31/2010     Hashimoto's thyroiditis      Lateral epicondylitis 3/13/2009     Migraine headache 2/19/2013     NONSPECIFIC MEDICAL HISTORY 2002    Factor XII deficiency (asymptomatic)     NONSPECIFIC MEDICAL HISTORY 2002    GRAYSON positive 1:320, seen by Rheum (negative PHUONG eval)     Past Surgical History:   Procedure Laterality Date     ABDOMEN SURGERY      10/2007     APPENDECTOMY       10/2007     C NONSPECIFIC PROCEDURE      wisdom teeth extraction     C NONSPECIFIC PROCEDURE      Bilateral tubal ligation     C NONSPECIFIC PROCEDURE  1/01    Lasik     C NONSPECIFIC PROCEDURE      explo lap for ARMANDO for SBO     DAVINCI HERNIORRHAPHY VENTRAL N/A 8/15/2017    Procedure: DAVINCI HERNIORRHAPHY VENTRAL;  Robotic assisted incisional and supra-umbilical  hernia repair with mesh;  Surgeon: Damon Hall MD;  Location: [facility] OR     DILATION AND CURETTAGE, HYSTEROSCOPY DIAGNOSTIC, COMBINED N/A 11/3/2017    Procedure: COMBINED DILATION AND CURETTAGE, HYSTEROSCOPY DIAGNOSTIC;  Exam Under Anesthesia, Diagnosistic Hysteroscopy, dilation and curettage, diagnostic laparoscopy and lysis of adhesions;  Surgeon: Neptali Fournier MD;  Location: [facility] OR     LAPAROSCOPIC HYSTERECTOMY TOTAL, SALPINGECTOMY BILATERAL Bilateral 11/20/2018    Procedure: total laparoscopic hysterectomy, bilateral salpingectomy;  Surgeon: Neptali Fournier MD;  Location: [facility] OR     LAPAROSCOPY DIAGNOSTIC (GYN) N/A 11/3/2017    Procedure: LAPAROSCOPY DIAGNOSTIC (GYN);  diagnostic laparoscopy, lysis of adhesions;  Surgeon: Neptali Fournier MD;  Location: [facility] OR      ROS: 10 point ROS neg other than the symptoms noted above in the HPI.  /66   Wt 80.7 kg (178 lb)   LMP 10/31/2018 (Exact Date)   BMI 32.56 kg/m    Constitutional: healthy, alert and no distress  Cardiovascular: negative, PMI normal. No lifts, heaves, or thrills. RRR. No murmurs, clicks gallops or rub  Respiratory: negative, Percussion normal. Good diaphragmatic excursion. Lungs clear  Gastrointestinal: Abdomen soft, non-tender. BS normal. No masses, organomegaly incisions are well-healed  Genitourinary: Normal external genitalia without lesions and speculum excellent apical compartment support.  Cuff is well-healed.  Minuscule stitch fragment to the right of midline, bimanual exam the uterus is absent adnexa without masses enlargement or tenderness, stitch fragment is not  palpable.  Rectovaginal exam excellent support no masses    (N93.9) Abnormal uterine bleeding  (primary encounter diagnosis)  Comment: Doing well post hysterectomy  Plan: Return to normal lifestyle with annual follow-up or as needed concerns arise

## 2019-02-02 NOTE — PATIENT INSTRUCTIONS
You can reach your [facility] Care Team any time of the day by calling [phone]. This number will put you in touch with the 24 hour nurse line if the clinic is closed.    To contact your OB/GYN Surgery Scheduler please call [phone]. This is a direct number for your care team between 8 a.m. and 4 p.m. Monday through Friday.    [facility] is open for your convenience: [phone]  Monday through Friday 8 a.m. to 8:30 p.m.  Saturday 9 a.m. to 6 p.m.  Sunday Noon to 6 p.m.    They are closed on all major holidays.

## 2019-02-24 DIAGNOSIS — E03.9 HYPOTHYROIDISM, UNSPECIFIED TYPE: ICD-10-CM

## 2019-02-25 RX ORDER — LEVOTHYROXINE SODIUM 25 UG/1
TABLET ORAL
Qty: 90 TABLET | Refills: 0 | Status: SHIPPED | OUTPATIENT
Start: 2019-02-25 | End: 2022-04-22

## 2019-02-25 NOTE — TELEPHONE ENCOUNTER
"Requested Prescriptions   Pending Prescriptions Disp Refills     levothyroxine (SYNTHROID/LEVOTHROID) 25 MCG tablet [Pharmacy Med Name: LEVOTHYROXINE  0.025MG  TAB]  Last Written Prescription Date:  6/26/18  Last Fill Quantity: 90,  # refills: 2   Last office visit: 10/23/2018 with prescribing provider:  Anita Quiros PA-C   Future Office Visit:     90 tablet 2     Sig: TAKE 1 TABLET BY MOUTH  DAILY    Thyroid Protocol Passed - 2/24/2019  8:23 PM       Passed - Patient is 12 years or older       Passed - Recent (12 mo) or future (30 days) visit within the authorizing provider's specialty    Patient had office visit in the last 12 months or has a visit in the next 30 days with authorizing provider or within the authorizing provider's specialty.  See \"Patient Info\" tab in inbasket, or \"Choose Columns\" in Meds & Orders section of the refill encounter.             Passed - Medication is active on med list       Passed - Normal TSH on file in past 12 months    Recent Labs   Lab Test 04/17/18  0755   TSH 3.06             Passed - No active pregnancy on record    If patient is pregnant or has had a positive pregnancy test, please check TSH.         Passed - No positive pregnancy test in past 12 months    If patient is pregnant or has had a positive pregnancy test, please check TSH.            "

## 2019-02-25 NOTE — TELEPHONE ENCOUNTER
Prescription approved per [facility], [facility] or [facility] refill protocol.  Sheryl WILKINS RN - Triage  [facility]

## 2019-05-20 ENCOUNTER — MYC MEDICAL ADVICE (OUTPATIENT)
Dept: FAMILY MEDICINE | Facility: CLINIC | Age: 49
End: 2019-05-20

## 2019-09-05 ENCOUNTER — TRANSFERRED RECORDS (OUTPATIENT)
Dept: HEALTH INFORMATION MANAGEMENT | Facility: CLINIC | Age: 49
End: 2019-09-05

## 2019-09-27 ENCOUNTER — HEALTH MAINTENANCE LETTER (OUTPATIENT)
Age: 49
End: 2019-09-27

## 2021-01-09 ENCOUNTER — HEALTH MAINTENANCE LETTER (OUTPATIENT)
Age: 51
End: 2021-01-09

## 2021-04-15 ENCOUNTER — IMMUNIZATION (OUTPATIENT)
Dept: PEDIATRICS | Facility: CLINIC | Age: 51
End: 2021-04-15
Payer: COMMERCIAL

## 2021-04-15 PROCEDURE — 91300 PR COVID VAC PFIZER DIL RECON 30 MCG/0.3 ML IM: CPT

## 2021-04-15 PROCEDURE — 0001A PR COVID VAC PFIZER DIL RECON 30 MCG/0.3 ML IM: CPT

## 2021-05-06 ENCOUNTER — IMMUNIZATION (OUTPATIENT)
Dept: PEDIATRICS | Facility: CLINIC | Age: 51
End: 2021-05-06
Attending: INTERNAL MEDICINE
Payer: COMMERCIAL

## 2021-05-06 PROCEDURE — 91300 PR COVID VAC PFIZER DIL RECON 30 MCG/0.3 ML IM: CPT

## 2021-05-06 PROCEDURE — 0002A PR COVID VAC PFIZER DIL RECON 30 MCG/0.3 ML IM: CPT

## 2021-10-23 ENCOUNTER — HEALTH MAINTENANCE LETTER (OUTPATIENT)
Age: 51
End: 2021-10-23

## 2022-01-18 ENCOUNTER — LAB (OUTPATIENT)
Dept: URGENT CARE | Facility: URGENT CARE | Age: 52
End: 2022-01-18
Payer: COMMERCIAL

## 2022-01-18 DIAGNOSIS — Z20.822 SUSPECTED COVID-19 VIRUS INFECTION: ICD-10-CM

## 2022-01-18 LAB — SARS-COV-2 RNA RESP QL NAA+PROBE: POSITIVE

## 2022-01-18 PROCEDURE — U0003 INFECTIOUS AGENT DETECTION BY NUCLEIC ACID (DNA OR RNA); SEVERE ACUTE RESPIRATORY SYNDROME CORONAVIRUS 2 (SARS-COV-2) (CORONAVIRUS DISEASE [COVID-19]), AMPLIFIED PROBE TECHNIQUE, MAKING USE OF HIGH THROUGHPUT TECHNOLOGIES AS DESCRIBED BY CMS-2020-01-R: HCPCS

## 2022-01-18 PROCEDURE — U0005 INFEC AGEN DETEC AMPLI PROBE: HCPCS

## 2022-02-12 ENCOUNTER — HEALTH MAINTENANCE LETTER (OUTPATIENT)
Age: 52
End: 2022-02-12

## 2022-04-22 ENCOUNTER — OFFICE VISIT (OUTPATIENT)
Dept: FAMILY MEDICINE | Facility: CLINIC | Age: 52
End: 2022-04-22
Payer: COMMERCIAL

## 2022-04-22 VITALS
WEIGHT: 191 LBS | TEMPERATURE: 97.4 F | HEART RATE: 74 BPM | SYSTOLIC BLOOD PRESSURE: 114 MMHG | DIASTOLIC BLOOD PRESSURE: 80 MMHG | OXYGEN SATURATION: 98 % | HEIGHT: 62 IN | BODY MASS INDEX: 35.15 KG/M2

## 2022-04-22 DIAGNOSIS — S39.012A STRAIN OF LUMBAR REGION, INITIAL ENCOUNTER: ICD-10-CM

## 2022-04-22 DIAGNOSIS — Z11.59 NEED FOR HEPATITIS C SCREENING TEST: ICD-10-CM

## 2022-04-22 DIAGNOSIS — R53.83 FATIGUE, UNSPECIFIED TYPE: ICD-10-CM

## 2022-04-22 DIAGNOSIS — Z12.31 VISIT FOR SCREENING MAMMOGRAM: ICD-10-CM

## 2022-04-22 DIAGNOSIS — Z12.11 SCREEN FOR COLON CANCER: ICD-10-CM

## 2022-04-22 DIAGNOSIS — Z00.00 ROUTINE GENERAL MEDICAL EXAMINATION AT A HEALTH CARE FACILITY: Primary | ICD-10-CM

## 2022-04-22 PROBLEM — D25.9 UTERINE LEIOMYOMA, UNSPECIFIED LOCATION: Status: RESOLVED | Noted: 2017-07-17 | Resolved: 2022-04-22

## 2022-04-22 LAB
ERYTHROCYTE [DISTWIDTH] IN BLOOD BY AUTOMATED COUNT: 14.5 % (ref 10–15)
HBA1C MFR BLD: 5.3 % (ref 0–5.6)
HCT VFR BLD AUTO: 44.1 % (ref 35–47)
HGB BLD-MCNC: 14.2 G/DL (ref 11.7–15.7)
HOLD SPECIMEN: NORMAL
MCH RBC QN AUTO: 28.5 PG (ref 26.5–33)
MCHC RBC AUTO-ENTMCNC: 32.2 G/DL (ref 31.5–36.5)
MCV RBC AUTO: 88 FL (ref 78–100)
PLATELET # BLD AUTO: 312 10E3/UL (ref 150–450)
RBC # BLD AUTO: 4.99 10E6/UL (ref 3.8–5.2)
WBC # BLD AUTO: 7.2 10E3/UL (ref 4–11)

## 2022-04-22 PROCEDURE — 36415 COLL VENOUS BLD VENIPUNCTURE: CPT | Performed by: FAMILY MEDICINE

## 2022-04-22 PROCEDURE — 86803 HEPATITIS C AB TEST: CPT | Performed by: FAMILY MEDICINE

## 2022-04-22 PROCEDURE — 82306 VITAMIN D 25 HYDROXY: CPT | Performed by: FAMILY MEDICINE

## 2022-04-22 PROCEDURE — 99214 OFFICE O/P EST MOD 30 MIN: CPT | Mod: 25 | Performed by: FAMILY MEDICINE

## 2022-04-22 PROCEDURE — 99386 PREV VISIT NEW AGE 40-64: CPT | Performed by: FAMILY MEDICINE

## 2022-04-22 PROCEDURE — 80061 LIPID PANEL: CPT | Performed by: FAMILY MEDICINE

## 2022-04-22 PROCEDURE — 83036 HEMOGLOBIN GLYCOSYLATED A1C: CPT | Performed by: FAMILY MEDICINE

## 2022-04-22 PROCEDURE — 84443 ASSAY THYROID STIM HORMONE: CPT | Performed by: FAMILY MEDICINE

## 2022-04-22 PROCEDURE — 85027 COMPLETE CBC AUTOMATED: CPT | Performed by: FAMILY MEDICINE

## 2022-04-22 PROCEDURE — 80053 COMPREHEN METABOLIC PANEL: CPT | Performed by: FAMILY MEDICINE

## 2022-04-22 RX ORDER — CYCLOBENZAPRINE HCL 5 MG
5 TABLET ORAL 3 TIMES DAILY PRN
Qty: 42 TABLET | Refills: 0 | Status: SHIPPED | OUTPATIENT
Start: 2022-04-22 | End: 2022-05-11

## 2022-04-22 RX ORDER — CELECOXIB 100 MG/1
100 CAPSULE ORAL
COMMUNITY
Start: 2021-01-29 | End: 2022-04-22

## 2022-04-22 ASSESSMENT — ANXIETY QUESTIONNAIRES
GAD7 TOTAL SCORE: 2
7. FEELING AFRAID AS IF SOMETHING AWFUL MIGHT HAPPEN: NOT AT ALL
2. NOT BEING ABLE TO STOP OR CONTROL WORRYING: NOT AT ALL
1. FEELING NERVOUS, ANXIOUS, OR ON EDGE: NOT AT ALL
7. FEELING AFRAID AS IF SOMETHING AWFUL MIGHT HAPPEN: NOT AT ALL
4. TROUBLE RELAXING: SEVERAL DAYS
3. WORRYING TOO MUCH ABOUT DIFFERENT THINGS: NOT AT ALL
6. BECOMING EASILY ANNOYED OR IRRITABLE: SEVERAL DAYS
5. BEING SO RESTLESS THAT IT IS HARD TO SIT STILL: NOT AT ALL
GAD7 TOTAL SCORE: 2

## 2022-04-22 ASSESSMENT — ENCOUNTER SYMPTOMS
NERVOUS/ANXIOUS: 0
NAUSEA: 0
PARESTHESIAS: 0
DIARRHEA: 0
ABDOMINAL PAIN: 0
DIZZINESS: 0
DYSURIA: 0
FREQUENCY: 0
JOINT SWELLING: 0
ARTHRALGIAS: 1
HEADACHES: 0
FEVER: 0
HEMATOCHEZIA: 0
COUGH: 0
EYE PAIN: 0
CONSTIPATION: 0
HEARTBURN: 0
HEMATURIA: 0
PALPITATIONS: 0
SHORTNESS OF BREATH: 0
CHILLS: 0
SORE THROAT: 0
WEAKNESS: 0
MYALGIAS: 1

## 2022-04-22 NOTE — PATIENT INSTRUCTIONS
http://[email or web address].org/physical-activity/[email or web address]     Preventive Health Recommendations  Female Ages 50 - 64    Yearly exam: See your health care provider every year in order to  Review health changes.   Discuss preventive care.    Review your medicines if your doctor has prescribed any.    Get a Pap test every three years (unless you have an abnormal result and your provider advises testing more often).  If you get Pap tests with HPV test, you only need to test every 5 years, unless you have an abnormal result.   You do not need a Pap test if your uterus was removed (hysterectomy) and you have not had cancer.  You should be tested each year for STDs (sexually transmitted diseases) if you're at risk.   Have a mammogram every 1 to 2 years.  Have a colonoscopy at age 50, or have a yearly FIT test (stool test). These exams screen for colon cancer.    Have a cholesterol test every 5 years, or more often if advised.  Have a diabetes test (fasting glucose) every three years. If you are at risk for diabetes, you should have this test more often.   If you are at risk for osteoporosis (brittle bone disease), think about having a bone density scan (DEXA).    Shots: Get a flu shot each year. Get a tetanus shot every 10 years.    Nutrition:   Eat at least 5 servings of fruits and vegetables each day.  Eat whole-grain bread, whole-wheat pasta and brown rice instead of white grains and rice.  Get adequate Calcium and Vitamin D.     Lifestyle  Exercise at least 150 minutes a week (30 minutes a day, 5 days a week). This will help you control your weight and prevent disease.  Limit alcohol to one drink per day.  No smoking.   Wear sunscreen to prevent skin cancer.   See your dentist every six months for an exam and cleaning.  See your eye doctor every 1 to 2 years.

## 2022-04-22 NOTE — PROGRESS NOTES
SUBJECTIVE:   CC: hSa Vidal is an 52 year old woman who presents for preventive health visit.     {    Healthy Habits:     Getting at least 3 servings of Calcium per day:  Yes    Bi-annual eye exam:  NO    Dental care twice a year:  NO    Sleep apnea or symptoms of sleep apnea:  Excessive snoring    Diet:  Regular (no restrictions)    Frequency of exercise:  2-3 days/week    Duration of exercise:  Less than 15 minutes    Taking medications regularly:  Yes    Medication side effects:  None    PHQ-2 Total Score: 0    Additional concerns today:  Yes    Has been off thyroid medicine for a few years. Lately has been feeling more tired.   She also had COVID in January and unsure if this could be related to that.     Hurt her back on Monday- unsure what the cause of injury was. Has been having a hard time with reaching.   Denies any numbness or tingling.     HX of hysterectomy- pap no longer indicated     Today's PHQ-2 Score:   PHQ-2 ( 1999 Pfizer) 4/22/2022   Q1: Little interest or pleasure in doing things 0   Q2: Feeling down, depressed or hopeless 0   PHQ-2 Score 0   Q1: Little interest or pleasure in doing things Not at all   Q2: Feeling down, depressed or hopeless Not at all   PHQ-2 Score 0       Abuse: Current or Past (Physical, Sexual or Emotional) - No  Do you feel safe in your environment? Yes        Social History     Tobacco Use     Smoking status: Never Smoker     Smokeless tobacco: Never Used   Substance Use Topics     Alcohol use: Yes     Alcohol/week: 0.0 standard drinks     Comment: occasionally     If you drink alcohol do you typically have >3 drinks per day or >7 drinks per week? No    Alcohol Use 4/22/2022   Prescreen: >3 drinks/day or >7 drinks/week? No   Prescreen: >3 drinks/day or >7 drinks/week? -   No flowsheet data found.    Reviewed orders with patient.  Reviewed health maintenance and updated orders accordingly - Yes  Labs reviewed in EPIC    Breast Cancer Screening:    FHS-7: No  "flowsheet data found.  click delete button to remove this line now  Mammogram Screening: Recommended annual mammography  Pertinent mammograms are reviewed under the imaging tab.    History of abnormal Pap smear: Status post benign hysterectomy. Health Maintenance and Surgical History updated.  PAP / HPV Latest Ref Rng & Units 7/24/2015 2/2/2012   PAP (Historical) - NIL NIL   HPV16 NEG Negative -   HPV18 NEG Negative -   HRHPV NEG Negative -     Reviewed and updated as needed this visit by clinical staff   Tobacco  Allergies                 Reviewed and updated as needed this visit by Provider                       Review of Systems   Constitutional: Negative for chills and fever.   HENT: Negative for congestion, ear pain, hearing loss and sore throat.    Eyes: Negative for pain and visual disturbance.   Respiratory: Negative for cough and shortness of breath.    Cardiovascular: Negative for chest pain, palpitations and peripheral edema.   Gastrointestinal: Negative for abdominal pain, constipation, diarrhea, heartburn, hematochezia and nausea.   Genitourinary: Negative for dysuria, frequency, genital sores, hematuria and urgency.   Musculoskeletal: Positive for arthralgias and myalgias. Negative for joint swelling.   Skin: Negative for rash.   Neurological: Negative for dizziness, weakness, headaches and paresthesias.   Psychiatric/Behavioral: Negative for mood changes. The patient is not nervous/anxious.           OBJECTIVE:   /80 (BP Location: Right arm, Patient Position: Chair, Cuff Size: Adult Large)   Pulse 74   Temp 97.4  F (36.3  C) (Temporal)   Ht 1.575 m (5' 2\")   Wt 86.6 kg (191 lb)   LMP 10/31/2018 (Exact Date)   SpO2 98%   BMI 34.93 kg/m    Physical Exam  GENERAL: healthy, alert and no distress  EYES: Eyes grossly normal to inspection, PERRL and conjunctivae and sclerae normal  HENT: ear canals and TM's normal, nose and mouth without ulcers or lesions  NECK: no adenopathy, no asymmetry, " "masses, or scars and thyroid normal to palpation  RESP: lungs clear to auscultation - no rales, rhonchi or wheezes  BREAST: normal without masses, tenderness or nipple discharge and no palpable axillary masses or adenopathy  CV: regular rate and rhythm, normal S1 S2, no S3 or S4, no murmur, click or rub, no peripheral edema and peripheral pulses strong  ABDOMEN: soft, nontender, no hepatosplenomegaly, no masses and bowel sounds normal  MS: mild TTP left lower back, otherwise unremarkable   SKIN: no suspicious lesions or rashes  NEURO: Normal strength and tone, mentation intact and speech normal  PSYCH: mentation appears normal, affect normal/bright    Diagnostic Test Results:  Labs reviewed in Epic  none     ASSESSMENT/PLAN:   (Z00.00) Routine general medical examination at a health care facility  (primary encounter diagnosis)  Plan: CBC with Platelets and Reflex to Iron Studies,         Comprehensive metabolic panel (BMP + Alb, Alk         Phos, ALT, AST, Total. Bili, TP), Hemoglobin         A1c, Lipid panel reflex to direct LDL         Non-fasting      (Z12.11) Screen for colon cancer  Plan: Adult Gastro Ref - Procedure Only      (Z11.59) Need for hepatitis C screening test  Plan: Hepatitis C Screen Reflex to HCV RNA Quant and         Genotype          (Z12.31) Visit for screening mammogram  Plan: MA SCREENING DIGITAL BILAT - Future  (s+30)      (R53.83) Fatigue, unspecified type  Comment: broaden database   Plan: TSH with free T4 reflex, Hemoglobin A1c,         Vitamin D Deficiency      (S39.012A) Strain of lumbar region, initial encounter  Comment: supportive care discussed   Plan: cyclobenzaprine (FLEXERIL) 5 MG tablet        COUNSELING:  Reviewed preventive health counseling, as reflected in patient instructions       Regular exercise       Healthy diet/nutrition    Estimated body mass index is 34.93 kg/m  as calculated from the following:    Height as of this encounter: 1.575 m (5' 2\").    Weight as of this " encounter: 86.6 kg (191 lb).        She reports that she has never smoked. She has never used smokeless tobacco.      Counseling Resources:  ATP IV Guidelines  Pooled Cohorts Equation Calculator  Breast Cancer Risk Calculator  BRCA-Related Cancer Risk Assessment: FHS-7 Tool  FRAX Risk Assessment  ICSI Preventive Guidelines  Dietary Guidelines for Americans, 2010  USDA's MyPlate  ASA Prophylaxis  Lung CA Screening    Nayely Stein MD  [facility]  Answers for HPI/ROS submitted by the patient on 4/22/2022  ROSA 7 TOTAL SCORE: 2

## 2022-04-23 LAB
ALBUMIN SERPL-MCNC: 3.9 G/DL (ref 3.4–5)
ALP SERPL-CCNC: 64 U/L (ref 40–150)
ALT SERPL W P-5'-P-CCNC: 19 U/L (ref 0–50)
ANION GAP SERPL CALCULATED.3IONS-SCNC: 6 MMOL/L (ref 3–14)
AST SERPL W P-5'-P-CCNC: 17 U/L (ref 0–45)
BILIRUB SERPL-MCNC: 0.3 MG/DL (ref 0.2–1.3)
BUN SERPL-MCNC: 16 MG/DL (ref 7–30)
CALCIUM SERPL-MCNC: 9 MG/DL (ref 8.5–10.1)
CHLORIDE BLD-SCNC: 106 MMOL/L (ref 94–109)
CHOLEST SERPL-MCNC: 201 MG/DL
CO2 SERPL-SCNC: 27 MMOL/L (ref 20–32)
CREAT SERPL-MCNC: 0.88 MG/DL (ref 0.52–1.04)
FASTING STATUS PATIENT QL REPORTED: YES
GFR SERPL CREATININE-BSD FRML MDRD: 79 ML/MIN/1.73M2
GLUCOSE BLD-MCNC: 90 MG/DL (ref 70–99)
HDLC SERPL-MCNC: 63 MG/DL
LDLC SERPL CALC-MCNC: 126 MG/DL
NONHDLC SERPL-MCNC: 138 MG/DL
POTASSIUM BLD-SCNC: 4.5 MMOL/L (ref 3.4–5.3)
PROT SERPL-MCNC: 7.5 G/DL (ref 6.8–8.8)
SODIUM SERPL-SCNC: 139 MMOL/L (ref 133–144)
TRIGL SERPL-MCNC: 61 MG/DL
TSH SERPL DL<=0.005 MIU/L-ACNC: 1.65 MU/L (ref 0.4–4)

## 2022-04-23 ASSESSMENT — ANXIETY QUESTIONNAIRES: GAD7 TOTAL SCORE: 2

## 2022-04-25 LAB
DEPRECATED CALCIDIOL+CALCIFEROL SERPL-MC: 26 UG/L (ref 20–75)
HCV AB SERPL QL IA: NONREACTIVE

## 2022-05-05 ENCOUNTER — MYC MEDICAL ADVICE (OUTPATIENT)
Dept: FAMILY MEDICINE | Facility: CLINIC | Age: 52
End: 2022-05-05
Payer: COMMERCIAL

## 2022-05-05 DIAGNOSIS — R53.83 FATIGUE, UNSPECIFIED TYPE: Primary | ICD-10-CM

## 2022-05-05 DIAGNOSIS — Z78.0 MENOPAUSE: ICD-10-CM

## 2022-05-05 NOTE — TELEPHONE ENCOUNTER
Please see patient MyChart message as follow up to result note sent 4/25/22. Please review and advise.     Yovani TRIVEDI RN

## 2022-05-05 NOTE — TELEPHONE ENCOUNTER
Spoke with pt  -Shared that provider or staff only call if a result is abnormal.   -Pt not happy with answer (symptoms COVID related) provided by provider.   -Pt has had these symptoms all her life, would like to see an Endocrinologist.   -Pt was referred back in 2017 but that provider as at [facility].  -Routing to provider for endocrinologist referral.    Rosalee SANTIZO RN, BSN, PHN - Patient Advocate Liaison - PAL RN  [facility]  [phone]

## 2022-05-05 NOTE — TELEPHONE ENCOUNTER
TOBY and sent [employer] response informing pt referral placed, call if ?'s  Annemarie Bradshaw, RN, BSN  [facility]

## 2022-05-05 NOTE — TELEPHONE ENCOUNTER
Patient was sent result notes following her visit. See 4/25 result notes. (please also educate - we do not call if If remains unremarkable). fatigued then it is likely due to her COVID from January. Symptoms can last several months- some we have seen have yet to fully recover from COVID fatigue. Advise vitamin D 1,000 international unit(s) daily.       NWD

## 2022-05-06 NOTE — TELEPHONE ENCOUNTER
Dr. Nasreen Wallace   Please see my chart message from patient   Would you like visit to discuss hormones? Or wait until Endo appt ?     Thank you,   Mari Laughlin, Registered Nurse  [facility]

## 2022-05-09 NOTE — TELEPHONE ENCOUNTER
If she would like to consider hormone replacement I suggest follow up with GYN for them to properly guide this.    Referral placed.     RIGOBERTO

## 2022-05-11 ENCOUNTER — OFFICE VISIT (OUTPATIENT)
Dept: OBGYN | Facility: CLINIC | Age: 52
End: 2022-05-11
Payer: COMMERCIAL

## 2022-05-11 VITALS — SYSTOLIC BLOOD PRESSURE: 116 MMHG | BODY MASS INDEX: 34.17 KG/M2 | DIASTOLIC BLOOD PRESSURE: 74 MMHG | WEIGHT: 186.8 LBS

## 2022-05-11 DIAGNOSIS — N95.2 VAGINAL ATROPHY: Primary | ICD-10-CM

## 2022-05-11 DIAGNOSIS — F43.21 ADJUSTMENT DISORDER WITH DEPRESSED MOOD: ICD-10-CM

## 2022-05-11 PROCEDURE — 99203 OFFICE O/P NEW LOW 30 MIN: CPT | Performed by: OBSTETRICS & GYNECOLOGY

## 2022-05-11 RX ORDER — CITALOPRAM HYDROBROMIDE 10 MG/1
10 TABLET ORAL DAILY
Qty: 30 TABLET | Refills: 11 | Status: SHIPPED | OUTPATIENT
Start: 2022-05-11 | End: 2023-06-20

## 2022-05-11 RX ORDER — ESTRADIOL 0.1 MG/G
CREAM VAGINAL
Qty: 42.5 G | Refills: 3 | Status: SHIPPED | OUTPATIENT
Start: 2022-05-11 | End: 2023-08-09

## 2022-05-11 ASSESSMENT — ANXIETY QUESTIONNAIRES
3. WORRYING TOO MUCH ABOUT DIFFERENT THINGS: NOT AT ALL
1. FEELING NERVOUS, ANXIOUS, OR ON EDGE: NOT AT ALL
6. BECOMING EASILY ANNOYED OR IRRITABLE: MORE THAN HALF THE DAYS
5. BEING SO RESTLESS THAT IT IS HARD TO SIT STILL: NOT AT ALL
2. NOT BEING ABLE TO STOP OR CONTROL WORRYING: NOT AT ALL
7. FEELING AFRAID AS IF SOMETHING AWFUL MIGHT HAPPEN: NOT AT ALL
GAD7 TOTAL SCORE: 3

## 2022-05-11 ASSESSMENT — PATIENT HEALTH QUESTIONNAIRE - PHQ9
SUM OF ALL RESPONSES TO PHQ QUESTIONS 1-9: 6
5. POOR APPETITE OR OVEREATING: SEVERAL DAYS

## 2022-05-11 NOTE — PATIENT INSTRUCTIONS
Vaginal Dryness    For dryness you can start by trying Replens, or another over the counter vaginal moisturizer.    Reveree is an over the counter vaginal moisturizer with hyaluronic acid that you can also try.    Try a silicone or oil based lubricant for intercourse to make it more comfortable. If you want to talk with an expert on lubricants try visiting the [employer] [facility] on [facility] in [facility] where they have a large variety of lubricants to try.   You can purchase lubricants at www.[employer] or amazon.com if they are hard to find elsewhere.     Understanding Menopause  Menopause marks the point where you ve gone 12 months in a row without a period. The average age for this is around 51, but it can happen at younger or older ages. During the months or years before menopause, your body goes through many changes. It may be helpful to understand these changes and what you can do about the symptoms that result.     Use a portable fan to help stay cool.    Symptoms  Perimenopause is sometimes called the menopause transition. It happens in the months or years before menopause. It may begin when you reach your mid-40s. During this time, your estrogen levels go up and down and then decrease. As a result, you may notice some of the following symptoms:  Menstrual periods that come more or less often than usual  Menstrual periods that are lighter or heavier than normal  Increased premenstrual syndrome (PMS) symptoms  Hot flashes  Night sweats  Mood swings  Vaginal dryness with possible painful sexual activity  Difficulty going to sleep or staying asleep  Decreased sexual drive and function  Urinating frequently  It is important to remember that you could become pregnant until 12 months have passed since your last menstrual period. Ask your healthcare provider about birth control choices.   Controlling symptoms  Your healthcare provider may suggest pills or an intrauterine device (IUD) that contain the  hormone progesterone. This can make your periods more regular and prevent excess bleeding. If you have symptoms due to lower estrogen levels, your healthcare provider may suggest pills that contain estrogen and/or progesterone. This is called hormone therapy (HT).  There are also other prescription medicines that help control some of the bothersome symptoms, like hot flashes, mood swings, and vaginal dryness.  Other ways for you to deal with symptoms are listed below.  Hot flashes. Wear layers that you can remove. Try all-cotton clothing, sheets, and blankets. Keep a glass of cold water by your bed.  Pain during sex. You can buy a silicone or oil-based lubricant or vaginal moisturizer in the drugstore that may help. You may also benefit from an estrogen cream for your vagina which is a prescription.  Mood swings. Talking to friends who are going through the same changes can sometimes help.          Hormone Changes During Menopause  Menopause is not a sudden change. During the months or years before menopause (perimenopause), your ovaries begin to run out of eggs. Your body makes less estrogen and progesterone. This may bring on symptoms such as hot flashes. You ve reached menopause when you have not had a period for 1 year. From that point on, you are in postmenopause.  Perimenopause  In the years leading up to menopause, your ovaries make less estrogen. You release fewer eggs and your periods become less regular.  Symptoms you may have:  Heavier or lighter periods  Longer or shorter time between periods  Hot flashes  Mood swings  Night sweats  Insomnia  Vaginal dryness  Urinary changes including incontinence and frequency  Postmenopause  After menopause, you make very little estrogen. As a result, the uterine lining does not thicken and your periods have ended.  Symptoms you may have:  No periods  Vaginal dryness  Hot flashes  Mood swings  Night sweats  Insomnia  Surgical menopause  Menopause can occur after a  surgical removal of the uterus (hysterectomy) if the ovaries are also removed. Estrogen and progesterone levels decrease quickly. This may cause sudden and severe symptoms.           Perimenopause  Menopause is when you stop having periods for good. For many women, this happens around age 50. The time of change before this is called perimenopause. It may start in your late 30s or 40s. It can last for months or years. During this time, your body makes lower levels of female hormones. This causes certain changes in your body. You may already have begun to feel the effects of these changes. By taking steps to relieve symptoms, you can still feel good.    The menstrual cycle  Hormones are chemicals that have specific jobs in the body. A menstrual cycle is caused by changes in the levels of 2 female hormones. These hormones are estrogen and progesterone. They are made by the ovaries. In a normal cycle, estrogen creates a lining in the uterus to allow for pregnancy. The ovary then releases an egg. This is called ovulation. Progesterone levels start to go up. If the egg is not fertilized, progesterone levels go down. This causes the lining in the uterus to be shed. This is the bleeding that is your period.  Changes in hormones  As a woman ages, her ovaries begin to make hormones less regularly. In some months, there may not be ovulation. Without ovulation, progesterone isn't secreted so a normal period doesn't occur. The menstrual cycle will be harder to predict. Over time, the ovaries stop working. This can cause symptoms. Some women who have had their uterus taken out (hysterectomy) but still have ovaries may still have symptoms. When estrogen levels reach their lowest point, periods will stop completely. This is menopause.  Symptoms of perimenopause  The change in hormones can cause physical symptoms. It can also cause emotional symptoms. These may include:  Periods that come more or less often  Periods that are lighter  or heavier than you re used to  Hot flashes, night sweats, or trouble sleeping  Vaginal dryness, which may make sex painful  Mood swings or fatigue  Palpitations  Sleep disturbances  Urinary symptoms including frequency and incontinence.  Medications that may help  Some medications can help ease the effects of perimenopause. These include:  Low-dose birth control pills. These often contain both estrogen and progesterone. They can help regulate your periods.  Hormone therapy (HT). This replaces some of the hormones your body has stopped making.  Antidepressants. These help balance brain chemicals that may decrease during this time. Signs of depression can include often feeling sad or hopeless. If you feel this way, be sure to talk to your health care provider.  Gabapentin. This is a medication also used to treat seizures. This controls hot flashes and night sweats in some women.  Clonidine. This medication may control hot flashes and night sweats.     There are a couple of supplements that seem to have a good track record in treating vasomotor symptoms. The caveat is that the FDA does not regulate supplements in terms of efficacy or safety, so they are not monitored like pharmaceuticals. If you do decide to try any supplements, use them for 6 weeks. If no benefit at that point, please stop as that is plenty of time to determine if they are working for you.     You can try Remifemin, which is a brand of Black Cohosh that is regulated by the regulatory body in [place].     You can instead try Estroven and add Remifemin if inadequate response after 4 weeks.     Relizen is the other supplement that some people have found beneficial.

## 2022-05-11 NOTE — PROGRESS NOTES
SUBJECTIVE:   CC: menopause                                               Sha Vidal is a 52 year old  female who presents to clinic today for management of menopausal symptoms for the past 2 years. Main complaints are irritability, weight gain, sleeplessness, fatigue, low libido. Was previously on 25 mcg levothyroxine and 40mg of prozac, both of which she stopped 2 years ago as she felt they weren't doing anything.   Rare hot flashes, not overly bothersome.   She does complain of vaginal dryness but not dyspareunia. No itching or discharge.   Plans to see Endo in September for further thyroid evaluation.     PHQ-9 of 6, ROSA-7 of 3    Problem list and histories reviewed & adjusted, as indicated.  Additional history: as documented.    Patient Active Problem List   Diagnosis     Hypothyroidism     CARDIOVASCULAR SCREENING; LDL GOAL LESS THAN 160     Migraine headache     Overweight     Vitiligo     Supraumbilical hernia     Adjustment disorder with mixed anxiety and depressed mood     Pain in both feet     Insomnia, unspecified type     Post-operative state     Plantar fasciitis     Plantar fascial fibromatosis of right foot     Pes planus of both feet     Hypothyroidism due to Hashimoto's thyroiditis     Factor XII deficiency (H)     Depression     Past Surgical History:   Procedure Laterality Date     ABDOMEN SURGERY      10/2007     APPENDECTOMY      10/2007     DAVINCI HERNIORRHAPHY VENTRAL N/A 8/15/2017    Procedure: DAVINCI HERNIORRHAPHY VENTRAL;  Robotic assisted incisional and supra-umbilical  hernia repair with mesh;  Surgeon: Damon Hall MD;  Location:  OR     DILATION AND CURETTAGE, HYSTEROSCOPY DIAGNOSTIC, COMBINED N/A 11/3/2017    Procedure: COMBINED DILATION AND CURETTAGE, HYSTEROSCOPY DIAGNOSTIC;  Exam Under Anesthesia, Diagnosistic Hysteroscopy, dilation and curettage, diagnostic laparoscopy and lysis of adhesions;  Surgeon: Neptali Fournier MD;  Location: [facility] OR     LAPAROSCOPIC  HYSTERECTOMY TOTAL, SALPINGECTOMY BILATERAL Bilateral 11/20/2018    Procedure: total laparoscopic hysterectomy, bilateral salpingectomy;  Surgeon: Neptali Fournier MD;  Location: [facility] OR     LAPAROSCOPY DIAGNOSTIC (GYN) N/A 11/3/2017    Procedure: LAPAROSCOPY DIAGNOSTIC (GYN);  diagnostic laparoscopy, lysis of adhesions;  Surgeon: Neptali Fournier MD;  Location: [facility] OR     ZZC NONSPECIFIC PROCEDURE      wisdom teeth extraction     Z NONSPECIFIC PROCEDURE      Bilateral tubal ligation     Z NONSPECIFIC PROCEDURE  1/01    Lasik     Z NONSPECIFIC PROCEDURE      explo lap for ARMANDO for SBO      Social History     Tobacco Use     Smoking status: Never Smoker     Smokeless tobacco: Never Used   Substance Use Topics     Alcohol use: Yes     Alcohol/week: 0.0 standard drinks     Comment: occasionally      Problem (# of Occurrences) Relation (Name,Age of Onset)    Coronary Artery Disease (1) Mother    Diabetes (2) Father: type 2, Maternal Grandmother            No current outpatient medications on file prior to visit.  No current facility-administered medications on file prior to visit.    Allergies   Allergen Reactions     No Known Drug Allergies        OBJECTIVE:   /74   Wt 84.7 kg (186 lb 12.8 oz)   LMP 10/31/2018 (Exact Date)   BMI 34.17 kg/m     Const: sitting in chair in no acute distress, comfortable  Eyes: no scleral icterus, EOMI  CV: regular rate, well perfused  Pulm: no increased work of breathing, no cough  Skin: warm and dry, no rashes/lesions  Psych: mood stable, appropriate affect  Abd: soft, no hepatosplenomegaly, non-tender to palpation  Neuro: A+Ox3   : External genitalia normal, very mildly atrophic, healthy tissue, normal architecture.  No obvious excoriations, lesions, or rashes. Bartholins, urethra, normal.  Normal moist pink vaginal mucosa.  SSE: surgically absent cervix, normal physiologic discharge.     ASSESSMENT/PLAN:                                                    Sha CONNOR  Young is a 52 year old female  here for evaluation of menopausal vs mood symptoms. After discussion of her most bothersome symptoms, I shared that I felt a low dose antidepressant might be more beneficial for her than systemic MHT.     1. Vaginal atrophy  - reviewed options for vaginal moisturizers and alternative vaginal estrogen formulations. She would like to try the cream. Also reviewed lubricants.  - estradiol (ESTRACE) 0.1 MG/GM vaginal cream; Apply a pea sized amount to the vagina daily for the first 14 days then twice weekly ongoing  Dispense: 42.5 g; Refill: 3    2. Adjustment disorder with depressed mood  - symptom onset of irritability, moodiness, and sleeplessness all started after getting off of her antidepressant. She will try celexa for 6-8w to evaluate for improvement. Reviewed potential to increase dose to 20mg daily if needed. If no improvement, we can try transdermal estrogen patch after a 2 month trial of celexa.   - citalopram (CELEXA) 10 MG tablet; Take 1 tablet (10 mg) by mouth daily  Dispense: 30 tablet; Refill: 11         Sruthi Guerrero MD  Obstetrics and Gynecology   [facility]

## 2022-05-11 NOTE — NURSING NOTE
"Chief Complaint   Patient presents with     Consult     For hormone replacement therapy for menopausal sx's. Patient is having sx's of fatigue, weight gain, mood changes, insomnia, low libido, vaginal dryness. She also has a thyroid condition, but can not get into be seen with Endo until September.        Initial /74   Wt 84.7 kg (186 lb 12.8 oz)   LMP 10/31/2018 (Exact Date)   BMI 34.17 kg/m   Estimated body mass index is 34.17 kg/m  as calculated from the following:    Height as of 22: 1.575 m (5' 2\").    Weight as of this encounter: 84.7 kg (186 lb 12.8 oz).  BP completed using cuff size: regular    Questioned patient about current smoking habits.  Pt. has never smoked.          The following HM Due: NONE      Adebayo Reyes CMA               " [facility]                                                                  CHIEF COMPLAINT: Follow-up JHON       HPI:   Started CPAP and he is struggling with it. Switched from full face mask to nasal mask which is better. Annoyed feels he is breathing his exhaled breath through the mask. Patient is using CPAP 4-5  hrs/night. Using humidifier. No snoring on CPAP. The pressure is okay. No significant daytime sleepiness. Patient could fall a sleep anytime he wants to sleep during the day and falls a sleep. No nodding off when driving. Bedtime is 11 pm and rise time is 6:30-8 am. Sleep onset is 5 minutes. ESS is 7. Quit smoking     Occasionally uses Albuterol with help in his cough       Past Medical History:   Diagnosis Date    Depression     Hypertension     Sleep apnea     no sleep studies       Past Surgical History:        Procedure Laterality Date    HAND SURGERY      LAPAROSCOPIC APPENDECTOMY N/A 11-26-13    LAPAROSCOPIC APPENDECTOMY                 TONSILLECTOMY      WISDOM TOOTH EXTRACTION         Allergies:  has No Known Allergies. Social History:    TOBACCO:   reports that he quit smoking about 2 years ago. His smoking use included cigarettes. He has a 11.00 pack-year smoking history. He has never used smokeless tobacco.  ETOH:   reports that he drinks alcohol.       Family History:       Problem Relation Age of Onset    Diabetes Mother     High Blood Pressure Mother     High Cholesterol Mother     Diabetes Father     High Blood Pressure Father     High Cholesterol Father     Heart Disease Brother         aortic stenosis    Cancer Maternal Grandmother     Cancer Maternal Grandfather     Cancer Paternal Grandmother     Cancer Paternal Grandfather        Current Medications:    Current Outpatient Medications:     Multiple Vitamins-Minerals (THERAPEUTIC MULTIVITAMIN-MINERALS) tablet, Take 2 tablets by mouth daily, · Moderate JHON with significant nocturnal desaturation. APAP 8-16 cmH2O. Suboptimal compliance upon review today. · Morbid obesity  · Mild restrictive defect on PFTs-likely due to obesity  · Bronchodilator response  · 14 pack year smoking - quit currently  · Preoperative clearance for bariatric surgery      Plan:       Change APAP 9-13 cmH2O- done today   Check mask today insure adequate permissible leak  Advised to use CPAP 6-8 hrs at night and during naps. Replacement of mask, tubing, head straps every 3-6 months or sooner if damaged. Follow up CPAP compliance and pressure adjustment if needed  Sleep hygiene  Avoid sedatives, alcohol and caffeinated drinks at bed time. No driving motorized vehicles or operating heavy machinery while fatigue, drowsy or sleepy. Weight loss is also recommended as a long-term intervention. Complications of JHON if not treated were discussed with patient patient to include systemic hypertension, pulmonary hypertension, cardiovascular morbidities, car accidents and all cause mortality. Continue Albuterol 2 puffs Q4-6 hrs PRN  Advised to continue with smoking cessation        Upon examination and discussion I have determined that, from a pulmonary standpoint, patient is clear for surgery. Patient has low preoperative pulmonary risk with 6.3% pulmonary complication rate. Perioperative pulmonary complications were discussed with the patient including atelectasis, infection and prolonged mechanical ventilation. I recommend:  1- Airway extubation (if patient requires intubation for surgery) performed only when the patient is fully awake and alert. 2- Removal of the endotracheal tube should take place in the operating room or PACU. 3- The patient should be maintained in the semiupright or lateral, not supine, position and should undergo continuous cardiorespiratory monitoring.   4- Immediate application of CPAP with or without a nasopharyngeal airway will help prevent upper airway collapse. 5- Systemic opioids should be used cautiously because of their ability to depress the respiratory drive  6- Benzodiazepines should be avoided because of their negative effects on the respiratory control and upper airway musculature.

## 2022-05-12 ASSESSMENT — ANXIETY QUESTIONNAIRES: GAD7 TOTAL SCORE: 3

## 2022-06-22 ENCOUNTER — TELEPHONE (OUTPATIENT)
Dept: FAMILY MEDICINE | Facility: CLINIC | Age: 52
End: 2022-06-22

## 2022-06-22 NOTE — LETTER
[facility]  [address] [facility] [facility] [address] [phone]  June 22, 2022    Sha Vidal  [address]  [place] [address]    Dear Sha,    I care about your health and have reviewed your health plan. I have reviewed your medical conditions, medication list, and lab results and am making recommendations based on this review, to better manage your health.    You are in particular need of attention regarding:  -Breast Cancer Screening    I am recommending that you:  {recommendations:-schedule a MAMMOGRAM which is due. We have mammogram available at our clinic; please call [phone].   Please disregard this reminder if you have had this exam elsewhere within the last year.  It would be helpful for us to have a copy of your mammogram report in our file so that we can best coordinate your care.    Here is a list of Health Maintenance topics that are due now or due soon:  Health Maintenance Due   Topic Date Due     ANNUAL REVIEW OF HM ORDERS  Never done     ADVANCE CARE PLANNING  Never done     COLORECTAL CANCER SCREENING  Never done     MAMMO SCREENING  08/05/2017     ZOSTER IMMUNIZATION (1 of 2) Never done       Please call us at [phone] (or use [employer]) to address the above recommendations.     Thank you for trusting [facility] and we appreciate the opportunity to serve you.  We look forward to supporting your healthcare needs in the future.    Healthy Regards,    Nayely Stein MD

## 2022-06-22 NOTE — TELEPHONE ENCOUNTER
Patient Quality Outreach    Patient is due for the following:   Breast Cancer Screening - Mammogram    NEXT STEPS:   Schedule a office visit for mammogram    Type of outreach:    Phone, left message for patient/parent to call back. and Sent letter.      Questions for provider review:    None     Shruthi Luna, [facility]

## 2022-09-14 ENCOUNTER — OFFICE VISIT (OUTPATIENT)
Dept: ENDOCRINOLOGY | Facility: CLINIC | Age: 52
End: 2022-09-14
Attending: FAMILY MEDICINE
Payer: COMMERCIAL

## 2022-09-14 VITALS
HEIGHT: 62 IN | HEART RATE: 62 BPM | DIASTOLIC BLOOD PRESSURE: 72 MMHG | SYSTOLIC BLOOD PRESSURE: 110 MMHG | OXYGEN SATURATION: 96 % | BODY MASS INDEX: 33.95 KG/M2 | WEIGHT: 184.5 LBS

## 2022-09-14 DIAGNOSIS — R53.83 FATIGUE, UNSPECIFIED TYPE: ICD-10-CM

## 2022-09-14 DIAGNOSIS — E03.9 HYPOTHYROIDISM, UNSPECIFIED TYPE: Primary | ICD-10-CM

## 2022-09-14 PROCEDURE — 99204 OFFICE O/P NEW MOD 45 MIN: CPT | Performed by: INTERNAL MEDICINE

## 2022-09-14 ASSESSMENT — ENCOUNTER SYMPTOMS
MUSCLE WEAKNESS: 0
DECREASED APPETITE: 0
HYPERTENSION: 0
INCREASED ENERGY: 1
WEIGHT LOSS: 0
ORTHOPNEA: 0
POLYPHAGIA: 0
LEG PAIN: 1
JOINT SWELLING: 0
STIFFNESS: 0
SKIN CHANGES: 0
PALPITATIONS: 0
MUSCLE CRAMPS: 0
ARTHRALGIAS: 0
WEIGHT GAIN: 0
SYNCOPE: 0
HALLUCINATIONS: 0
CHILLS: 0
POOR WOUND HEALING: 1
EXERCISE INTOLERANCE: 0
FEVER: 0
BRUISES/BLEEDS EASILY: 1
SLEEP DISTURBANCES DUE TO BREATHING: 0
NECK PAIN: 1
NIGHT SWEATS: 0
FATIGUE: 1
BACK PAIN: 1
LIGHT-HEADEDNESS: 0
MYALGIAS: 1
POLYDIPSIA: 0
SWOLLEN GLANDS: 0
HYPOTENSION: 0
NAIL CHANGES: 0
ALTERED TEMPERATURE REGULATION: 1

## 2022-09-14 ASSESSMENT — PAIN SCALES - GENERAL: PAINLEVEL: NO PAIN (0)

## 2022-09-14 NOTE — Clinical Note
Hi there-- I think Sha could benefit from a dose increase of her Celexa-- noted improvement in her fatigue, etc after starting but now a plateau of effects. Wanted to pass this along as I'm not managing the celexa.  Thanks!

## 2022-09-14 NOTE — PROGRESS NOTES
"Sha Vidal is a 52 year old yo female who presents today for evaluation of Hashimoto's Hypothyroidism. Referred by PCP. She also has history of Factor XII deficiency taking vitamin K. Underwent hysterectomy 2018 for severe menorrhagia.    Chief Complaint   Patient presents with     New Patient     Fatigue, unspecified type       1) Hashimoto's Hypothyroidism  Diagnosis Found 20+ years ago (~1990) post pregnancy-- had two children back to back and after second child had significant fatigue, weight gain, menorrhagia. Found on testing after that-- started low dose levothyroxine without significant change in symptoms. She was managed by multiple endocrinologists over the years and mostly with PCP since 2015. Diagnosed with \"Hashimoto's\" although unclear if antibody testing was ever completed.     Treatment currently none- stopped levothyroxine 25mcg in Feb 2019, no difference on/off thyroid     Thyroid ROS:  Notes ~6lbs weight gain over last 2-3 years, persistent fatigue,   Some brittleness of fingernails (wears acrylic nails), hair loss specirically at temples (thinning)  Notes some itchiness especially over knees-- did allergy testing and unable to identify trigger  Notes irritability, mood fluctuations-- started on citalopram by PCP and with improvement but not as dramatic as would like    Denies other signs/symptoms of hypo/hyperthyroidism including hot/cold intolerance, diarrhea/constipation, major changes in hair skin or nails, tremor, palpations, or fatigue.  Denies any difficulty breathing, difficulty swallowing, shortness of breath, enlargement of neck/thyroid, family history of thyroid cancer, exposure to radiation, hoarseness or weight loss.   No previous thyroid imaging            Wt Readings from Last 5 Encounters:   09/14/22 83.7 kg (184 lb 8 oz)   05/11/22 84.7 kg (186 lb 12.8 oz)   04/22/22 86.6 kg (191 lb)   01/31/19 80.7 kg (178 lb)   12/10/18 79.5 kg (175 lb 3.2 oz)       Active diagnoses this " visit:     Fatigue, unspecified type  Hypothyroidism, unspecified type     ROS: 10 point ROS neg other than the symptoms noted above in the HPI.      Medical, surgical, social, and family histories, medications and allergies reviewed and updated.  Past Medical History:   Diagnosis Date     Allergic rhinitis, cause unspecified      Bleeding disorder (H)     factor 12     CARDIOVASCULAR SCREENING; LDL GOAL LESS THAN 160 10/31/2010     Hashimoto's thyroiditis      Lateral epicondylitis 3/13/2009     Migraine headache 2/19/2013     NONSPECIFIC MEDICAL HISTORY 2002    Factor XII deficiency (asymptomatic)     NONSPECIFIC MEDICAL HISTORY 2002    GRAYSON positive 1:320, seen by Rheum (negative PHUONG eval)       Past Surgical History:   Procedure Laterality Date     ABDOMEN SURGERY      10/2007     APPENDECTOMY      10/2007     DAVINCI HERNIORRHAPHY VENTRAL N/A 8/15/2017    Procedure: DAVINCI HERNIORRHAPHY VENTRAL;  Robotic assisted incisional and supra-umbilical  hernia repair with mesh;  Surgeon: Damon Hall MD;  Location: [facility] OR     DILATION AND CURETTAGE, HYSTEROSCOPY DIAGNOSTIC, COMBINED N/A 11/3/2017    Procedure: COMBINED DILATION AND CURETTAGE, HYSTEROSCOPY DIAGNOSTIC;  Exam Under Anesthesia, Diagnosistic Hysteroscopy, dilation and curettage, diagnostic laparoscopy and lysis of adhesions;  Surgeon: Neptali Fournier MD;  Location: [facility] OR     LAPAROSCOPIC HYSTERECTOMY TOTAL, SALPINGECTOMY BILATERAL Bilateral 11/20/2018    Procedure: total laparoscopic hysterectomy, bilateral salpingectomy;  Surgeon: Neptali Fournier MD;  Location: [facility] OR     LAPAROSCOPY DIAGNOSTIC (GYN) N/A 11/3/2017    Procedure: LAPAROSCOPY DIAGNOSTIC (GYN);  diagnostic laparoscopy, lysis of adhesions;  Surgeon: Neptali Fournier MD;  Location: [facility] OR     ZZC NONSPECIFIC PROCEDURE      wisdom teeth extraction     [facility] NONSPECIFIC PROCEDURE      Bilateral tubal ligation     Z NONSPECIFIC PROCEDURE  1/01    Lasik     [facility] NONSPECIFIC PROCEDURE       "explo lap for ARMANDO for SBO       Allergies:  No known drug allergies    Social History     Tobacco Use     Smoking status: Never Smoker     Smokeless tobacco: Never Used   Substance Use Topics     Alcohol use: Yes     Alcohol/week: 0.0 standard drinks     Comment: occasionally       Family History   Problem Relation Age of Onset     Diabetes Father         type 2     Coronary Artery Disease Mother      Diabetes Maternal Grandmother          Objective:  /72 (BP Location: Left arm, Patient Position: Sitting, Cuff Size: Adult Regular)   Pulse 62   Ht 1.575 m (5' 2\")   Wt 83.7 kg (184 lb 8 oz)   LMP 10/31/2018 (Exact Date)   SpO2 96%   BMI 33.75 kg/m      Exam:  Constitutional: healthy, alert, no acute distress  Head: Normocephalic. No masses, lesions, no exophthalmos/proptosis  ENT: normal thyroid, no palpable nodules, no cervical lymph nodes  Respiratory: nonlabored  Gastrointestinal: Abdomen soft, non-tender.  Musculoskeletal: extremities normal- no gross deformities noted, gait normal and normal muscle tone  Skin: no suspicious lesions or rashes  Neurologic: Gait normal. sensation grossly intact  Psychiatric: mentation appears normal, calm      Lab Results   Component Value Date/Time    TSH 1.65 04/22/2022 11:10 AM    TSH 3.06 04/17/2018 07:55 AM    T4 1.31 04/20/2004 04:07 PM     Last Comprehensive Metabolic Panel:  Sodium   Date Value Ref Range Status   04/22/2022 139 133 - 144 mmol/L Final   04/17/2018 141 133 - 144 mmol/L Final     Potassium   Date Value Ref Range Status   04/22/2022 4.5 3.4 - 5.3 mmol/L Final   04/17/2018 4.3 3.4 - 5.3 mmol/L Final     Chloride   Date Value Ref Range Status   04/22/2022 106 94 - 109 mmol/L Final   04/17/2018 109 94 - 109 mmol/L Final     Carbon Dioxide   Date Value Ref Range Status   04/17/2018 25 20 - 32 mmol/L Final     Carbon Dioxide (CO2)   Date Value Ref Range Status   04/22/2022 27 20 - 32 mmol/L Final     Anion Gap   Date Value Ref Range Status   04/22/2022 6 " 3 - 14 mmol/L Final   04/17/2018 7 3 - 14 mmol/L Final     Glucose   Date Value Ref Range Status   04/22/2022 90 70 - 99 mg/dL Final   11/21/2018 89 70 - 99 mg/dL Final     Urea Nitrogen   Date Value Ref Range Status   04/22/2022 16 7 - 30 mg/dL Final   04/17/2018 14 7 - 30 mg/dL Final     Creatinine   Date Value Ref Range Status   04/22/2022 0.88 0.52 - 1.04 mg/dL Final   11/21/2018 0.80 0.52 - 1.04 mg/dL Final     GFR Estimate   Date Value Ref Range Status   04/22/2022 79 >60 mL/min/1.73m2 Final     Comment:     Effective December 21, 2021 eGFRcr in adults is calculated using the 2021 CKD-EPI creatinine equation which includes age and gender (Priscilla davis al., [place], DOI: 10.1056/UMAYdg1880889)   11/21/2018 76 >60 mL/min/1.7m2 Final     Comment:     Non  GFR Calc     Calcium   Date Value Ref Range Status   04/22/2022 9.0 8.5 - 10.1 mg/dL Final   04/17/2018 8.4 (L) 8.5 - 10.1 mg/dL Final           ASSESSMENT / PLAN:  (R53.83) Fatigue, unspecified type    1) Hypothyroidism, off levothyroxine  - Unclear how initial diagnosis was made, but not needing treatment regularly  - Recommend recheck complete TFTs for now with TPO antibody to confirm diagnosis    2) Fatigue  - Will check AM cortisol/ACTH, no other symptoms of adrenal insufficiency    3) Perimenopause symptoms  - Following with GYN  - Started on vaginal cream, no change in symptom  - Started on celexa, can consider dose increase, will route to PCP for assistance/management    4) Vitamin D Deficiency  - Start vitamin d 1000u daily      Orders Placed This Encounter   Procedures     Cortisol     Adrenal corticotropin     TSH     T4, free     T3, total     Thyroid peroxidase antibody         RTC 1 year    A total of 30 minutes were spent today 09/14/22 on this visit including documentation, chart review with greater than 50% of time spent on direct counseling.    Answers for HPI/ROS submitted by the patient on 9/14/2022  General Symptoms: Yes  Skin  Symptoms: Yes  HENT Symptoms: No  EYE SYMPTOMS: No  HEART SYMPTOMS: Yes  LUNG SYMPTOMS: No  INTESTINAL SYMPTOMS: No  URINARY SYMPTOMS: No  GYNECOLOGIC SYMPTOMS: No  BREAST SYMPTOMS: No  SKELETAL SYMPTOMS: Yes  BLOOD SYMPTOMS: Yes  NERVOUS SYSTEM SYMPTOMS: No  MENTAL HEALTH SYMPTOMS: No  Fever: No  Loss of appetite: No  Weight loss: No  Weight gain: No  Fatigue: Yes  Night sweats: No  Chills: No  Increased stress: No  Excessive hunger: No  Excessive thirst: No  Feeling hot or cold when others believe the temperature is normal: Yes  Loss of height: No  Post-operative complications: No  Surgical site pain: No  Hallucinations: No  Change in or Loss of Energy: Yes  Hyperactivity: No  Confusion: No  Changes in hair: No  Changes in moles/birth marks: No  Itching: Yes  Rashes: No  Changes in nails: No  Acne: No  Hair in places you don't want it: No  Change in facial hair: No  Warts: No  Non-healing sores: Yes  Scarring: Yes  Flaking of skin: No  Color changes of hands/feet in cold : No  Sun sensitivity: No  Skin thickening: No  Chest pain or pressure: No  Fast or irregular heartbeat: No  Pain in legs with walking: Yes  Trouble breathing while lying down: No  Fingers or toes appear blue: No  High blood pressure: No  Low blood pressure: No  Fainting: No  Murmurs: No  Pacemaker: No  Varicose veins: No  Wake up at night with shortness of breath: No  Light-headedness: No  Exercise intolerance: No  Back pain: Yes  Muscle aches: Yes  Neck pain: Yes  Swollen joints: No  Joint pain: No  Bone pain: Yes  Muscle cramps: No  Muscle weakness: No  Joint stiffness: No  Bone fracture: No  Edema or swelling: No  Anemia: No  Swollen glands: No  Easy bleeding or bruising: Yes

## 2022-09-14 NOTE — LETTER
"    9/14/2022         RE: Sha Vidal  [address]  [place] [address]        Dear Colleague,    Thank you for referring your patient, Sha Vidal, to the [facility] [place]. Please see a copy of my visit note below.    Sha Vidal is a 52 year old yo female who presents today for evaluation of Hashimoto's Hypothyroidism. Referred by PCP. She also has history of Factor XII deficiency taking vitamin K. Underwent hysterectomy 2018 for severe menorrhagia.    Chief Complaint   Patient presents with     New Patient     Fatigue, unspecified type       1) Hashimoto's Hypothyroidism  Diagnosis Found 20+ years ago (~1990) post pregnancy-- had two children back to back and after second child had significant fatigue, weight gain, menorrhagia. Found on testing after that-- started low dose levothyroxine without significant change in symptoms. She was managed by multiple endocrinologists over the years and mostly with PCP since 2015. Diagnosed with \"Hashimoto's\" although unclear if antibody testing was ever completed.     Treatment currently none- stopped levothyroxine 25mcg in Feb 2019, no difference on/off thyroid     Thyroid ROS:  Notes ~6lbs weight gain over last 2-3 years, persistent fatigue,   Some brittleness of fingernails (wears acrylic nails), hair loss specirically at temples (thinning)  Notes some itchiness especially over knees-- did allergy testing and unable to identify trigger  Notes irritability, mood fluctuations-- started on citalopram by PCP and with improvement but not as dramatic as would like    Denies other signs/symptoms of hypo/hyperthyroidism including hot/cold intolerance, diarrhea/constipation, major changes in hair skin or nails, tremor, palpations, or fatigue.  Denies any difficulty breathing, difficulty swallowing, shortness of breath, enlargement of neck/thyroid, family history of thyroid cancer, exposure to radiation, hoarseness or weight loss.   No " previous thyroid imaging            Wt Readings from Last 5 Encounters:   09/14/22 83.7 kg (184 lb 8 oz)   05/11/22 84.7 kg (186 lb 12.8 oz)   04/22/22 86.6 kg (191 lb)   01/31/19 80.7 kg (178 lb)   12/10/18 79.5 kg (175 lb 3.2 oz)       Active diagnoses this visit:     Fatigue, unspecified type  Hypothyroidism, unspecified type     ROS: 10 point ROS neg other than the symptoms noted above in the HPI.      Medical, surgical, social, and family histories, medications and allergies reviewed and updated.  Past Medical History:   Diagnosis Date     Allergic rhinitis, cause unspecified      Bleeding disorder (H)     factor 12     CARDIOVASCULAR SCREENING; LDL GOAL LESS THAN 160 10/31/2010     Hashimoto's thyroiditis      Lateral epicondylitis 3/13/2009     Migraine headache 2/19/2013     NONSPECIFIC MEDICAL HISTORY 2002    Factor XII deficiency (asymptomatic)     NONSPECIFIC MEDICAL HISTORY 2002    GRAYSON positive 1:320, seen by Rheum (negative PHUONG eval)       Past Surgical History:   Procedure Laterality Date     ABDOMEN SURGERY      10/2007     APPENDECTOMY      10/2007     DAVINCI HERNIORRHAPHY VENTRAL N/A 8/15/2017    Procedure: DAVINCI HERNIORRHAPHY VENTRAL;  Robotic assisted incisional and supra-umbilical  hernia repair with mesh;  Surgeon: Damon Hall MD;  Location: [facility] OR     DILATION AND CURETTAGE, HYSTEROSCOPY DIAGNOSTIC, COMBINED N/A 11/3/2017    Procedure: COMBINED DILATION AND CURETTAGE, HYSTEROSCOPY DIAGNOSTIC;  Exam Under Anesthesia, Diagnosistic Hysteroscopy, dilation and curettage, diagnostic laparoscopy and lysis of adhesions;  Surgeon: Neptali Fournier MD;  Location: [facility] OR     LAPAROSCOPIC HYSTERECTOMY TOTAL, SALPINGECTOMY BILATERAL Bilateral 11/20/2018    Procedure: total laparoscopic hysterectomy, bilateral salpingectomy;  Surgeon: Neptali Fournier MD;  Location: [facility] OR     LAPAROSCOPY DIAGNOSTIC (GYN) N/A 11/3/2017    Procedure: LAPAROSCOPY DIAGNOSTIC (GYN);  diagnostic laparoscopy, lysis of  "adhesions;  Surgeon: Neptali Fournier MD;  Location:  OR     ZC NONSPECIFIC PROCEDURE      wisdom teeth extraction     ZZ NONSPECIFIC PROCEDURE      Bilateral tubal ligation     ZZ NONSPECIFIC PROCEDURE  1/01    Lasik     Z NONSPECIFIC PROCEDURE      explo lap for ARMANDO for SBO       Allergies:  No known drug allergies    Social History     Tobacco Use     Smoking status: Never Smoker     Smokeless tobacco: Never Used   Substance Use Topics     Alcohol use: Yes     Alcohol/week: 0.0 standard drinks     Comment: occasionally       Family History   Problem Relation Age of Onset     Diabetes Father         type 2     Coronary Artery Disease Mother      Diabetes Maternal Grandmother          Objective:  /72 (BP Location: Left arm, Patient Position: Sitting, Cuff Size: Adult Regular)   Pulse 62   Ht 1.575 m (5' 2\")   Wt 83.7 kg (184 lb 8 oz)   LMP 10/31/2018 (Exact Date)   SpO2 96%   BMI 33.75 kg/m      Exam:  Constitutional: healthy, alert, no acute distress  Head: Normocephalic. No masses, lesions, no exophthalmos/proptosis  ENT: normal thyroid, no palpable nodules, no cervical lymph nodes  Respiratory: nonlabored  Gastrointestinal: Abdomen soft, non-tender.  Musculoskeletal: extremities normal- no gross deformities noted, gait normal and normal muscle tone  Skin: no suspicious lesions or rashes  Neurologic: Gait normal. sensation grossly intact  Psychiatric: mentation appears normal, calm      Lab Results   Component Value Date/Time    TSH 1.65 04/22/2022 11:10 AM    TSH 3.06 04/17/2018 07:55 AM    T4 1.31 04/20/2004 04:07 PM     Last Comprehensive Metabolic Panel:  Sodium   Date Value Ref Range Status   04/22/2022 139 133 - 144 mmol/L Final   04/17/2018 141 133 - 144 mmol/L Final     Potassium   Date Value Ref Range Status   04/22/2022 4.5 3.4 - 5.3 mmol/L Final   04/17/2018 4.3 3.4 - 5.3 mmol/L Final     Chloride   Date Value Ref Range Status   04/22/2022 106 94 - 109 mmol/L Final   04/17/2018 " 109 94 - 109 mmol/L Final     Carbon Dioxide   Date Value Ref Range Status   04/17/2018 25 20 - 32 mmol/L Final     Carbon Dioxide (CO2)   Date Value Ref Range Status   04/22/2022 27 20 - 32 mmol/L Final     Anion Gap   Date Value Ref Range Status   04/22/2022 6 3 - 14 mmol/L Final   04/17/2018 7 3 - 14 mmol/L Final     Glucose   Date Value Ref Range Status   04/22/2022 90 70 - 99 mg/dL Final   11/21/2018 89 70 - 99 mg/dL Final     Urea Nitrogen   Date Value Ref Range Status   04/22/2022 16 7 - 30 mg/dL Final   04/17/2018 14 7 - 30 mg/dL Final     Creatinine   Date Value Ref Range Status   04/22/2022 0.88 0.52 - 1.04 mg/dL Final   11/21/2018 0.80 0.52 - 1.04 mg/dL Final     GFR Estimate   Date Value Ref Range Status   04/22/2022 79 >60 mL/min/1.73m2 Final     Comment:     Effective December 21, 2021 eGFRcr in adults is calculated using the 2021 CKD-EPI creatinine equation which includes age and gender (Priscilla et al., NEJM, DOI: 10.1056/AMZZan6193159)   11/21/2018 76 >60 mL/min/1.7m2 Final     Comment:     Non  GFR Calc     Calcium   Date Value Ref Range Status   04/22/2022 9.0 8.5 - 10.1 mg/dL Final   04/17/2018 8.4 (L) 8.5 - 10.1 mg/dL Final           ASSESSMENT / PLAN:  (R53.83) Fatigue, unspecified type    1) Hypothyroidism, off levothyroxine  - Unclear how initial diagnosis was made, but not needing treatment regularly  - Recommend recheck complete TFTs for now with TPO antibody to confirm diagnosis    2) Fatigue  - Will check AM cortisol/ACTH, no other symptoms of adrenal insufficiency    3) Perimenopause symptoms  - Following with GYN  - Started on vaginal cream, no change in symptom  - Started on celexa, can consider dose increase, will route to PCP for assistance/management    4) Vitamin D Deficiency  - Start vitamin d 1000u daily      Orders Placed This Encounter   Procedures     Cortisol     Adrenal corticotropin     TSH     T4, free     T3, total     Thyroid peroxidase antibody         RTC  1 year    A total of 30 minutes were spent today 09/14/22 on this visit including documentation, chart review with greater than 50% of time spent on direct counseling.    Answers for HPI/ROS submitted by the patient on 9/14/2022  General Symptoms: Yes  Skin Symptoms: Yes  HENT Symptoms: No  EYE SYMPTOMS: No  HEART SYMPTOMS: Yes  LUNG SYMPTOMS: No  INTESTINAL SYMPTOMS: No  URINARY SYMPTOMS: No  GYNECOLOGIC SYMPTOMS: No  BREAST SYMPTOMS: No  SKELETAL SYMPTOMS: Yes  BLOOD SYMPTOMS: Yes  NERVOUS SYSTEM SYMPTOMS: No  MENTAL HEALTH SYMPTOMS: No  Fever: No  Loss of appetite: No  Weight loss: No  Weight gain: No  Fatigue: Yes  Night sweats: No  Chills: No  Increased stress: No  Excessive hunger: No  Excessive thirst: No  Feeling hot or cold when others believe the temperature is normal: Yes  Loss of height: No  Post-operative complications: No  Surgical site pain: No  Hallucinations: No  Change in or Loss of Energy: Yes  Hyperactivity: No  Confusion: No  Changes in hair: No  Changes in moles/birth marks: No  Itching: Yes  Rashes: No  Changes in nails: No  Acne: No  Hair in places you don't want it: No  Change in facial hair: No  Warts: No  Non-healing sores: Yes  Scarring: Yes  Flaking of skin: No  Color changes of hands/feet in cold : No  Sun sensitivity: No  Skin thickening: No  Chest pain or pressure: No  Fast or irregular heartbeat: No  Pain in legs with walking: Yes  Trouble breathing while lying down: No  Fingers or toes appear blue: No  High blood pressure: No  Low blood pressure: No  Fainting: No  Murmurs: No  Pacemaker: No  Varicose veins: No  Wake up at night with shortness of breath: No  Light-headedness: No  Exercise intolerance: No  Back pain: Yes  Muscle aches: Yes  Neck pain: Yes  Swollen joints: No  Joint pain: No  Bone pain: Yes  Muscle cramps: No  Muscle weakness: No  Joint stiffness: No  Bone fracture: No  Edema or swelling: No  Anemia: No  Swollen glands: No  Easy bleeding or bruising:  Yes          Again, thank you for allowing me to participate in the care of your patient.        Sincerely,        Dorothea Zavala MD

## 2022-09-14 NOTE — NURSING NOTE
"Chief Complaint   Patient presents with     New Patient     Fatigue, unspecified type       Vitals:    09/14/22 1203   BP: 110/72   BP Location: Left arm   Patient Position: Sitting   Cuff Size: Adult Regular   Pulse: 62   SpO2: 96%   Weight: 83.7 kg (184 lb 8 oz)   Height: 1.575 m (5' 2\")       Body mass index is 33.75 kg/m .    Loretta Ellington, GUEVARAF    "

## 2022-09-15 ENCOUNTER — TELEPHONE (OUTPATIENT)
Dept: FAMILY MEDICINE | Facility: CLINIC | Age: 52
End: 2022-09-15

## 2022-09-15 NOTE — TELEPHONE ENCOUNTER
"Called pt to discuss, informs NWD was not the prescriber for this, informs will contact prescriber if desires dose increase, prefers not to have another visit at this time    \"Nayely Stein MD  P Cr Triage  Please reach out to patient as per endo note. She can reach out to her PCP or see me regarding her mood.     NWD\"     Annemarie Bradshaw RN, BSN  [employer]    "

## 2022-09-30 ENCOUNTER — LAB (OUTPATIENT)
Dept: LAB | Facility: CLINIC | Age: 52
End: 2022-09-30
Payer: COMMERCIAL

## 2022-09-30 DIAGNOSIS — E03.9 HYPOTHYROIDISM, UNSPECIFIED TYPE: ICD-10-CM

## 2022-09-30 DIAGNOSIS — R53.83 FATIGUE, UNSPECIFIED TYPE: ICD-10-CM

## 2022-09-30 LAB
CORTIS SERPL-MCNC: 12.5 UG/DL
T3 SERPL-MCNC: 81 NG/DL (ref 85–202)
T4 FREE SERPL-MCNC: 0.89 NG/DL (ref 0.76–1.46)
TSH SERPL DL<=0.005 MIU/L-ACNC: 2.17 MU/L (ref 0.4–4)

## 2022-09-30 PROCEDURE — 82024 ASSAY OF ACTH: CPT

## 2022-09-30 PROCEDURE — 86376 MICROSOMAL ANTIBODY EACH: CPT

## 2022-09-30 PROCEDURE — 36415 COLL VENOUS BLD VENIPUNCTURE: CPT

## 2022-09-30 PROCEDURE — 84443 ASSAY THYROID STIM HORMONE: CPT

## 2022-09-30 PROCEDURE — 82533 TOTAL CORTISOL: CPT

## 2022-09-30 PROCEDURE — 84439 ASSAY OF FREE THYROXINE: CPT

## 2022-09-30 PROCEDURE — 84480 ASSAY TRIIODOTHYRONINE (T3): CPT

## 2022-10-03 LAB
ACTH PLAS-MCNC: 15 PG/ML
THYROPEROXIDASE AB SERPL-ACNC: 110 IU/ML

## 2022-10-06 NOTE — RESULT ENCOUNTER NOTE
Dear Sha,     Here are your recent results which are within the expected range. Cortisol is normal, so doesn't explain your fatigue. The TPO antibody is elevated, indicating you have an elevated risk for developing Hashimoto's thyroid disease in the future but we treat based on the TFTs and it's reasonable to continue off levothyroxine if that's your preferece. They are low range normal so also reasonable to try ~50mcg to see if you feel better ( I know you didn't before in the past) . Please continue with your current plan of care and let us know if you have any questions or concerns.    Regards,  Dorothea Zavala MD

## 2022-10-09 ENCOUNTER — HEALTH MAINTENANCE LETTER (OUTPATIENT)
Age: 52
End: 2022-10-09

## 2022-11-21 DIAGNOSIS — E03.9 HYPOTHYROIDISM, UNSPECIFIED TYPE: Primary | ICD-10-CM

## 2022-11-21 RX ORDER — LEVOTHYROXINE SODIUM 25 UG/1
25 TABLET ORAL DAILY
Qty: 90 TABLET | Refills: 3 | Status: SHIPPED | OUTPATIENT
Start: 2022-11-21 | End: 2023-02-21

## 2023-02-15 ENCOUNTER — LAB (OUTPATIENT)
Dept: LAB | Facility: CLINIC | Age: 53
End: 2023-02-15
Payer: COMMERCIAL

## 2023-02-15 ENCOUNTER — OFFICE VISIT (OUTPATIENT)
Dept: ENDOCRINOLOGY | Facility: CLINIC | Age: 53
End: 2023-02-15
Payer: COMMERCIAL

## 2023-02-15 VITALS
HEIGHT: 62 IN | BODY MASS INDEX: 36.71 KG/M2 | SYSTOLIC BLOOD PRESSURE: 113 MMHG | OXYGEN SATURATION: 96 % | WEIGHT: 199.5 LBS | DIASTOLIC BLOOD PRESSURE: 79 MMHG | HEART RATE: 69 BPM

## 2023-02-15 DIAGNOSIS — E66.812 CLASS 2 OBESITY WITH BODY MASS INDEX (BMI) OF 36.0 TO 36.9 IN ADULT, UNSPECIFIED OBESITY TYPE, UNSPECIFIED WHETHER SERIOUS COMORBIDITY PRESENT: ICD-10-CM

## 2023-02-15 DIAGNOSIS — E03.9 HYPOTHYROIDISM, UNSPECIFIED TYPE: ICD-10-CM

## 2023-02-15 DIAGNOSIS — E03.9 HYPOTHYROIDISM, UNSPECIFIED TYPE: Primary | ICD-10-CM

## 2023-02-15 LAB
ANION GAP SERPL CALCULATED.3IONS-SCNC: 11 MMOL/L (ref 7–15)
BUN SERPL-MCNC: 22.7 MG/DL (ref 6–20)
CALCIUM SERPL-MCNC: 9.5 MG/DL (ref 8.6–10)
CHLORIDE SERPL-SCNC: 102 MMOL/L (ref 98–107)
CREAT SERPL-MCNC: 0.81 MG/DL (ref 0.51–0.95)
DEPRECATED CALCIDIOL+CALCIFEROL SERPL-MC: 37 UG/L (ref 20–75)
DEPRECATED HCO3 PLAS-SCNC: 26 MMOL/L (ref 22–29)
GFR SERPL CREATININE-BSD FRML MDRD: 86 ML/MIN/1.73M2
GLUCOSE SERPL-MCNC: 99 MG/DL (ref 70–99)
HBA1C MFR BLD: 5.3 % (ref 0–5.6)
INSULIN SERPL-ACNC: 4.8 UU/ML (ref 2.6–24.9)
POTASSIUM SERPL-SCNC: 4.4 MMOL/L (ref 3.4–5.3)
SODIUM SERPL-SCNC: 139 MMOL/L (ref 136–145)
T4 FREE SERPL-MCNC: 1.05 NG/DL (ref 0.9–1.7)
TSH SERPL DL<=0.005 MIU/L-ACNC: 2.36 UIU/ML (ref 0.3–4.2)

## 2023-02-15 PROCEDURE — 84439 ASSAY OF FREE THYROXINE: CPT

## 2023-02-15 PROCEDURE — 36415 COLL VENOUS BLD VENIPUNCTURE: CPT

## 2023-02-15 PROCEDURE — 83036 HEMOGLOBIN GLYCOSYLATED A1C: CPT

## 2023-02-15 PROCEDURE — 82306 VITAMIN D 25 HYDROXY: CPT

## 2023-02-15 PROCEDURE — 80048 BASIC METABOLIC PNL TOTAL CA: CPT

## 2023-02-15 PROCEDURE — 83525 ASSAY OF INSULIN: CPT

## 2023-02-15 PROCEDURE — 84443 ASSAY THYROID STIM HORMONE: CPT

## 2023-02-15 PROCEDURE — 99213 OFFICE O/P EST LOW 20 MIN: CPT | Performed by: INTERNAL MEDICINE

## 2023-02-15 ASSESSMENT — PAIN SCALES - GENERAL: PAINLEVEL: NO PAIN (0)

## 2023-02-15 NOTE — NURSING NOTE
"Chief Complaint   Patient presents with     Follow Up     Hypothyroidism, unspecified type +1 more       Vitals:    02/15/23 0729   BP: 113/79   BP Location: Left arm   Patient Position: Sitting   Cuff Size: Adult Large   Pulse: 69   SpO2: 96%   Weight: 90.5 kg (199 lb 8 oz)   Height: 1.575 m (5' 2\")       Body mass index is 36.49 kg/m .    Loretta Ellington, ICVF  "

## 2023-02-15 NOTE — LETTER
"    2/15/2023         RE: Sha Vidal  [address]  [facility] [address]        Dear Colleague,    Thank you for referring your patient, Sha Vidal, to the [facility] [place]. Please see a copy of my visit note below.    Sha Vidal is a 52 year old yo female who presents today for evaluation of Hashimoto's Hypothyroidism. Referred by PCP. She also has history of Factor XII deficiency taking vitamin K. Underwent hysterectomy 2018 for severe menorrhagia.    Chief Complaint   Patient presents with     Follow Up     Hypothyroidism, unspecified type +1 more       1) Hashimoto's Hypothyroidism  Diagnosis Found 20+ years ago (~1990) post pregnancy-- had two children back to back and after second child had significant fatigue, weight gain, menorrhagia. Found on testing after that-- started low dose levothyroxine without significant change in symptoms. She was managed by multiple endocrinologists over the years and mostly with PCP since 2015. Diagnosed with \"Hashimoto's\" although unclear if antibody testing was ever completed. stopped levothyroxine 25mcg in Feb 2019, no difference on/off thyroid     Treatment currently none-  Levothyroxine 25mcg    No repeat labs since last visit  Persistent fatigue  Continued weight gain, approx 15 lbs since last visit  Denies cold intolerance, diarrhea, other changes in hair/nails  Denies any difficulty breathing, difficulty swallowing, shortness of breath, enlargement of neck/thyroid, family history of thyroid cancer, exposure to radiation, hoarseness or weight loss.   No previous thyroid imaging      Wt Readings from Last 10 Encounters:   02/15/23 90.5 kg (199 lb 8 oz)   09/14/22 83.7 kg (184 lb 8 oz)   05/11/22 84.7 kg (186 lb 12.8 oz)   04/22/22 86.6 kg (191 lb)   01/31/19 80.7 kg (178 lb)   12/10/18 79.5 kg (175 lb 3.2 oz)   11/23/18 83.5 kg (184 lb)   11/20/18 80.3 kg (177 lb)   11/12/18 81.2 kg (179 lb)   10/23/18 81.4 kg (179 lb 8 oz) "       2) Weight Hx:   - Noting over past 2-3 years, gaining more weight, insidiously, difficult to exercise given fibromas in base of feet (only walk about a mile)  -  Motivation for losing weight?:    -  Lifetime maximum weight?: ~200lbs  -  Any issues with weight as a child? none  -  Weight at high school graduation OR age 18 yrs?:  120  -  Lowest / healthiest weight maintained as an adult?: 120  --  Medications or medical problems contributing to gain?      [ ] steroid use      [ ] birth control / Depo-provera      [ ] fluid retention / edema      [X ] anti-depressant / psych medication      [ ] other __________________  -  Previous attempts at weight loss?        [ ] dieting        [ ] exercise   -- Gym, orange theory      [ ]  / RD       [X ] meal replacement program (Nutrisystem, etc)      [ ] formal WL program (Cady Snowden, weight watchers, etc)       [ ] cleanse/fasting        [ ] other  _____________  -  Previous use of OTC or prescription weight loss aid?      [ ] herbal supplements      [ ] B12 injections      [X ] stimulant appetite suppressants (phentermine, diethylpropion, etc)-- PhenPhen      [ ] Belviq        [ ] other (Contrave, orlistat, etc)  ______________   -  Bariatric surgery?      [ ]  Considering or currently enrolled in program      [ ]  Previously enrolled in program      [ ]  Band / sleeve / RYGBS      [ ]  Other  ____________________      24hr recall:  Lunch- brisket, mac and cheese, greens  Dinner- chicken, mashed potatoes, wine x2,   Beverages- milk, water  Snacks- none    Heavy protein/greens for lunch, mostly potatoes with dinner (1/2 chicken breast), cream sauce      Wt Readings from Last 5 Encounters:   02/15/23 90.5 kg (199 lb 8 oz)   09/14/22 83.7 kg (184 lb 8 oz)   05/11/22 84.7 kg (186 lb 12.8 oz)   04/22/22 86.6 kg (191 lb)   01/31/19 80.7 kg (178 lb)       Active diagnoses this visit:     Hypothyroidism, unspecified type  Class 2 obesity with body mass index (BMI) of  36.0 to 36.9 in adult, unspecified obesity type, unspecified whether serious comorbidity present     ROS: 10 point ROS neg other than the symptoms noted above in the HPI.      Medical, surgical, social, and family histories, medications and allergies reviewed and updated.  Past Medical History:   Diagnosis Date     Allergic rhinitis, cause unspecified      Bleeding disorder (H)     factor 12     CARDIOVASCULAR SCREENING; LDL GOAL LESS THAN 160 10/31/2010     Hashimoto's thyroiditis      Lateral epicondylitis 3/13/2009     Migraine headache 2/19/2013     NONSPECIFIC MEDICAL HISTORY 2002    Factor XII deficiency (asymptomatic)     NONSPECIFIC MEDICAL HISTORY 2002    GRAYSON positive 1:320, seen by Rheum (negative PHUONG eval)       Past Surgical History:   Procedure Laterality Date     ABDOMEN SURGERY      10/2007     APPENDECTOMY      10/2007     DAVINCI HERNIORRHAPHY VENTRAL N/A 8/15/2017    Procedure: DAVINCI HERNIORRHAPHY VENTRAL;  Robotic assisted incisional and supra-umbilical  hernia repair with mesh;  Surgeon: Damon Hall MD;  Location: [facility] OR     DILATION AND CURETTAGE, HYSTEROSCOPY DIAGNOSTIC, COMBINED N/A 11/3/2017    Procedure: COMBINED DILATION AND CURETTAGE, HYSTEROSCOPY DIAGNOSTIC;  Exam Under Anesthesia, Diagnosistic Hysteroscopy, dilation and curettage, diagnostic laparoscopy and lysis of adhesions;  Surgeon: Neptali Fournier MD;  Location: [facility] OR     LAPAROSCOPIC HYSTERECTOMY TOTAL, SALPINGECTOMY BILATERAL Bilateral 11/20/2018    Procedure: total laparoscopic hysterectomy, bilateral salpingectomy;  Surgeon: Neptali Fournier MD;  Location: [facility] OR     LAPAROSCOPY DIAGNOSTIC (GYN) N/A 11/3/2017    Procedure: LAPAROSCOPY DIAGNOSTIC (GYN);  diagnostic laparoscopy, lysis of adhesions;  Surgeon: Neptali Fournier MD;  Location: [facility] OR     ZZC NONSPECIFIC PROCEDURE      wisdom teeth extraction     [facility] NONSPECIFIC PROCEDURE      Bilateral tubal ligation     [facility] NONSPECIFIC PROCEDURE  1/01    Lasik     [facility] NONSPECIFIC  "PROCEDURE      explo lap for ARMANDO for SBO       Allergies:  No known drug allergies    Social History     Tobacco Use     Smoking status: Never     Smokeless tobacco: Never   Substance Use Topics     Alcohol use: Yes     Alcohol/week: 0.0 standard drinks     Comment: occasionally       Family History   Problem Relation Age of Onset     Diabetes Father         type 2     Coronary Artery Disease Mother      Diabetes Maternal Grandmother          Objective:  /79 (BP Location: Left arm, Patient Position: Sitting, Cuff Size: Adult Large)   Pulse 69   Ht 1.575 m (5' 2\")   Wt 90.5 kg (199 lb 8 oz)   LMP 10/31/2018 (Exact Date)   SpO2 96%   BMI 36.49 kg/m      Exam:  Constitutional: healthy, alert, no acute distress  Head: Normocephalic. No masses, lesions, no exophthalmos/proptosis  ENT: normal thyroid, no palpable nodules, no cervical lymph nodes  Respiratory: nonlabored  Gastrointestinal: Abdomen soft, non-tender.  Musculoskeletal: extremities normal- no gross deformities noted, gait normal and normal muscle tone  Skin: no suspicious lesions or rashes  Neurologic: Gait normal. sensation grossly intact  Psychiatric: mentation appears normal, calm        Lab Results   Component Value Date/Time    TSH 2.17 09/30/2022 08:40 AM    TSH 3.06 04/17/2018 07:55 AM    T4 0.89 09/30/2022 08:40 AM    T4 1.31 04/20/2004 04:07 PM     Last Comprehensive Metabolic Panel:  Sodium   Date Value Ref Range Status   04/22/2022 139 133 - 144 mmol/L Final   04/17/2018 141 133 - 144 mmol/L Final     Potassium   Date Value Ref Range Status   04/22/2022 4.5 3.4 - 5.3 mmol/L Final   04/17/2018 4.3 3.4 - 5.3 mmol/L Final     Chloride   Date Value Ref Range Status   04/22/2022 106 94 - 109 mmol/L Final   04/17/2018 109 94 - 109 mmol/L Final     Carbon Dioxide   Date Value Ref Range Status   04/17/2018 25 20 - 32 mmol/L Final     Carbon Dioxide (CO2)   Date Value Ref Range Status   04/22/2022 27 20 - 32 mmol/L Final     Anion Gap "   Date Value Ref Range Status   04/22/2022 6 3 - 14 mmol/L Final   04/17/2018 7 3 - 14 mmol/L Final     Glucose   Date Value Ref Range Status   04/22/2022 90 70 - 99 mg/dL Final   11/21/2018 89 70 - 99 mg/dL Final     Urea Nitrogen   Date Value Ref Range Status   04/22/2022 16 7 - 30 mg/dL Final   04/17/2018 14 7 - 30 mg/dL Final     Creatinine   Date Value Ref Range Status   04/22/2022 0.88 0.52 - 1.04 mg/dL Final   11/21/2018 0.80 0.52 - 1.04 mg/dL Final     GFR Estimate   Date Value Ref Range Status   04/22/2022 79 >60 mL/min/1.73m2 Final     Comment:     Effective December 21, 2021 eGFRcr in adults is calculated using the 2021 CKD-EPI creatinine equation which includes age and gender (Priscilla et al., NEJ, DOI: 10.1056/HHOFzg9977843)   11/21/2018 76 >60 mL/min/1.7m2 Final     Comment:     Non  GFR Calc     Calcium   Date Value Ref Range Status   04/22/2022 9.0 8.5 - 10.1 mg/dL Final   04/17/2018 8.4 (L) 8.5 - 10.1 mg/dL Final           ASSESSMENT / PLAN:  (R53.83) Fatigue, unspecified type    1) Hypothyroidism, Hashimoto's--   - Will recheck TFTs now-- if similar to previous, will increase to LT4 50mcg.   - If improved/more in the normal range, no clinical difference so will stop and monitor annually.    2) Vitamin D Deficiency  -Vitamin D 1000u daily  - Recheck level    3) OBESITY-  Starting weight today  Body mass index is 36.49 kg/m . 199lbs, 90.5kg  1.   GOALS:  Target weight loss > 5% (10 lbs) over the next 3-4 months.  Long-term goal to maintain weight below  150  lbs  2.   DIET:  Portion controlled, carb-controlled diet,  ~ 1200 calories/day        >  Target intake 12-15 kcal/kg/day = 3106-4699   calories daily  3.   BEHAVIOR: Will work on cutting out sweets and sugary beverages, start keeping a food record using phone danielle or paper log, set goals, sleep hygiene  4.   MEDS:  Starting trial of GLP1-- will plan for insulin, glucose to determine insulin resistance  5.   SURG:  Patient not  quite at NIH criteria for WLS, but is not presently interested in this option-- will refer to weight management program for more comprehensive services  6.   ACTIVITY: 20-30min daily, as much as possible  7.   BARRIERS:  Sedentary,   8.   TEACHING:  Can return to see RD/CDE for further diet teaching as needed          Orders Placed This Encounter   Procedures     Insulin level     Hemoglobin A1c     Basic metabolic panel  (Ca, Cl, CO2, Creat, Gluc, K, Na, BUN)     TSH     T4, free     Vitamin D Deficiency     Comprehensive Weight Management         RTC 6 months    A total of 28 minutes were spent today 02/15/23 on this visit including chart review, history and counseling, documentation and other activities as detailed above.         Again, thank you for allowing me to participate in the care of your patient.        Sincerely,        Dorothea Zavala MD

## 2023-02-15 NOTE — PATIENT INSTRUCTIONS
Total daily calorie goal:   9346- 7945    - Start GLP1 agonist-- Ozempic 0.25 x4 weeks then increase to 0.5mg weekly   - Reviewed common side effects of nausea/bloating, slow food moving through stomach, cramping abdominal pain

## 2023-02-17 ENCOUNTER — TELEPHONE (OUTPATIENT)
Dept: ENDOCRINOLOGY | Facility: CLINIC | Age: 53
End: 2023-02-17
Payer: COMMERCIAL

## 2023-02-17 NOTE — TELEPHONE ENCOUNTER
[facility] Call Center    Phone Message    May a detailed message be left on voicemail: yes     Reason for Call: Medication Question or concern regarding medication   Prescription Clarification  Name of Medication: Ozempic  Prescribing Provider: Dr Zavala   Pharmacy: [facility] [place]   What on the order needs clarification? patient and provider discussed this medication at her last visit, patient would like a call when medication has been sent to pharmacy thank you          Action Taken: Other: endo    Travel Screening: Not Applicable

## 2023-02-20 DIAGNOSIS — E03.9 HYPOTHYROIDISM, UNSPECIFIED TYPE: Primary | ICD-10-CM

## 2023-02-21 DIAGNOSIS — E03.9 HYPOTHYROIDISM, UNSPECIFIED TYPE: ICD-10-CM

## 2023-02-21 RX ORDER — LEVOTHYROXINE SODIUM 50 UG/1
50 TABLET ORAL DAILY
Qty: 90 TABLET | Refills: 1 | Status: SHIPPED | OUTPATIENT
Start: 2023-02-21 | End: 2023-08-09

## 2023-02-21 NOTE — RESULT ENCOUNTER NOTE
Dear Sha,     Here are your recent results.     Thyroid level overall looks good-- if you want we can try to increase your dose to 50mcg daily and see if that helps you feel a bit better without pushing your levels hyperthyroid. I'll send a new prescription-- please recheck in 4-6 weeks.     ANA-IR is 1.2, normal insulin resistance. I didn't hear the Ozempic wasn't covered, so let me know.     Please let us know if you have any questions or concerns.    Regards,  Dorothea Zavala MD

## 2023-03-14 ENCOUNTER — OFFICE VISIT (OUTPATIENT)
Dept: URGENT CARE | Facility: URGENT CARE | Age: 53
End: 2023-03-14
Payer: COMMERCIAL

## 2023-03-14 VITALS
RESPIRATION RATE: 20 BRPM | TEMPERATURE: 98 F | DIASTOLIC BLOOD PRESSURE: 80 MMHG | SYSTOLIC BLOOD PRESSURE: 112 MMHG | HEART RATE: 72 BPM | OXYGEN SATURATION: 98 %

## 2023-03-14 DIAGNOSIS — R35.0 URINARY FREQUENCY: ICD-10-CM

## 2023-03-14 DIAGNOSIS — N76.0 BV (BACTERIAL VAGINOSIS): ICD-10-CM

## 2023-03-14 DIAGNOSIS — N30.01 ACUTE CYSTITIS WITH HEMATURIA: Primary | ICD-10-CM

## 2023-03-14 DIAGNOSIS — B96.89 BV (BACTERIAL VAGINOSIS): ICD-10-CM

## 2023-03-14 LAB
ALBUMIN UR-MCNC: NEGATIVE MG/DL
APPEARANCE UR: CLEAR
BACTERIA #/AREA URNS HPF: ABNORMAL /HPF
BILIRUB UR QL STRIP: NEGATIVE
CLUE CELLS: PRESENT
COLOR UR AUTO: YELLOW
GLUCOSE UR STRIP-MCNC: NEGATIVE MG/DL
HGB UR QL STRIP: ABNORMAL
KETONES UR STRIP-MCNC: NEGATIVE MG/DL
LEUKOCYTE ESTERASE UR QL STRIP: ABNORMAL
NITRATE UR QL: NEGATIVE
PH UR STRIP: 5 [PH] (ref 5–7)
RBC #/AREA URNS AUTO: ABNORMAL /HPF
SP GR UR STRIP: 1.02 (ref 1–1.03)
SQUAMOUS #/AREA URNS AUTO: ABNORMAL /LPF
TRICHOMONAS, WET PREP: ABNORMAL
UROBILINOGEN UR STRIP-ACNC: 0.2 E.U./DL
WBC #/AREA URNS AUTO: ABNORMAL /HPF
WBC'S/HIGH POWER FIELD, WET PREP: ABNORMAL
YEAST, WET PREP: ABNORMAL

## 2023-03-14 PROCEDURE — 87210 SMEAR WET MOUNT SALINE/INK: CPT | Performed by: STUDENT IN AN ORGANIZED HEALTH CARE EDUCATION/TRAINING PROGRAM

## 2023-03-14 PROCEDURE — 81001 URINALYSIS AUTO W/SCOPE: CPT | Performed by: STUDENT IN AN ORGANIZED HEALTH CARE EDUCATION/TRAINING PROGRAM

## 2023-03-14 PROCEDURE — 87186 SC STD MICRODIL/AGAR DIL: CPT | Performed by: STUDENT IN AN ORGANIZED HEALTH CARE EDUCATION/TRAINING PROGRAM

## 2023-03-14 PROCEDURE — 99213 OFFICE O/P EST LOW 20 MIN: CPT | Performed by: STUDENT IN AN ORGANIZED HEALTH CARE EDUCATION/TRAINING PROGRAM

## 2023-03-14 PROCEDURE — 87088 URINE BACTERIA CULTURE: CPT | Performed by: STUDENT IN AN ORGANIZED HEALTH CARE EDUCATION/TRAINING PROGRAM

## 2023-03-14 PROCEDURE — 87086 URINE CULTURE/COLONY COUNT: CPT | Performed by: STUDENT IN AN ORGANIZED HEALTH CARE EDUCATION/TRAINING PROGRAM

## 2023-03-14 RX ORDER — NITROFURANTOIN 25; 75 MG/1; MG/1
100 CAPSULE ORAL 2 TIMES DAILY
Qty: 10 CAPSULE | Refills: 0 | Status: SHIPPED | OUTPATIENT
Start: 2023-03-14 | End: 2023-03-19

## 2023-03-14 RX ORDER — METRONIDAZOLE 500 MG/1
500 TABLET ORAL 2 TIMES DAILY
Qty: 14 TABLET | Refills: 0 | Status: SHIPPED | OUTPATIENT
Start: 2023-03-14 | End: 2023-03-21

## 2023-03-14 NOTE — PROGRESS NOTES
ASSESSMENT & PLAN:   Diagnoses and all orders for this visit:  Acute cystitis with hematuria  -     nitroFURantoin macrocrystal-monohydrate (MACROBID) 100 MG capsule; Take 1 capsule (100 mg) by mouth 2 times daily for 5 days  Urinary frequency  -     UA Macroscopic with reflex to Microscopic and Culture  -     Wet preparation  -     Urine Microscopic Exam  -     Urine Culture  BV (bacterial vaginosis)  -     metroNIDAZOLE (FLAGYL) 500 MG tablet; Take 1 tablet (500 mg) by mouth 2 times daily for 7 days    UA positive for UTI, no signs of pyelonephritis -start Macrobid x5 days. Urine culture pending. Patient does not have symptoms of BV but wet prep positive for clue cells. Recommend treating UTI and if having continued symptoms, start metronidazole x7 days.    Return in about 1 week (around 3/21/2023) for follow-up with PCP if symptoms persist.    There are no Patient Instructions on file for this visit.    At the end of the encounter, I discussed results, diagnosis, medications. Discussed red flags for immediate return to clinic/ER, as well as indications for follow up if no improvement. Patient and/or caregiver understood and agreed to plan. Patient was stable for discharge.    ------------------------------------------------------------------------  SUBJECTIVE  Patient presents with:  UTI: Poss UTI pain when urine since Sunday     HPI  Sha Vidal is a(n) 53 year old female presenting to clinic today for UTI symptoms x2 days. Endorses dysuria and frequency. No hematuria, flank pain, fever, chills, nausea, vomiting. No vaginal pain, itching, changes in discharge, odor. No recent UTI.    Review of Systems    Current Outpatient Medications   Medication Sig Dispense Refill     citalopram (CELEXA) 10 MG tablet Take 1 tablet (10 mg) by mouth daily 30 tablet 11     levothyroxine (SYNTHROID/LEVOTHROID) 50 MCG tablet Take 1 tablet (50 mcg) by mouth daily 90 tablet 1     metroNIDAZOLE (FLAGYL) 500 MG tablet Take 1  tablet (500 mg) by mouth 2 times daily for 7 days 14 tablet 0     nitroFURantoin macrocrystal-monohydrate (MACROBID) 100 MG capsule Take 1 capsule (100 mg) by mouth 2 times daily for 5 days 10 capsule 0     semaglutide (OZEMPIC) 2 MG/1.5ML SOPN pen Inject 0.25 mg subcutaneous weekly x 4 weeks, then 0.5 mg weekly 3 mL 1     estradiol (ESTRACE) 0.1 MG/GM vaginal cream Apply a pea sized amount to the vagina daily for the first 14 days then twice weekly ongoing (Patient not taking: Reported on 2/15/2023) 42.5 g 3     Problem List:  2018-11: Post-operative state  2018-10: Bleeding disorder (H)  2018-04: Low hemoglobin  2018-04: Insomnia, unspecified type  2017-09: Pain in both feet  2017-09: Abnormal uterine bleeding  2017-07: Vitiligo  2017-07: Supraumbilical hernia  2017-07: Uterine leiomyoma, unspecified location  2017-07: Adjustment disorder with mixed anxiety and depressed mood  2016-07: Plantar fasciitis  2016-07: Plantar fascial fibromatosis of right foot  2016-07: Pes planus of both feet  2015-07: Overweight  2015-02: Hypothyroidism due to Hashimoto's thyroiditis  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     Results for orders placed or performed in visit on 03/14/23   UA Macroscopic with reflex to Microscopic and Culture     Status: Abnormal    Specimen: Urine, Clean Catch   Result Value Ref Range    Color Urine Yellow Colorless, Straw, Light Yellow, Yellow    Appearance Urine Clear Clear    Glucose Urine Negative Negative mg/dL    Bilirubin Urine Negative Negative     Ketones Urine Negative Negative mg/dL    Specific Gravity Urine 1.020 1.003 - 1.035    Blood Urine Trace (A) Negative    pH Urine 5.0 5.0 - 7.0    Protein Albumin Urine Negative Negative mg/dL    Urobilinogen Urine 0.2 0.2, 1.0 E.U./dL    Nitrite Urine Negative Negative    Leukocyte Esterase Urine Large (A) Negative   Urine Microscopic Exam     Status: Abnormal   Result Value Ref Range    Bacteria Urine Few (A) None Seen /HPF    RBC Urine 2-5 (A) 0-2 /HPF /HPF    WBC Urine 10-25 (A) 0-5 /HPF /HPF    Squamous Epithelials Urine Few (A) None Seen /LPF   Wet preparation     Status: Abnormal    Specimen: Vagina; Swab   Result Value Ref Range    Trichomonas Absent Absent    Yeast Absent Absent    Clue Cells Present (A) Absent    WBCs/high power field 2+ (A) None

## 2023-03-15 LAB — BACTERIA UR CULT: ABNORMAL

## 2023-03-16 DIAGNOSIS — N39.0 ACUTE UTI: Primary | ICD-10-CM

## 2023-03-16 RX ORDER — SULFAMETHOXAZOLE/TRIMETHOPRIM 800-160 MG
1 TABLET ORAL 2 TIMES DAILY
Qty: 6 TABLET | Refills: 0 | Status: SHIPPED | OUTPATIENT
Start: 2023-03-16 | End: 2023-03-19

## 2023-03-25 ENCOUNTER — HEALTH MAINTENANCE LETTER (OUTPATIENT)
Age: 53
End: 2023-03-25

## 2023-04-20 ENCOUNTER — PATIENT OUTREACH (OUTPATIENT)
Dept: CARE COORDINATION | Facility: CLINIC | Age: 53
End: 2023-04-20
Payer: COMMERCIAL

## 2023-05-23 ASSESSMENT — SLEEP AND FATIGUE QUESTIONNAIRES
HOW LIKELY ARE YOU TO NOD OFF OR FALL ASLEEP WHEN YOU ARE A PASSENGER IN A CAR FOR AN HOUR WITHOUT A BREAK: WOULD NEVER DOZE
HOW LIKELY ARE YOU TO NOD OFF OR FALL ASLEEP WHILE SITTING QUIETLY AFTER LUNCH WITHOUT ALCOHOL: WOULD NEVER DOZE
HOW LIKELY ARE YOU TO NOD OFF OR FALL ASLEEP IN A CAR, WHILE STOPPED FOR A FEW MINUTES IN TRAFFIC: WOULD NEVER DOZE
HOW LIKELY ARE YOU TO NOD OFF OR FALL ASLEEP WHILE LYING DOWN TO REST IN THE AFTERNOON WHEN CIRCUMSTANCES PERMIT: SLIGHT CHANCE OF DOZING
HOW LIKELY ARE YOU TO NOD OFF OR FALL ASLEEP WHILE SITTING AND READING: WOULD NEVER DOZE
HOW LIKELY ARE YOU TO NOD OFF OR FALL ASLEEP WHILE SITTING INACTIVE IN A PUBLIC PLACE: WOULD NEVER DOZE
HOW LIKELY ARE YOU TO NOD OFF OR FALL ASLEEP WHILE SITTING AND TALKING TO SOMEONE: WOULD NEVER DOZE
HOW LIKELY ARE YOU TO NOD OFF OR FALL ASLEEP WHILE WATCHING TV: SLIGHT CHANCE OF DOZING

## 2023-05-26 ENCOUNTER — OFFICE VISIT (OUTPATIENT)
Dept: SURGERY | Facility: CLINIC | Age: 53
End: 2023-05-26
Payer: COMMERCIAL

## 2023-05-26 VITALS
OXYGEN SATURATION: 98 % | DIASTOLIC BLOOD PRESSURE: 76 MMHG | BODY MASS INDEX: 32.94 KG/M2 | HEIGHT: 62 IN | WEIGHT: 179 LBS | SYSTOLIC BLOOD PRESSURE: 110 MMHG | HEART RATE: 80 BPM

## 2023-05-26 DIAGNOSIS — E66.09 CLASS 1 OBESITY DUE TO EXCESS CALORIES WITH SERIOUS COMORBIDITY AND BODY MASS INDEX (BMI) OF 32.0 TO 32.9 IN ADULT: Primary | ICD-10-CM

## 2023-05-26 DIAGNOSIS — E66.811 CLASS 1 OBESITY DUE TO EXCESS CALORIES WITH SERIOUS COMORBIDITY AND BODY MASS INDEX (BMI) OF 32.0 TO 32.9 IN ADULT: Primary | ICD-10-CM

## 2023-05-26 PROCEDURE — 99203 OFFICE O/P NEW LOW 30 MIN: CPT | Performed by: PHYSICIAN ASSISTANT

## 2023-05-26 NOTE — PROGRESS NOTES
"  New Medical Weight Management Consult        PATIENT:  Sha Vidal  MRN:         [id number]  :         1970  NICK:         2023          Dear Anita Quiros PA-C,    I had the pleasure of seeing your patient, Sha Vidal. Full intake/assessment was done to determine barriers to weight loss success and develop a treatment plan. Sha Vidal is a 53 year old female interested in treatment of medical problems associated with excess weight. She has a height of 5' 2\", a weight of 179 lbs 0 oz, and the calculated Body mass index is 32.74 kg/m .     Patient was started on Ozempic 0.5 mg 3 months ago by endocrinologist.  Has lost 20 lbs!  Reports no side effects and her appetite has gone away. Has been having 2 meals daily mainly because she is not hungry.       ASSESSMENT/PLAN:  Class 1 Obesity due to excess calories with Body Mass Index (BMI) of 32      Goals:  Twice per week cardio at least 20 minutes  Get lab   3 meals day   20 grams of protein     We discussed the role of pharmacological agents in the treatment of obesity and the \"off-label\" use of medications in this practice. We discussed the risks and benefits of each. We discussed indications, contraindications, potential side effects, and estimated costs of each. Discussed that medications must always be used together with lifestyle changes such as improvements in diet choices, portion control and establishing and maintaining a regular exercise program.     Congratulated patient on weight loss. RX for Ozempic 0.5 mg sent to pharmacy. If not covered Saxenda would be the next option.      She has the following co-morbidities:        2023     9:00 AM   --   I have the following health issues associated with obesity None of the above   I have the following symptoms associated with obesity Fatigue             2023     9:00 AM   Referring Provider   Please name the provider who referred you to Medical Weight Management  If you do " "not know, please answer \"I Don't Know\" Dr Zavala           5/23/2023     9:00 AM   Weight History   How concerned are you about your weight? Very Concerned   I became overweight After Pregnancy   The following factors have contributed to my weight gain Eating Wrong Types of Food    Lack of Exercise    Stress   I have tried the following methods to lose weight Watching Portions or Calories    Exercise    Nutrisystem   My lowest weight since age 18 was 120   My highest weight since age 18 was 199   The most weight I have ever lost was (lbs) 50   I have the following family history of obesity/being overweight My mother is overweight    One or more of my siblings are overweight   How has your weight changed over the last year? Gained     Up at 5:30 am  9 am - everything bagel with cream cheese - water  7 pm had half a burger    Fluids: 46 oz water daily, 1 cup milk most days, 2-3 glasses of wine per week         5/23/2023     9:00 AM   Diet Recall Review with Patient   How many glasses of juice do you drink in a typical day? 0   How many of glasses of milk do you drink in a typical day? 1   If you do drink milk, what type? Skim   How many 8oz glasses of sugar containing drinks such as Tone-Aid/sweet tea do you drink in a day? 0   How many cans/bottles of sugar pop/soda/tea/sports drinks do you drink in a day? 0   How many cans/bottles of diet pop/soda/tea or sports drink do you drink in a day? 0   How often do you have a drink of alcohol? 2-4 Times a Month   If you do drink, how many drinks might you have in a day? 1 or 2           5/23/2023     9:00 AM   Eating Habits   Generally, my meals include foods like these bread, pasta, rice, potatoes, corn, crackers, sweet dessert, pop, or juice A Few Times a Week   Generally, my meals include foods like these fried meats, brats, burgers, french fries, pizza, cheese, chips, or ice cream Less Than Weekly   Eat fast food (like McDonalds, Burger Scotty, Taco Bell) Never   Eat at a " buffet or sit-down restaurant Less Than Weekly   Eat most of my meals in front of the TV or computer Less Than Weekly   Often skip meals, eat at random times, have no regular eating times Less Than Weekly   Rarely sit down for a meal but snack or graze throughout Never   Eat extra snacks between meals Never   Eat most of my food at the end of the day Never   Eat in the middle of the night or wake up at night to eat Never   Eat extra snacks to prevent or correct low blood sugar Never   Eat to prevent acid reflux or stomach pain Never   Worry about not having enough food to eat Never   I eat when I am depressed Less Than Weekly   I eat when I am stressed Less Than Weekly   I eat when I am bored Never   I eat when I am anxious Never   I eat when I am happy or as a reward Never   I feel hungry all the time even if I just have eaten Never   Feeling full is important to me Less Than Weekly   I finish all the food on my plate even if I am already full Never   I can't resist eating delicious food or walk past the good food/smell Never   I eat/snack without noticing that I am eating Never   I eat when I am preparing the meal Never   I eat more than usual when I see others eating Never   I have trouble not eating sweets, ice cream, cookies, or chips if they are around the house Never   I think about food all day Never   What foods, if any, do you crave? Chips/Crackers           5/23/2023     9:00 AM   Amount of Food   I feel out of control when eating Never   I eat a large amount of food, like a loaf of bread, a box of cookies, a pint/quart of ice cream, all at once Never   I eat a large amount of food even when I am not hungry Never   I eat rapidly Never   I eat alone because I feel embarrassed and do not want others to see how much I have eaten Never   I eat until I am uncomfortably full Never   I feel bad, disgusted, or guilty after I overeat Never           5/23/2023     9:00 AM   Activity/Exercise History   How much of a  typical 12 hour day do you spend sitting? Most of the Day   How much of a typical 12 hour day do you spend lying down? Less Than Half the Day   How much of a typical day do you spend walking/standing? Less Than Half the Day   How many hours (not including work) do you spend on the TV/Video Games/Computer/Tablet/Phone? 2-3 Hours   How many times a week are you active for the purpose of exercise? Once a Week   What keeps you from being more active? Lack of Time   How many total minutes do you spend doing some activity for the purpose of exercising when you exercise? Less Than 15 Minutes       PAST MEDICAL HISTORY:  Past Medical History:   Diagnosis Date     Allergic rhinitis, cause unspecified      Bleeding disorder (H)     factor 12     CARDIOVASCULAR SCREENING; LDL GOAL LESS THAN 160 10/31/2010     Hashimoto's thyroiditis      Lateral epicondylitis 3/13/2009     Migraine headache 2/19/2013     NONSPECIFIC MEDICAL HISTORY 2002    Factor XII deficiency (asymptomatic)     NONSPECIFIC MEDICAL HISTORY 2002    GRAYSON positive 1:320, seen by Rheum (negative PHUONG eval)           5/23/2023     9:00 AM   Work/Social History Reviewed With Patient   My employment status is Full-Time   My job    How much of your job is spent on the computer or phone? 100%   How many hours do you spend commuting to work daily? 1   What is your marital status? /In a Relationship   If in a relationship, is your significant other overweight? No   If you have children, are they overweight? Yes   Who do you live with?    Who does the food shopping? myself           5/23/2023     9:00 AM   Mental Health History Reviewed With Patient   Have you ever been physically or sexually abused? No   How often in the past 2 weeks have you felt little interest or pleasure in doing things? Not at all   Over the past 2 weeks how often have you felt down, depressed, or hopeless? Not at all           5/23/2023     9:00 AM   Sleep History  "Reviewed With Patient   How many hours do you sleep at night? 6        ROS 5/26/23  General  Fatigue: No  Sleep Quality: OK  Birth Control: partial hysterectomy   HEENT  Hx of glaucoma: No  Vision changes: No  Cardiovascular  Chest Pain with Exertion: No  Palpitations: No  Hx of heart disease: No  Hx of PVD No  Pulmonary  Shortness of breath at rest: No  Shortness of breath with exertion: yes  Snoring: No  Gastrointestinal  Heartburn: No  Abdominal pain: No  History of pancreatitis: No  Severe constipation: No  Hx of bowel obstruction: No  Gallbladder disease: No  Gastrourinary  History of kidney stones: No  Psychiatric  Moods Stable: Yes  History of drug abuse: No  No history of eating disorder  Endocrine  Polydipsia: No  Polyuria: No  Personal or family history of thyroid cancer: No  Neurologic:  Hx of seizures: No  Hx of paresthesias: No        MEDICATIONS:   Current Outpatient Medications   Medication Sig Dispense Refill     citalopram (CELEXA) 10 MG tablet Take 1 tablet (10 mg) by mouth daily 30 tablet 11     levothyroxine (SYNTHROID/LEVOTHROID) 50 MCG tablet Take 1 tablet (50 mcg) by mouth daily 90 tablet 1     semaglutide (OZEMPIC) 2 MG/1.5ML SOPN pen Inject 0.25 mg subcutaneous weekly x 4 weeks, then 0.5 mg weekly 3 mL 1     estradiol (ESTRACE) 0.1 MG/GM vaginal cream Apply a pea sized amount to the vagina daily for the first 14 days then twice weekly ongoing (Patient not taking: Reported on 2/15/2023) 42.5 g 3       ALLERGIES:   Allergies   Allergen Reactions     No Known Drug Allergy        PHYSICAL EXAM:  /76 (BP Location: Right arm, Patient Position: Chair, Cuff Size: Adult Large)   Pulse 80   Ht 5' 2\" (1.575 m)   Wt 179 lb (81.2 kg)   LMP 10/31/2018 (Exact Date)   SpO2 98%   BMI 32.74 kg/m    GENERAL: Healthy, alert and no distress  EYES: Eyes grossly normal to inspection.  No discharge or erythema, or obvious scleral/conjunctival abnormalities.  RESP: No audible wheeze, cough, or visible " cyanosis.  No visible retractions or increased work of breathing.    SKIN: Visible skin clear. No significant rash, abnormal pigmentation or lesions.  NEURO: Cranial nerves grossly intact.  Mentation and speech appropriate for age.  PSYCH: Mentation appears normal, affect normal/bright, judgement and insight intact, normal speech and appearance well-groomed.        Sincerely,    Linda Vila PA-C    35 minutes spent on the date of the encounter doing chart review, review of test results, patient visit and documentation

## 2023-05-26 NOTE — PATIENT INSTRUCTIONS
"Nice to talk with you today. Thank you for allowing me the privilege of caring for you. We hope we provided you with the excellent service you deserve.     To ensure the quality of our services you may receive a patient satisfaction survey from an independent monitoring company.  The greatest compliment you can give is \"Likely to Recommend\"    Below is our plan we discussed.-  DESTINY Mckeon      Please schedule a follow up with Linda Vila PA-C in 3 months.  If you need to reach me sooner you can do so by calling [phone].    Have a great day!         Goals:  Twice per week cardio at least 20 minutes  Get lab   3 meals day   20 grams of protein         Eat Better ? Move More ? Live Well    Eat 3 nutrient-rich meals each day    Don t skip meals--it will cause you to overeat later in the day!    Eating fiber (vegetables/fruits/whole grains) and protein with meals helps you stay full longer    Choose foods with less than 10 grams of sugar and 5 grams of fat per serving to prevent excess calories and weight re-gain  Eat around the same times each day to develop a routine eating schedule   Avoid snacking unless physically hungry.   Planned snacks: 1-2 times per day and no more than 150 calories    Eat protein first   Protein helps with healing, maintaining adequate muscle mass, reducing hunger and optimizing nutritional status   Aim for 60-80 grams of protein per day   Fill up on Fiber   Fiber comes from plants--fruits, veggies, whole grains, nuts/seeds and beans   Fiber is low in calories, high in phytonutrients and helps you stay full longer   Aim for 25-35 grams per day by eating fiber with meals and snacks  Eat [id number]   Take 20-30 minutes to eat each meal by taking small bites, chewing foods to applesauce consistency or 20-30 times before you swallow   Eating foods too fast can delay satiety/fullness signals and increase overeating   Slow down your eating by using toddler utensils, putting your fork/spoon " down between bites and not watching TV or emailing during meals!   Keep a Journal         Writing down what you eat, how you feel and when you are active helps you identify new changes to work on from week to week         Look for ways to cut 100 calories from your current diet 2-3 times per day  Drink 64 ounces of 0-Calorie drinks between meals   Water   Zero calorie Propel  or Vitamin Water     [employer]  Zero Calories   Crystal Light , Sugar-Free Tone-Aid , and other sugar-free lemonade or flavored dumont   Keep Caffeine to less than 300mg per day ie: 3-6oz cups coffee     Work up to 45-60 minutes of physical activity most days of the week   Helps with losing weight and prevent regaining those extra pounds!    Do a combo of cardio (walking/water exercises) and strength training (lifting weights/Vinyasa yoga)    Avoid Mindless Eating   Be present when you eat--take note of the smell, taste and quality of your food   Make a list of alternative activities you could do to prevent eating out of boredom/stress  Go for a walk, call a friend, chew gum, paint your nails, re-organize the garage, etc

## 2023-05-28 NOTE — PROGRESS NOTES
"Virtual Visit Details    Type of service:  Video Visit     Originating Location (pt. Location): Home  Distant Location (provider location):  Off-site  Platform used for Video Visit: [facility] WEIGHT LOSS INITIAL EVALUATION  DIAGNOSIS:  Class I Obesity    NUTRITION HISTORY:    Breakfast: skips breakfast most days one time per week-every thing bagel with cream cheese,fruit one time per week  Lunch: skips 1X per week or leftover from dinner or low calorie frozen entree @ noon  Dinner: protein, veggie, potatoes or rice @ 7 pm  Snacks: rare  Beverage choices: 46 oz water, 8 oz skim or 1% milk, protein drink, 3-4 glasses Wine per week/occasional cocktail  Dining out: one time per week-Sushi  Binge eating: denies  Emotional eating: denies  Night time eating: denies  Exercise: none-has foot pain (fallen arches, plantar fascitis)/ occasionally walks dogs  Additional Information: Patient has a thyroid disorder (Hashimoto dz) and struggled with her weight for several years. Has lost ~ 20 pounds since February since taking Ozempic. Ozempic has helped patient to reduce snacking. Patient shops for groceries and her and her spouse share the cooking.     MEDICATION FOR WEIGHT LOSS:  Ozempic (prescribed by endocrine MD)    ANTHROPOMETRICS:  Height: 62\"  Weight: 179 lb per 5/26/23 visit  BMI:32.74  kg/m2  NUTRITION DIAGNOSIS:   Overweight/Obese class I related to overeating and poor lifestyle habits as evidence by patient's subjective statements and  BMI of 32.74 kg/m2   NUTRITION INTERVENTIONS  Nutrition Prescription:  Recommend modified energy- nutrient intake  Implementation:  Nutrition Education (Content):    Discussed portion sizes/ plate guidelines    Reviewed healthy snacking and beverage choices    Provided: Tips for Weight Loss and Weight Management, Protein sources for Weight Loss, Plate guidelines      Nutrition Education (Application):     Patient to practice goals as stated below    Patient verbalizes understanding " of diet by wanting to be more intentional with exercise    Anticipate good compliance    Goals:  Add seated exercise (video) 3 per week  Eat breakfast daily (non- traditional breakfast-leftover from dinner) or have protein drink  Criteria for selecting a protein drink/8-12 ounces:  < 250 calories  15-30 grams protein  < 10 grams total fat  < 10 grams sugar  Reduce alcohol by 50%      FOLLOW UP AND MONITORING:    Other  - follow up in 4 weeks.     TIME SPENT WITH PATIENT:   23 minutes   Cullen Yang RD, LD  [facility]

## 2023-05-30 ENCOUNTER — VIRTUAL VISIT (OUTPATIENT)
Dept: SURGERY | Facility: CLINIC | Age: 53
End: 2023-05-30
Payer: COMMERCIAL

## 2023-05-30 DIAGNOSIS — E66.09 CLASS 1 OBESITY DUE TO EXCESS CALORIES WITH SERIOUS COMORBIDITY AND BODY MASS INDEX (BMI) OF 32.0 TO 32.9 IN ADULT: Primary | ICD-10-CM

## 2023-05-30 DIAGNOSIS — E66.811 CLASS 1 OBESITY DUE TO EXCESS CALORIES WITH SERIOUS COMORBIDITY AND BODY MASS INDEX (BMI) OF 32.0 TO 32.9 IN ADULT: Primary | ICD-10-CM

## 2023-05-30 PROCEDURE — 97802 MEDICAL NUTRITION INDIV IN: CPT | Mod: VID

## 2023-06-09 DIAGNOSIS — F43.21 ADJUSTMENT DISORDER WITH DEPRESSED MOOD: ICD-10-CM

## 2023-06-09 NOTE — TELEPHONE ENCOUNTER
"Pt filled out PHQ-9.    Routing refill request to provider for review/approval because:  Drug not on the [facility] refill protocol     SAHARA Rodrigues    Requested Prescriptions   Pending Prescriptions Disp Refills     citalopram (CELEXA) 10 MG tablet [Pharmacy Med Name: CITALOPRAM HBR 10 MG TABLET] 30 tablet 1     Sig: TAKE 1 TABLET BY MOUTH DAILY       SSRIs Protocol Failed - 6/9/2023  9:05 AM        Failed - PHQ-9 score less than 5 in past 6 months     Please review last PHQ-9 score.           Failed - Recent (6 mo) or future (30 days) visit within the authorizing provider's specialty     Patient had office visit in the last 6 months or has a visit in the next 30 days with authorizing provider or within the authorizing provider's specialty.  See \"Patient Info\" tab in inbasket, or \"Choose Columns\" in Meds & Orders section of the refill encounter.            Passed - Medication is active on med list        Passed - Patient is age 18 or older        Passed - No active pregnancy on record        Passed - No positive pregnancy test in last 12 months               "

## 2023-06-09 NOTE — TELEPHONE ENCOUNTER
"Shania msg sent to pt with attached PHQ-9. Will check back to see if pt filled out.    Lilia RN    Requested Prescriptions   Pending Prescriptions Disp Refills     citalopram (CELEXA) 10 MG tablet [Pharmacy Med Name: CITALOPRAM HBR 10 MG TABLET] 30 tablet 1     Sig: TAKE 1 TABLET BY MOUTH DAILY       SSRIs Protocol Failed - 6/9/2023  9:05 AM        Failed - PHQ-9 score less than 5 in past 6 months     Please review last PHQ-9 score.           Failed - Recent (6 mo) or future (30 days) visit within the authorizing provider's specialty     Patient had office visit in the last 6 months or has a visit in the next 30 days with authorizing provider or within the authorizing provider's specialty.  See \"Patient Info\" tab in inbasket, or \"Choose Columns\" in Meds & Orders section of the refill encounter.            Passed - Medication is active on med list        Passed - Patient is age 18 or older        Passed - No active pregnancy on record        Passed - No positive pregnancy test in last 12 months           "

## 2023-06-10 ENCOUNTER — HEALTH MAINTENANCE LETTER (OUTPATIENT)
Age: 53
End: 2023-06-10

## 2023-06-20 RX ORDER — CITALOPRAM HYDROBROMIDE 10 MG/1
TABLET ORAL
Qty: 30 TABLET | Refills: 1 | Status: SHIPPED | OUTPATIENT
Start: 2023-06-20

## 2023-06-20 NOTE — TELEPHONE ENCOUNTER
rx approved.     Pt has an appt with her PCP on 8/2/23 and will request future refills from that provider.     Snehal STEPHENS RN

## 2023-08-09 ENCOUNTER — OFFICE VISIT (OUTPATIENT)
Dept: ENDOCRINOLOGY | Facility: CLINIC | Age: 53
End: 2023-08-09
Payer: COMMERCIAL

## 2023-08-09 VITALS
BODY MASS INDEX: 30.36 KG/M2 | SYSTOLIC BLOOD PRESSURE: 103 MMHG | OXYGEN SATURATION: 97 % | HEART RATE: 71 BPM | WEIGHT: 166 LBS | DIASTOLIC BLOOD PRESSURE: 72 MMHG

## 2023-08-09 DIAGNOSIS — E66.09 CLASS 1 OBESITY DUE TO EXCESS CALORIES WITH SERIOUS COMORBIDITY AND BODY MASS INDEX (BMI) OF 32.0 TO 32.9 IN ADULT: ICD-10-CM

## 2023-08-09 DIAGNOSIS — E03.9 HYPOTHYROIDISM, UNSPECIFIED TYPE: ICD-10-CM

## 2023-08-09 DIAGNOSIS — E66.811 CLASS 1 OBESITY DUE TO EXCESS CALORIES WITH SERIOUS COMORBIDITY AND BODY MASS INDEX (BMI) OF 32.0 TO 32.9 IN ADULT: ICD-10-CM

## 2023-08-09 PROCEDURE — 99213 OFFICE O/P EST LOW 20 MIN: CPT | Performed by: INTERNAL MEDICINE

## 2023-08-09 RX ORDER — LEVOTHYROXINE SODIUM 50 UG/1
50 TABLET ORAL DAILY
Qty: 90 TABLET | Refills: 1 | Status: SHIPPED | OUTPATIENT
Start: 2023-08-09 | End: 2024-02-20

## 2023-08-09 ASSESSMENT — ENCOUNTER SYMPTOMS
ARTHRALGIAS: 0
BRUISES/BLEEDS EASILY: 1
MUSCLE CRAMPS: 0
MYALGIAS: 1
SWOLLEN GLANDS: 0
BACK PAIN: 1
MUSCLE WEAKNESS: 0
NECK PAIN: 0
STIFFNESS: 0
JOINT SWELLING: 0

## 2023-08-09 NOTE — LETTER
"    8/9/2023         RE: Sha Vidal  [address]  [facility] [id number]        Dear Colleague,    Thank you for referring your patient, Sha Vidal, to the [facility] [place]. Please see a copy of my visit note below.    Sha Vidal is a 53 year old yo female who presents today for follow-up of Hashimoto's Hypothyroidism. Referred by PCP. She also has history of Factor XII deficiency taking vitamin K. Underwent hysterectomy 2018 for severe menorrhagia.    Chief Complaint   Patient presents with     RECHECK     Hypothyroidism, unspecified type +1 more       1) Hashimoto's Hypothyroidism  Diagnosis Found 20+ years ago (~1990) post pregnancy-- had two children back to back and after second child had significant fatigue, weight gain, menorrhagia. Found on testing after that-- started low dose levothyroxine without significant change in symptoms. She was managed by multiple endocrinologists over the years and mostly with PCP since 2015. Diagnosed with \"Hashimoto's\" although unclear if antibody testing was ever completed. stopped levothyroxine 25mcg in Feb 2019, no difference on/off thyroid     Treatment currently none-  Levothyroxine 25mcg    Denies signs/symptoms of hypo/hyperthyroidism including hot/cold intolerance, diarrhea/constipation, major changes in hair skin or nails, tremor, palpations.  Minimal, fluctuating fatiuge    Wt Readings from Last 10 Encounters:   08/09/23 75.3 kg (166 lb)   05/26/23 81.2 kg (179 lb)   02/15/23 90.5 kg (199 lb 8 oz)   09/14/22 83.7 kg (184 lb 8 oz)   05/11/22 84.7 kg (186 lb 12.8 oz)   04/22/22 86.6 kg (191 lb)   01/31/19 80.7 kg (178 lb)   12/10/18 79.5 kg (175 lb 3.2 oz)   11/23/18 83.5 kg (184 lb)   11/20/18 80.3 kg (177 lb)       2) Weight Hx:   - Noting over past 2-3 years, gaining more weight, insidiously, difficult to exercise given fibromas in base of feet (only walk about a mile)  -  Motivation for losing weight?:    -  Lifetime " maximum weight?: ~200lbs  -  Any issues with weight as a child? none  -  Weight at high school graduation OR age 18 yrs?:  120  -  Lowest / healthiest weight maintained as an adult?: 120  --  Medications or medical problems contributing to gain?      [ ] steroid use      [ ] birth control / Depo-provera      [ ] fluid retention / edema      [X ] anti-depressant / psych medication      [ ] other __________________  -  Previous attempts at weight loss?        [ ] dieting        [ ] exercise   -- Gym, orange theory      [ ]  / RD       [X ] meal replacement program (Nutrisystem, etc)      [ ] formal WL program ([employer], weight watchers, etc)       [ ] cleanse/fasting        [ ] other  _____________  -  Previous use of OTC or prescription weight loss aid?      [ ] herbal supplements      [ ] B12 injections      [X ] stimulant appetite suppressants (phentermine, diethylpropion, etc)-- PhenPhen      [ ] Belviq        [ ] other (Contrave, orlistat, etc)  ______________   -  Bariatric surgery?      [ ]  Considering or currently enrolled in program      [ ]  Previously enrolled in program      [ ]  Band / sleeve / RYGBS      [ ]  Other  ____________________      INTERVAL HISTORY:  - down 33lbs in 6 months, approx 16% body weight loss   - Minimal cravings, less EtOH  - Increased activity and moving soon (building house in [place]), finish in Nov  - Also meeting with weight management clinic-- I noted that after September may look to consolidate our care    Wt Readings from Last 5 Encounters:   08/09/23 75.3 kg (166 lb)   05/26/23 81.2 kg (179 lb)   02/15/23 90.5 kg (199 lb 8 oz)   09/14/22 83.7 kg (184 lb 8 oz)   05/11/22 84.7 kg (186 lb 12.8 oz)       Active diagnoses this visit:     Hypothyroidism, unspecified type  Class 1 obesity due to excess calories with serious comorbidity and body mass index (BMI) of 32.0 to 32.9 in adult     ROS: 10 point ROS neg other than the symptoms noted above in the  HPI.      Medical, surgical, social, and family histories, medications and allergies reviewed and updated.  Past Medical History:   Diagnosis Date     Allergic rhinitis, cause unspecified      Bleeding disorder (H)     factor 12     CARDIOVASCULAR SCREENING; LDL GOAL LESS THAN 160 10/31/2010     Hashimoto's thyroiditis      Lateral epicondylitis 3/13/2009     Migraine headache 2/19/2013     NONSPECIFIC MEDICAL HISTORY 2002    Factor XII deficiency (asymptomatic)     NONSPECIFIC MEDICAL HISTORY 2002    GRAYSON positive 1:320, seen by Rheum (negative PHUONG eval)       Past Surgical History:   Procedure Laterality Date     ABDOMEN SURGERY      10/2007     APPENDECTOMY      10/2007     DAVINCI HERNIORRHAPHY VENTRAL N/A 8/15/2017    Procedure: DAVINCI HERNIORRHAPHY VENTRAL;  Robotic assisted incisional and supra-umbilical  hernia repair with mesh;  Surgeon: Damon Hall MD;  Location: [facility]     DILATION AND CURETTAGE, HYSTEROSCOPY DIAGNOSTIC, COMBINED N/A 11/3/2017    Procedure: COMBINED DILATION AND CURETTAGE, HYSTEROSCOPY DIAGNOSTIC;  Exam Under Anesthesia, Diagnosistic Hysteroscopy, dilation and curettage, diagnostic laparoscopy and lysis of adhesions;  Surgeon: Neptali Fournier MD;  Location: [facility] OR     LAPAROSCOPIC HYSTERECTOMY TOTAL, SALPINGECTOMY BILATERAL Bilateral 11/20/2018    Procedure: total laparoscopic hysterectomy, bilateral salpingectomy;  Surgeon: Neptali Fournier MD;  Location: [facility] OR     LAPAROSCOPY DIAGNOSTIC (GYN) N/A 11/3/2017    Procedure: LAPAROSCOPY DIAGNOSTIC (GYN);  diagnostic laparoscopy, lysis of adhesions;  Surgeon: Neptali Fournier MD;  Location: [facility] OR     ZZC NONSPECIFIC PROCEDURE      wisdom teeth extraction     ZZC NONSPECIFIC PROCEDURE      Bilateral tubal ligation     ZZ NONSPECIFIC PROCEDURE  1/01    Lasik     ZZ NONSPECIFIC PROCEDURE      explo lap for ARMANDO for SBO       Allergies:  No known drug allergy    Social History     Tobacco Use     Smoking status: Never     Smokeless  tobacco: Never   Substance Use Topics     Alcohol use: Yes     Alcohol/week: 0.0 standard drinks of alcohol     Comment: occasionally       Family History   Problem Relation Age of Onset     Diabetes Father         type 2     Coronary Artery Disease Mother      Diabetes Maternal Grandmother          Objective:  /72 (BP Location: Right arm, Patient Position: Sitting, Cuff Size: Adult Regular)   Pulse 71   Wt 75.3 kg (166 lb)   LMP 10/31/2018 (Exact Date)   SpO2 97%   BMI 30.36 kg/m      Exam:  Constitutional: healthy, alert, no acute distress  Head: Normocephalic. No masses, lesions, no exophthalmos/proptosis  ENT: normal thyroid, no palpable nodules, no cervical lymph nodes  Respiratory: nonlabored  Gastrointestinal: Abdomen soft, non-tender.  Musculoskeletal: extremities normal- no gross deformities noted, gait normal and normal muscle tone  Skin: no suspicious lesions or rashes  Neurologic: Gait normal. sensation grossly intact  Psychiatric: mentation appears normal, calm        Lab Results   Component Value Date/Time    TSH 2.36 02/15/2023 08:14 AM    TSH 2.17 09/30/2022 08:40 AM    TSH 3.06 04/17/2018 07:55 AM    T4 1.05 02/15/2023 08:14 AM    T4 1.31 04/20/2004 04:07 PM     Last Comprehensive Metabolic Panel:  Sodium   Date Value Ref Range Status   02/15/2023 139 136 - 145 mmol/L Final   04/17/2018 141 133 - 144 mmol/L Final     Potassium   Date Value Ref Range Status   02/15/2023 4.4 3.4 - 5.3 mmol/L Final   04/22/2022 4.5 3.4 - 5.3 mmol/L Final   04/17/2018 4.3 3.4 - 5.3 mmol/L Final     Chloride   Date Value Ref Range Status   02/15/2023 102 98 - 107 mmol/L Final   04/22/2022 106 94 - 109 mmol/L Final   04/17/2018 109 94 - 109 mmol/L Final     Carbon Dioxide   Date Value Ref Range Status   04/17/2018 25 20 - 32 mmol/L Final     Carbon Dioxide (CO2)   Date Value Ref Range Status   02/15/2023 26 22 - 29 mmol/L Final   04/22/2022 27 20 - 32 mmol/L Final     Anion Gap   Date Value Ref Range Status    02/15/2023 11 7 - 15 mmol/L Final   04/22/2022 6 3 - 14 mmol/L Final   04/17/2018 7 3 - 14 mmol/L Final     Glucose   Date Value Ref Range Status   02/15/2023 99 70 - 99 mg/dL Final   04/22/2022 90 70 - 99 mg/dL Final   11/21/2018 89 70 - 99 mg/dL Final     Urea Nitrogen   Date Value Ref Range Status   02/15/2023 22.7 (H) 6.0 - 20.0 mg/dL Final   04/22/2022 16 7 - 30 mg/dL Final   04/17/2018 14 7 - 30 mg/dL Final     Creatinine   Date Value Ref Range Status   02/15/2023 0.81 0.51 - 0.95 mg/dL Final   11/21/2018 0.80 0.52 - 1.04 mg/dL Final     GFR Estimate   Date Value Ref Range Status   02/15/2023 86 >60 mL/min/1.73m2 Final     Comment:     eGFR calculated using 2021 CKD-EPI equation.   11/21/2018 76 >60 mL/min/1.7m2 Final     Comment:     Non  GFR Calc     Calcium   Date Value Ref Range Status   02/15/2023 9.5 8.6 - 10.0 mg/dL Final   04/17/2018 8.4 (L) 8.5 - 10.1 mg/dL Final           ASSESSMENT / PLAN:  (R53.83) Fatigue, unspecified type    1) Hypothyroidism, Hashimoto's--   - Continue levothyroxine 25mcg  - Recheck TFTs annually (has physical tomorrow so will order for tomorrow)    2) Vitamin D Deficiency  -Vitamin D 1000u daily  - Recheck level    3) OBESITY-  Starting weight at 199, weight today 166 (overall 16% weight loss)  Body mass index is 30.36 kg/m . 199lbs, 90.5kg  1.   GOALS:  Target weight loss > 5% (10 lbs) over the next 3-4 months.  Long-term goal to maintain weight below 140 : Ideal weight 101- 136 (goal- <140)  2.   DIET:  Portion controlled, carb-controlled diet,  ~ 1200 calories/day        >  Target intake 12-15 kcal/kg/day = 3539-5296   calories daily  3.   BEHAVIOR: Doing much better with calorie dense foods, better sleep hygiene  4.   MEDS:  continue semaglutide 0.5mg weekly, will notify me with plateau for dose change.  5.   SURG:  Patient not quite at NIH criteria for WLS, but is not presently interested in this option-- will refer to weight management program for  more comprehensive services  6.   ACTIVITY: 20-30min daily, as much as possible  7.   BARRIERS:  Sedentary,   8.   TEACHING:  Can return to see RD/CDE for further diet teaching as needed          Orders Placed This Encounter   Procedures     T4 free     TSH     Vitamin D Deficiency         RTC 6-8 months  A total of 20 minutes were spent today 08/09/23 on this visit including chart review, history and counseling, documentation and other activities as detailed above.   Answers submitted by the patient for this visit:  Symptoms you have experienced in the last 30 days (Submitted on 8/9/2023)  General Symptoms: No  Skin Symptoms: No  HENT Symptoms: No  EYE SYMPTOMS: No  HEART SYMPTOMS: No  LUNG SYMPTOMS: No  INTESTINAL SYMPTOMS: No  URINARY SYMPTOMS: No  GYNECOLOGIC SYMPTOMS: No  BREAST SYMPTOMS: No  SKELETAL SYMPTOMS: Yes  BLOOD SYMPTOMS: Yes  NERVOUS SYSTEM SYMPTOMS: No  MENTAL HEALTH SYMPTOMS: No  Please answer the questions below to tell us what condition you are experiencing: (Submitted on 8/9/2023)  Back pain: Yes  Muscle aches: Yes  Neck pain: No  Swollen joints: No  Joint pain: No  Bone pain: No  Muscle cramps: No  Muscle weakness: No  Joint stiffness: No  Bone fracture: No  Please answer the questions below to tell us what condition you are experiencing: (Submitted on 8/9/2023)  Anemia: No  Swollen glands: No  Easy bleeding or bruising: Yes  Edema or swelling: No      Again, thank you for allowing me to participate in the care of your patient.        Sincerely,        Dorothea Zavala MD

## 2023-08-09 NOTE — PROGRESS NOTES
"Sha Vidal is a 53 year old yo female who presents today for follow-up of Hashimoto's Hypothyroidism. Referred by PCP. She also has history of Factor XII deficiency taking vitamin K. Underwent hysterectomy 2018 for severe menorrhagia.    Chief Complaint   Patient presents with    RECHECK     Hypothyroidism, unspecified type +1 more       1) Hashimoto's Hypothyroidism  Diagnosis Found 20+ years ago (~1990) post pregnancy-- had two children back to back and after second child had significant fatigue, weight gain, menorrhagia. Found on testing after that-- started low dose levothyroxine without significant change in symptoms. She was managed by multiple endocrinologists over the years and mostly with PCP since 2015. Diagnosed with \"Hashimoto's\" although unclear if antibody testing was ever completed. stopped levothyroxine 25mcg in Feb 2019, no difference on/off thyroid     Treatment currently none-  Levothyroxine 25mcg    Denies signs/symptoms of hypo/hyperthyroidism including hot/cold intolerance, diarrhea/constipation, major changes in hair skin or nails, tremor, palpations.  Minimal, fluctuating fatiuge    Wt Readings from Last 10 Encounters:   08/09/23 75.3 kg (166 lb)   05/26/23 81.2 kg (179 lb)   02/15/23 90.5 kg (199 lb 8 oz)   09/14/22 83.7 kg (184 lb 8 oz)   05/11/22 84.7 kg (186 lb 12.8 oz)   04/22/22 86.6 kg (191 lb)   01/31/19 80.7 kg (178 lb)   12/10/18 79.5 kg (175 lb 3.2 oz)   11/23/18 83.5 kg (184 lb)   11/20/18 80.3 kg (177 lb)       2) Weight Hx:   - Noting over past 2-3 years, gaining more weight, insidiously, difficult to exercise given fibromas in base of feet (only walk about a mile)  -  Motivation for losing weight?:    -  Lifetime maximum weight?: ~200lbs  -  Any issues with weight as a child? none  -  Weight at high school graduation OR age 18 yrs?:  120  -  Lowest / healthiest weight maintained as an adult?: 120  --  Medications or medical problems contributing to gain?      [ ] steroid " use      [ ] birth control / Depo-provera      [ ] fluid retention / edema      [X ] anti-depressant / psych medication      [ ] other __________________  -  Previous attempts at weight loss?        [ ] dieting        [ ] exercise   -- Gym, orange theory      [ ]  / RD       [X ] meal replacement program ([employer], etc)      [ ] formal WL program ([employer], weight watchers, etc)       [ ] cleanse/fasting        [ ] other  _____________  -  Previous use of OTC or prescription weight loss aid?      [ ] herbal supplements      [ ] B12 injections      [X ] stimulant appetite suppressants (phentermine, diethylpropion, etc)-- PhenPhen      [ ] Belviq        [ ] other (Contrave, orlistat, etc)  ______________   -  Bariatric surgery?      [ ]  Considering or currently enrolled in program      [ ]  Previously enrolled in program      [ ]  Band / sleeve / RYGBS      [ ]  Other  ____________________      INTERVAL HISTORY:  - down 33lbs in 6 months, approx 16% body weight loss   - Minimal cravings, less EtOH  - Increased activity and moving soon (building house in [place]), finish in Nov  - Also meeting with weight management clinic-- I noted that after September may look to consolidate our care    Wt Readings from Last 5 Encounters:   08/09/23 75.3 kg (166 lb)   05/26/23 81.2 kg (179 lb)   02/15/23 90.5 kg (199 lb 8 oz)   09/14/22 83.7 kg (184 lb 8 oz)   05/11/22 84.7 kg (186 lb 12.8 oz)       Active diagnoses this visit:     Hypothyroidism, unspecified type  Class 1 obesity due to excess calories with serious comorbidity and body mass index (BMI) of 32.0 to 32.9 in adult     ROS: 10 point ROS neg other than the symptoms noted above in the HPI.      Medical, surgical, social, and family histories, medications and allergies reviewed and updated.  Past Medical History:   Diagnosis Date    Allergic rhinitis, cause unspecified     Bleeding disorder (H)     factor 12    CARDIOVASCULAR SCREENING; LDL GOAL LESS THAN 160  10/31/2010    Hashimoto's thyroiditis     Lateral epicondylitis 3/13/2009    Migraine headache 2/19/2013    NONSPECIFIC MEDICAL HISTORY 2002    Factor XII deficiency (asymptomatic)    NONSPECIFIC MEDICAL HISTORY 2002    GRAYSON positive 1:320, seen by Rheum (negative PHUONG eval)       Past Surgical History:   Procedure Laterality Date    ABDOMEN SURGERY      10/2007    APPENDECTOMY      10/2007    DAVINCI HERNIORRHAPHY VENTRAL N/A 8/15/2017    Procedure: DAVINCI HERNIORRHAPHY VENTRAL;  Robotic assisted incisional and supra-umbilical  hernia repair with mesh;  Surgeon: Damon Hall MD;  Location: [facility] OR    DILATION AND CURETTAGE, HYSTEROSCOPY DIAGNOSTIC, COMBINED N/A 11/3/2017    Procedure: COMBINED DILATION AND CURETTAGE, HYSTEROSCOPY DIAGNOSTIC;  Exam Under Anesthesia, Diagnosistic Hysteroscopy, dilation and curettage, diagnostic laparoscopy and lysis of adhesions;  Surgeon: Neptali Fournier MD;  Location: [facility]    LAPAROSCOPIC HYSTERECTOMY TOTAL, SALPINGECTOMY BILATERAL Bilateral 11/20/2018    Procedure: total laparoscopic hysterectomy, bilateral salpingectomy;  Surgeon: Neptali Fournier MD;  Location: [facility]    LAPAROSCOPY DIAGNOSTIC (GYN) N/A 11/3/2017    Procedure: LAPAROSCOPY DIAGNOSTIC (GYN);  diagnostic laparoscopy, lysis of adhesions;  Surgeon: Neptali Fournier MD;  Location: [facility] OR    ZZC NONSPECIFIC PROCEDURE      wisdom teeth extraction    ZZC NONSPECIFIC PROCEDURE      Bilateral tubal ligation    ZZC NONSPECIFIC PROCEDURE  1/01    Lasik    ZZC NONSPECIFIC PROCEDURE      explo lap for ARMANDO for SBO       Allergies:  No known drug allergy    Social History     Tobacco Use    Smoking status: Never    Smokeless tobacco: Never   Substance Use Topics    Alcohol use: Yes     Alcohol/week: 0.0 standard drinks of alcohol     Comment: occasionally       Family History   Problem Relation Age of Onset    Diabetes Father         type 2    Coronary Artery Disease Mother     Diabetes Maternal Grandmother           Objective:  /72 (BP Location: Right arm, Patient Position: Sitting, Cuff Size: Adult Regular)   Pulse 71   Wt 75.3 kg (166 lb)   LMP 10/31/2018 (Exact Date)   SpO2 97%   BMI 30.36 kg/m      Exam:  Constitutional: healthy, alert, no acute distress  Head: Normocephalic. No masses, lesions, no exophthalmos/proptosis  ENT: normal thyroid, no palpable nodules, no cervical lymph nodes  Respiratory: nonlabored  Gastrointestinal: Abdomen soft, non-tender.  Musculoskeletal: extremities normal- no gross deformities noted, gait normal and normal muscle tone  Skin: no suspicious lesions or rashes  Neurologic: Gait normal. sensation grossly intact  Psychiatric: mentation appears normal, calm        Lab Results   Component Value Date/Time    TSH 2.36 02/15/2023 08:14 AM    TSH 2.17 09/30/2022 08:40 AM    TSH 3.06 04/17/2018 07:55 AM    T4 1.05 02/15/2023 08:14 AM    T4 1.31 04/20/2004 04:07 PM     Last Comprehensive Metabolic Panel:  Sodium   Date Value Ref Range Status   02/15/2023 139 136 - 145 mmol/L Final   04/17/2018 141 133 - 144 mmol/L Final     Potassium   Date Value Ref Range Status   02/15/2023 4.4 3.4 - 5.3 mmol/L Final   04/22/2022 4.5 3.4 - 5.3 mmol/L Final   04/17/2018 4.3 3.4 - 5.3 mmol/L Final     Chloride   Date Value Ref Range Status   02/15/2023 102 98 - 107 mmol/L Final   04/22/2022 106 94 - 109 mmol/L Final   04/17/2018 109 94 - 109 mmol/L Final     Carbon Dioxide   Date Value Ref Range Status   04/17/2018 25 20 - 32 mmol/L Final     Carbon Dioxide (CO2)   Date Value Ref Range Status   02/15/2023 26 22 - 29 mmol/L Final   04/22/2022 27 20 - 32 mmol/L Final     Anion Gap   Date Value Ref Range Status   02/15/2023 11 7 - 15 mmol/L Final   04/22/2022 6 3 - 14 mmol/L Final   04/17/2018 7 3 - 14 mmol/L Final     Glucose   Date Value Ref Range Status   02/15/2023 99 70 - 99 mg/dL Final   04/22/2022 90 70 - 99 mg/dL Final   11/21/2018 89 70 - 99 mg/dL Final     Urea Nitrogen   Date Value Ref Range  Status   02/15/2023 22.7 (H) 6.0 - 20.0 mg/dL Final   04/22/2022 16 7 - 30 mg/dL Final   04/17/2018 14 7 - 30 mg/dL Final     Creatinine   Date Value Ref Range Status   02/15/2023 0.81 0.51 - 0.95 mg/dL Final   11/21/2018 0.80 0.52 - 1.04 mg/dL Final     GFR Estimate   Date Value Ref Range Status   02/15/2023 86 >60 mL/min/1.73m2 Final     Comment:     eGFR calculated using 2021 CKD-EPI equation.   11/21/2018 76 >60 mL/min/1.7m2 Final     Comment:     Non  GFR Calc     Calcium   Date Value Ref Range Status   02/15/2023 9.5 8.6 - 10.0 mg/dL Final   04/17/2018 8.4 (L) 8.5 - 10.1 mg/dL Final           ASSESSMENT / PLAN:  (R53.83) Fatigue, unspecified type    1) Hypothyroidism, Hashimoto's--   - Continue levothyroxine 25mcg  - Recheck TFTs annually (has physical tomorrow so will order for tomorrow)    2) Vitamin D Deficiency  -Vitamin D 1000u daily  - Recheck level    3) OBESITY-  Starting weight at 199, weight today 166 (overall 16% weight loss)  Body mass index is 30.36 kg/m . 199lbs, 90.5kg  1.   GOALS:  Target weight loss > 5% (10 lbs) over the next 3-4 months.  Long-term goal to maintain weight below 140 : Ideal weight 101- 136 (goal- <140)  2.   DIET:  Portion controlled, carb-controlled diet,  ~ 1200 calories/day        >  Target intake 12-15 kcal/kg/day = 4262-1470   calories daily  3.   BEHAVIOR: Doing much better with calorie dense foods, better sleep hygiene  4.   MEDS:  continue semaglutide 0.5mg weekly, will notify me with plateau for dose change.  5.   SURG:  Patient not quite at NIH criteria for WLS, but is not presently interested in this option-- will refer to weight management program for more comprehensive services  6.   ACTIVITY: 20-30min daily, as much as possible  7.   BARRIERS:  Sedentary,   8.   TEACHING:  Can return to see RD/CDE for further diet teaching as needed          Orders Placed This Encounter   Procedures    T4 free    TSH    Vitamin D Deficiency         RTC 6-8  months  A total of 20 minutes were spent today 08/09/23 on this visit including chart review, history and counseling, documentation and other activities as detailed above.   Answers submitted by the patient for this visit:  Symptoms you have experienced in the last 30 days (Submitted on 8/9/2023)  General Symptoms: No  Skin Symptoms: No  HENT Symptoms: No  EYE SYMPTOMS: No  HEART SYMPTOMS: No  LUNG SYMPTOMS: No  INTESTINAL SYMPTOMS: No  URINARY SYMPTOMS: No  GYNECOLOGIC SYMPTOMS: No  BREAST SYMPTOMS: No  SKELETAL SYMPTOMS: Yes  BLOOD SYMPTOMS: Yes  NERVOUS SYSTEM SYMPTOMS: No  MENTAL HEALTH SYMPTOMS: No  Please answer the questions below to tell us what condition you are experiencing: (Submitted on 8/9/2023)  Back pain: Yes  Muscle aches: Yes  Neck pain: No  Swollen joints: No  Joint pain: No  Bone pain: No  Muscle cramps: No  Muscle weakness: No  Joint stiffness: No  Bone fracture: No  Please answer the questions below to tell us what condition you are experiencing: (Submitted on 8/9/2023)  Anemia: No  Swollen glands: No  Easy bleeding or bruising: Yes  Edema or swelling: No

## 2023-08-10 ENCOUNTER — OFFICE VISIT (OUTPATIENT)
Dept: FAMILY MEDICINE | Facility: CLINIC | Age: 53
End: 2023-08-10
Payer: COMMERCIAL

## 2023-08-10 ENCOUNTER — TELEPHONE (OUTPATIENT)
Dept: ENDOCRINOLOGY | Facility: CLINIC | Age: 53
End: 2023-08-10
Payer: COMMERCIAL

## 2023-08-10 VITALS
BODY MASS INDEX: 29.29 KG/M2 | HEART RATE: 80 BPM | SYSTOLIC BLOOD PRESSURE: 110 MMHG | TEMPERATURE: 98.1 F | RESPIRATION RATE: 20 BRPM | WEIGHT: 165.3 LBS | OXYGEN SATURATION: 95 % | DIASTOLIC BLOOD PRESSURE: 70 MMHG | HEIGHT: 63 IN

## 2023-08-10 DIAGNOSIS — Z78.0 MENOPAUSE: ICD-10-CM

## 2023-08-10 DIAGNOSIS — Z00.00 ROUTINE GENERAL MEDICAL EXAMINATION AT A HEALTH CARE FACILITY: Primary | ICD-10-CM

## 2023-08-10 DIAGNOSIS — F43.21 ADJUSTMENT DISORDER WITH DEPRESSED MOOD: ICD-10-CM

## 2023-08-10 PROCEDURE — 99396 PREV VISIT EST AGE 40-64: CPT | Performed by: PHYSICIAN ASSISTANT

## 2023-08-10 PROCEDURE — 99213 OFFICE O/P EST LOW 20 MIN: CPT | Mod: 25 | Performed by: PHYSICIAN ASSISTANT

## 2023-08-10 RX ORDER — BUPROPION HYDROCHLORIDE 150 MG/1
150 TABLET ORAL EVERY MORNING
Qty: 90 TABLET | Refills: 1 | Status: SHIPPED | OUTPATIENT
Start: 2023-08-10 | End: 2024-02-20

## 2023-08-10 RX ORDER — CITALOPRAM HYDROBROMIDE 10 MG/1
10 TABLET ORAL DAILY
Qty: 30 TABLET | Refills: 1 | Status: CANCELLED | OUTPATIENT
Start: 2023-08-10

## 2023-08-10 ASSESSMENT — ENCOUNTER SYMPTOMS
SORE THROAT: 0
PALPITATIONS: 0
JOINT SWELLING: 0
HEMATURIA: 0
NAUSEA: 0
WEAKNESS: 0
HEMATOCHEZIA: 0
SHORTNESS OF BREATH: 0
CONSTIPATION: 0
PARESTHESIAS: 0
CHILLS: 0
EYE PAIN: 0
MYALGIAS: 0
DIARRHEA: 0
COUGH: 0
DYSURIA: 0
DIZZINESS: 0
HEARTBURN: 0
FREQUENCY: 0
ARTHRALGIAS: 0
NERVOUS/ANXIOUS: 0
ABDOMINAL PAIN: 0
HEADACHES: 0
BREAST MASS: 0
FEVER: 0

## 2023-08-10 ASSESSMENT — PAIN SCALES - GENERAL: PAINLEVEL: NO PAIN (0)

## 2023-08-10 NOTE — PROGRESS NOTES
SUBJECTIVE:   CC: Sha is an 53 year old who presents for preventive health visit.       8/10/2023     2:34 PM   Additional Questions   Roomed by Feng TRACY   Accompanied by N one         8/10/2023     2:34 PM   Patient Reported Additional Medications   Patient reports taking the following new medications None       Healthy Habits:     Getting at least 3 servings of Calcium per day:  Yes    Bi-annual eye exam:  Yes    Dental care twice a year:  Yes    Sleep apnea or symptoms of sleep apnea:  None    Diet:  Regular (no restrictions)    Frequency of exercise:  None    Taking medications regularly:  Yes    Medication side effects:  Not applicable and None    Additional concerns today:  No  Patient here for physical today.    Mood is being a bit affected by hormone and menopausal symptoms.  For weight and thyroid she see's Endo.  Just started GLP-1    Taking citalopram for mood.    The 10-year ASCVD risk score (Parish RUIZ, et al., 2019) is: 1.1%    Values used to calculate the score:      Age: 53 years      Sex: Female      Is Non- : No      Diabetic: No      Tobacco smoker: No      Systolic Blood Pressure: 110 mmHg      Is BP treated: No      HDL Cholesterol: 63 mg/dL      Total Cholesterol: 201 mg/dL    Today's PHQ-2 Score:       8/10/2023     2:20 PM   PHQ-2 ( 1999 [employer])   Q1: Little interest or pleasure in doing things 0   Q2: Feeling down, depressed or hopeless 0   PHQ-2 Score 0   Q1: Little interest or pleasure in doing things Not at all   Q2: Feeling down, depressed or hopeless Not at all   PHQ-2 Score 0       Have you ever done Advance Care Planning? (For example, a Health Directive, POLST, or a discussion with a medical provider or your loved ones about your wishes): No, advance care planning information given to patient to review.  Advanced care planning was discussed at today's visit.    Social History     Tobacco Use     Smoking status: Never     Smokeless tobacco: Never    Substance Use Topics     Alcohol use: Yes     Alcohol/week: 0.0 standard drinks of alcohol     Comment: occasionally             8/10/2023     2:13 PM   Alcohol Use   Prescreen: >3 drinks/day or >7 drinks/week? No     Reviewed orders with patient.  Reviewed health maintenance and updated orders accordingly - Yes      Breast Cancer Screenin/10/2023     2:20 PM   Breast CA Risk Assessment (FHS-7)   Do you have a family history of breast, colon, or ovarian cancer? No / Unknown         Mammogram Screening: Recommended annual mammography  Pertinent mammograms are reviewed under the imaging tab.    History of abnormal Pap smear: NO - hysterectomy      Latest Ref Rng & Units 2015     4:30 PM 2015    12:00 AM 2012     1:30 PM   PAP / HPV   PAP (Historical)   NIL  NIL    HPV 16 DNA NEG Negative      HPV 18 DNA NEG Negative      Other HR HPV NEG Negative        Reviewed and updated as needed this visit by clinical staff   Tobacco  Allergies  Meds              Reviewed and updated as needed this visit by Provider                     Review of Systems   Constitutional:  Negative for chills and fever.   HENT:  Negative for congestion, ear pain, hearing loss and sore throat.    Eyes:  Negative for pain and visual disturbance.   Respiratory:  Negative for cough and shortness of breath.    Cardiovascular:  Negative for chest pain, palpitations and peripheral edema.   Gastrointestinal:  Negative for abdominal pain, constipation, diarrhea, heartburn, hematochezia and nausea.   Breasts:  Negative for tenderness, breast mass and discharge.   Genitourinary:  Negative for dysuria, frequency, genital sores, hematuria, pelvic pain, urgency, vaginal bleeding and vaginal discharge.   Musculoskeletal:  Negative for arthralgias, joint swelling and myalgias.   Skin:  Negative for rash.   Neurological:  Negative for dizziness, weakness, headaches and paresthesias.   Psychiatric/Behavioral:  Negative for mood  "changes. The patient is not nervous/anxious.           OBJECTIVE:   /70 (BP Location: Right arm, Patient Position: Sitting, Cuff Size: Adult Regular)   Pulse 80   Temp 98.1  F (36.7  C) (Oral)   Resp 20   Ht 1.588 m (5' 2.5\")   Wt 75 kg (165 lb 4.8 oz)   LMP 10/31/2018 (Exact Date)   SpO2 95%   BMI 29.75 kg/m    Physical Exam  GENERAL: healthy, alert and no distress  EYES: Eyes grossly normal to inspection, PERRL and conjunctivae and sclerae normal  HENT: ear canals and TM's normal, nose and mouth without ulcers or lesions  NECK: no adenopathy, no asymmetry, masses, or scars and thyroid normal to palpation  RESP: lungs clear to auscultation - no rales, rhonchi or wheezes  CV: regular rate and rhythm, normal S1 S2, no S3 or S4, no murmur, click or rub, no peripheral edema and peripheral pulses strong  ABDOMEN: soft, nontender, no hepatosplenomegaly, no masses and bowel sounds normal  MS: no gross musculoskeletal defects noted, no edema  SKIN: no suspicious lesions or rashes  NEURO: Normal strength and tone, mentation intact and speech normal  PSYCH: mentation appears normal, affect normal/bright    Diagnostic Test Results:  Labs reviewed in Epic    ASSESSMENT/PLAN:   1. Routine general medical examination at a health care facility  Patient did not need labs today.  Declines screenings such as mammogram and colon cancer screening today.    2. Adjustment disorder with depressed mood  Trial for mood and menopausal symptoms.  Discussed possible side effects of anti-depressant medications.  Also discussed time to efficacy of about 6-8 weeks as well as expected duration of treatment.  Follow-up one month, sooner prn.    - buPROPion (WELLBUTRIN XL) 150 MG 24 hr tablet; Take 1 tablet (150 mg) by mouth every morning  Dispense: 90 tablet; Refill: 1    3. Menopause    - buPROPion (WELLBUTRIN XL) 150 MG 24 hr tablet; Take 1 tablet (150 mg) by mouth every morning  Dispense: 90 tablet; Refill: " "1          COUNSELING:  Reviewed preventive health counseling, as reflected in patient instructions       Regular exercise       Healthy diet/nutrition       Colorectal Cancer Screening       (Nadine)menopause management      BMI:   Estimated body mass index is 29.75 kg/m  as calculated from the following:    Height as of this encounter: 1.588 m (5' 2.5\").    Weight as of this encounter: 75 kg (165 lb 4.8 oz).   Weight management plan: Discussed healthy diet and exercise guidelines      She reports that she has never smoked. She has never used smokeless tobacco.          Ozzie Gordillo PA-C  [facility]  "

## 2023-09-11 ENCOUNTER — TELEPHONE (OUTPATIENT)
Dept: FAMILY MEDICINE | Facility: CLINIC | Age: 53
End: 2023-09-11

## 2023-09-11 NOTE — LETTER
[facility]  [address] [facility] [id number] [phone]       October 2, 2023    Sha Vidal  [address]  [facility] [address]    Dear Sha,    We care about your health and have reviewed your health plan and are making recommendations based on this review, to optimize your health.    You are in particular need of attention regarding:  -Breast Cancer Screening  -Colon Cancer Screening    We are recommending that you:  -schedule a MAMMOGRAM which is due.    1 in 8 women will develop invasive breast cancer during her lifetime and it is the most common non-skin cancer in American women.  EARLY detection, new treatments, and a better understanding of the disease have increased survival rates - the 5 year survival rate in the 1960s was 63% and today it is close to 90%.    If you are under/uninsured, we recommend you contact the [facility]. They offer mammograms at no charge or on a sliding fee charge. You can schedule with them at [phone]. Please have them send us the results.      Please disregard this reminder if you have had this exam elsewhere within the last year.  It would be helpful for us to have a copy of your mammogram report in your file so that we can best coordinate your care - please contact us with when your test was done so we can update your record.             -schedule a COLONOSCOPY to look for colon cancer (due every 10 years or 5 years in higher risk situations.)        Colon cancer is now the second leading cause of cancer-related deaths in the [place] for both men and women and there are over 130,000 new cases and 50,000 deaths per year from colon cancer.  Colonoscopies can prevent 90-95% of these deaths.  Problem lesions can be removed before they ever become cancer.  This test is not only looking for cancer, but also getting rid of precancerious lesions.    If you are under/uninsured, we recommend you contact the [facility]. [facility] is a free colorectal cancer screening program that provides colonoscopies for eligible under/uninsured [place] men and women. If you are interested in receiving a free colonoscopy, please call [employer] at [phone] (mention code [facility]) to see if you re eligible.      If you do not wish to do a colonoscopy or cannot afford to do one, at this time, there is another option. It is called a FIT test or Fecal Immunochemical Occult Blood Test (take home stool sample kit).  It does not replace the colonoscopy for colorectal cancer screening, but it can detect hidden bleeding in the lower colon.  It does need to be repeated every year and if a positive result is obtained, you would be referred for a colonoscopy.          If you have completed either one of these tests at another facility, please call with the details of when and where the tests were done and if they were normal or not. Or have the records sent to our clinic so that we can best coordinate your care.    In addition, here is a list of due or overdue Health Maintenance reminders.    Health Maintenance Due   Topic Date Due    Hepatitis B Vaccine (1 of 3 - 3-dose series) Never done    Colorectal Cancer Screening  Never done    Mammogram  08/05/2017    Zoster (Shingles) Vaccine (1 of 2) Never done    Flu Vaccine (1) 09/01/2023    COVID-19 Vaccine (3 - 2023-24 season) 09/01/2023       To address the above recommendations, we encourage you to contact us at [phone], via [employer] or by contacting [facility] toll free at [phone] 24 hours a day. They will assist you with finding the most convenient time and location.    Thank you for trusting [facility] and we appreciate the opportunity to serve you.  We look forward to supporting your healthcare needs in the future.    Healthy Regards,    Your [facility] Team

## 2023-09-11 NOTE — LETTER
[facility]  [address] [facility] [phone]       January 2, 2024    Sha Vidal  [address]  [facility] [address]    Dear Sha,    We care about your health and have reviewed your health plan and are making recommendations based on this review, to optimize your health.    You are in particular need of attention regarding:  -Breast Cancer Screening  -Colon Cancer Screening    We are recommending that you:  -schedule a MAMMOGRAM which is due.    1 in 8 women will develop invasive breast cancer during her lifetime and it is the most common non-skin cancer in American women.  EARLY detection, new treatments, and a better understanding of the disease have increased survival rates - the 5 year survival rate in the 1960s was 63% and today it is close to 90%.    If you are under/uninsured, we recommend you contact the [facility]. They offer mammograms at no charge or on a sliding fee charge. You can schedule with them at [phone]. Please have them send us the results.      Please disregard this reminder if you have had this exam elsewhere within the last year.  It would be helpful for us to have a copy of your mammogram report in your file so that we can best coordinate your care - please contact us with when your test was done so we can update your record.             -schedule a COLONOSCOPY to look for colon cancer (due every 10 years or 5 years in higher risk situations.)        Colon cancer is now the second leading cause of cancer-related deaths in the [place] for both men and women and there are over 130,000 new cases and 50,000 deaths per year from colon cancer.  Colonoscopies can prevent 90-95% of these deaths.  Problem lesions can be removed before they ever become cancer.  This test is not only looking for cancer, but also getting rid of precancerious lesions.    If you are under/uninsured, we recommend you contact the [facility] program. [facility] is a free colorectal cancer screening program that provides colonoscopies for eligible under/uninsured [place] men and women. If you are interested in receiving a free colonoscopy, please call [email or web address] at [phone] (mention code [facility]) to see if you re eligible.      If you do not wish to do a colonoscopy or cannot afford to do one, at this time, there is another option. It is called a FIT test or Fecal Immunochemical Occult Blood Test (take home stool sample kit).  It does not replace the colonoscopy for colorectal cancer screening, but it can detect hidden bleeding in the lower colon.  It does need to be repeated every year and if a positive result is obtained, you would be referred for a colonoscopy.          If you have completed either one of these tests at another facility, please call with the details of when and where the tests were done and if they were normal or not. Or have the records sent to our clinic so that we can best coordinate your care.    In addition, here is a list of due or overdue Health Maintenance reminders.    Health Maintenance Due   Topic Date Due    Hepatitis B Vaccine (1 of 3 - 3-dose series) Never done    Colorectal Cancer Screening  Never done    Mammogram  08/05/2017    Zoster (Shingles) Vaccine (1 of 2) Never done    Flu Vaccine (1) 09/01/2023    COVID-19 Vaccine (3 - 2023-24 season) 09/01/2023    PHQ-2 (once per calendar year)  01/01/2024       To address the above recommendations, we encourage you to contact us at [phone], via [employer] or by contacting Central Scheduling toll free at [phone] 24 hours a day. They will assist you with finding the most convenient time and location.    Thank you for trusting [facility] and we appreciate the opportunity to serve you.  We look forward to supporting your healthcare needs in the future.    Healthy Regards,    Your [facility]  Team

## 2023-09-11 NOTE — TELEPHONE ENCOUNTER
Patient Quality Outreach    Patient is due for the following:   Colon Cancer Screening  Breast Cancer Screening - Mammogram    Next Steps:   No follow up needed at this time.    Type of outreach:    Sent [employer] message.      Questions for provider review:    None           Maria R Brumfield MA

## 2023-09-11 NOTE — PROGRESS NOTES
Return Medical Weight Management Note         Sha Vidal  MRN:  [id number]  :  1970  NICK:  2023        Dear Anita Quiros PA-C,    I had the pleasure of seeing your patient Sha Vidal. She is a 53 year old female who I am continuing to see for treatment of obesity related to:        2023     9:00 AM   --   I have the following health issues associated with obesity None of the above   I have the following symptoms associated with obesity Fatigue         Assessment   Problem List Items Addressed This Visit       Migraine headache    Overweight with body mass index (BMI) of 29 to 29.9 in adult - Primary     Continue Ozempic 0.5 mg               PLAN/DISCUSSION:  Congratulated patient on overall weight loss.  Patient will continue with Ozempic 0.5 mg weekly. This is being prescribed by endocrinologist.        FOLLOW-UP:  3 months       SUBJECTIVE/OBJECTIVE:  Patient initially seen 2023. At that time had been started on Ozempic by endocrinology. Is injecting 0.5 mg on Saturday nights.  Chooses this night because gets a headache. Decreases appetite and has to remind self to eat. Is in the process of moving so doing a lot in preparation for this.      Goals:  Twice per week cardio at least 20 minutes - walks dogs  Get lab - not done  3 meals day - completed  20 grams of protein - completed    Anti-obesity medications:   Current: Ozempic 0.5 mg    Side effects: Denies nausea, vomiting, abdominal pain, severe constipation, diarrhea, or heartburn  Gets a headache that lasts about a day. Was getting nausea for a while but not anymore          2023     1:14 PM   Weight Loss Medication History Reviewed With Patient   Which weight loss medications are you currently taking on a regular basis? Ozempic   Are you having any side effects from the weight loss medication that we have prescribed you? Yes   If you are having side effects please describe: Headache after initial dose,  "usually goes away within a day         Recent diet changes: Breakfast something small. Today had yogurt. Lunch had 1 slice of pizza. Dinner: homemade chinese food  No snacking.  Gets in at least 60 oz water daily.  May have 1 alcoholic beverage daily    Recent exercise/activity changes: walking dog. Just sold house and is moving. Doing a lot of preparation for that.       CURRENT WEIGHT:   161 lbs 0 oz    Initial Weight (lbs): 179 lbs  Last Visits Weight: 179 lb (81.2 kg)  Cumulative weight loss (lbs): 18  Weight Loss Percentage: 10.06%        9/11/2023     1:14 PM   Changes and Difficulties   I have made the following changes to my diet since my last visit: Eating less, avoiding alchohol   With regards to my diet, I am still struggling with: nothing   I have made the following changes to my activity/exercise since my last visit: walking more   With regards to my activity/exercise, I am still struggling with: finding time to exercise         MEDICATIONS:   Current Outpatient Medications   Medication Sig Dispense Refill    buPROPion (WELLBUTRIN XL) 150 MG 24 hr tablet Take 1 tablet (150 mg) by mouth every morning 90 tablet 1    levothyroxine (SYNTHROID/LEVOTHROID) 50 MCG tablet Take 1 tablet (50 mcg) by mouth daily 90 tablet 1    semaglutide (OZEMPIC) 2 MG/3ML pen Inject 0.5 mg Subcutaneous every 7 days 9 mL 3    citalopram (CELEXA) 10 MG tablet TAKE 1 TABLET BY MOUTH DAILY (Patient not taking: Reported on 9/12/2023) 30 tablet 1         ROS:  General  Fatigue: No  Sleep Quality: OK      PHYSICAL EXAM:  Objective    /81 (BP Location: Right arm, Patient Position: Chair, Cuff Size: Adult Large)   Pulse 90   Ht 5' 2\" (1.575 m)   Wt 161 lb (73 kg)   LMP 10/31/2018 (Exact Date)   BMI 29.45 kg/m    GENERAL: Healthy, alert and no distress  EYES: Eyes grossly normal to inspection.  No discharge or erythema, or obvious scleral/conjunctival abnormalities.  RESP: No audible wheeze, cough, or visible cyanosis.  No " visible retractions or increased work of breathing.    SKIN: Visible skin clear. No significant rash, abnormal pigmentation or lesions.  NEURO: Cranial nerves grossly intact.  Mentation and speech appropriate for age.  PSYCH: Mentation appears normal, affect normal/bright, judgement and insight intact, normal speech and appearance well-groomed.        Sincerely,    Linda Vila PA-C    16 minutes spent on the date of the encounter doing chart review, review of test results, patient visit and documentation

## 2023-09-12 ENCOUNTER — OFFICE VISIT (OUTPATIENT)
Dept: SURGERY | Facility: CLINIC | Age: 53
End: 2023-09-12
Payer: COMMERCIAL

## 2023-09-12 VITALS
SYSTOLIC BLOOD PRESSURE: 110 MMHG | WEIGHT: 161 LBS | DIASTOLIC BLOOD PRESSURE: 81 MMHG | HEART RATE: 90 BPM | HEIGHT: 62 IN | BODY MASS INDEX: 29.63 KG/M2

## 2023-09-12 DIAGNOSIS — E66.3 OVERWEIGHT WITH BODY MASS INDEX (BMI) OF 29 TO 29.9 IN ADULT: Primary | ICD-10-CM

## 2023-09-12 DIAGNOSIS — G43.809 OTHER MIGRAINE WITHOUT STATUS MIGRAINOSUS, NOT INTRACTABLE: ICD-10-CM

## 2023-09-12 PROCEDURE — 99212 OFFICE O/P EST SF 10 MIN: CPT | Performed by: PHYSICIAN ASSISTANT

## 2023-09-12 NOTE — PATIENT INSTRUCTIONS
"Nice to talk with you today! Thank you for allowing me the privilege of caring for you.   We hope we provided you with the excellent service you deserve.     To ensure the quality of our services you may receive a patient satisfaction survey from an independent monitoring company.  The greatest compliment you can give is \"Likely to Recommend\"      Below is our plan we discussed.-  DESTINY Mckeon      Continue Ozempic 0.5 mg    Please call [phone] and schedule a follow up with Linda Vila PA-C in 3 months.  If you need to reach me sooner you can do so by calling [phone].    Have a great day!       Eat Better ? Move More ? Live Well    Eat 3 nutrient-rich meals each day    Don t skip meals--it will cause you to overeat later in the day!    Eating fiber (vegetables/fruits/whole grains) and protein with meals helps you stay full longer    Choose foods with less than 10 grams of sugar and 5 grams of fat per serving to prevent excess calories and weight re-gain  Eat around the same times each day to develop a routine eating schedule   Avoid snacking unless physically hungry.   Planned snacks: 1-2 times per day and no more than 150 calories    Eat protein first   Protein helps with healing, maintaining adequate muscle mass, reducing hunger and optimizing nutritional status   Aim for 60-80 grams of protein per day   Fill up on Fiber   Fiber comes from plants--fruits, veggies, whole grains, nuts/seeds and beans   Fiber is low in calories, high in phytonutrients and helps you stay full longer   Aim for 25-35 grams per day by eating fiber with meals and snacks  Eat [id number]   Take 20-30 minutes to eat each meal by taking small bites, chewing foods to applesauce consistency or 20-30 times before you swallow   Eating foods too fast can delay satiety/fullness signals and increase overeating   Slow down your eating by using toddler utensils, putting your fork/spoon down between bites and not watching TV or emailing " during meals!   Keep a Journal         Writing down what you eat, how you feel and when you are active helps you identify new changes to work on from week to week         Look for ways to cut 100 calories from your current diet 2-3 times per day  Drink 64 ounces of 0-Calorie drinks between meals   Water   Zero calorie Propel  or Vitamin Water     [employer]  Zero Calories   Crystal Light , Sugar-Free Tone-Aid , and other sugar-free lemonade or flavored dumont   Keep Caffeine to less than 300mg per day ie: 3-6oz cups coffee     Work up to 45-60 minutes of physical activity most days of the week   Helps with losing weight and prevent regaining those extra pounds!    Do a combo of cardio (walking/water exercises) and strength training (lifting weights/Vinyasa yoga)    Avoid Mindless Eating   Be present when you eat--take note of the smell, taste and quality of your food   Make a list of alternative activities you could do to prevent eating out of boredom/stress  Go for a walk, call a friend, chew gum, paint your nails, re-organize the garage, etc

## 2023-10-02 NOTE — TELEPHONE ENCOUNTER
Patient Quality Outreach    Patient is due for the following:   Colon Cancer Screening  Breast Cancer Screening - Mammogram      Topic Date Due    Hepatitis B Vaccine (1 of 3 - 3-dose series) Never done    Zoster (Shingles) Vaccine (1 of 2) Never done    Flu Vaccine (1) 09/01/2023    COVID-19 Vaccine (3 - 2023-24 season) 09/01/2023       Next Steps:   No follow up needed at this time.    Type of outreach:    Sent letter.    Next Steps:  Reach out within 90 days via Letter.    Max number of attempts reached: Yes. Will try again in 90 days if patient still on fail list.    Questions for provider review:    None           Maria R Brumfield MA

## 2024-01-02 NOTE — TELEPHONE ENCOUNTER
Patient Quality Outreach    Patient is due for the following:   Colon Cancer Screening  Breast Cancer Screening - Mammogram    Next Steps:   No follow up needed at this time.    Type of outreach:    Sent letter.    Next Steps:  Reach out within 90 days via Letter.    Max number of attempts reached: Yes. Will try again in 90 days if patient still on fail list.    Questions for provider review:    None           Maria R Brumfield MA

## 2024-01-03 ENCOUNTER — TELEPHONE (OUTPATIENT)
Dept: FAMILY MEDICINE | Facility: CLINIC | Age: 54
End: 2024-01-03

## 2024-01-03 DIAGNOSIS — F43.21 ADJUSTMENT DISORDER WITH DEPRESSED MOOD: ICD-10-CM

## 2024-01-03 DIAGNOSIS — Z78.0 MENOPAUSE: ICD-10-CM

## 2024-01-03 RX ORDER — BUPROPION HYDROCHLORIDE 150 MG/1
150 TABLET ORAL EVERY MORNING
Qty: 90 TABLET | Refills: 1 | OUTPATIENT
Start: 2024-01-03

## 2024-01-03 NOTE — LETTER
[facility]  [address] [facility] [address]  [phone]       July 8, 2024    Sha Vidal  [facility] [place] [address]    Dear Sha,    We care about your health and have reviewed your health plan and are making recommendations based on this review, to optimize your health.    You are in particular need of attention regarding:  -Breast Cancer Screening  -Colon Cancer Screening    We are recommending that you:  -schedule a MAMMOGRAM which is due.    1 in 8 women will develop invasive breast cancer during her lifetime and it is the most common non-skin cancer in American women.  EARLY detection, new treatments, and a better understanding of the disease have increased survival rates - the 5 year survival rate in the 1960s was 63% and today it is close to 90%.    If you are under/uninsured, we recommend you contact the [facility]. They offer mammograms at no charge or on a sliding fee charge. You can schedule with them at [phone]. Please have them send us the results.      Please disregard this reminder if you have had this exam elsewhere within the last year.  It would be helpful for us to have a copy of your mammogram report in your file so that we can best coordinate your care - please contact us with when your test was done so we can update your record.             -schedule a COLONOSCOPY to look for colon cancer (due every 10 years or 5 years in higher risk situations.)        Colon cancer is now the second leading cause of cancer-related deaths in the [place] for both men and women and there are over 130,000 new cases and 50,000 deaths per year from colon cancer.  Colonoscopies can prevent 90-95% of these deaths.  Problem lesions can be removed before they ever become cancer.  This test is not only looking for cancer, but also getting rid of precancerious lesions.    If you are under/uninsured, we recommend you contact the [facility]  program. [employer] is a free colorectal cancer screening program that provides colonoscopies for eligible under/uninsured [place] men and women. If you are interested in receiving a free colonoscopy, please call [employer] at [phone] (mention code [facility]) to see if you re eligible.      If you do not wish to do a colonoscopy or cannot afford to do one, at this time, there is another option. It is called a FIT test or Fecal Immunochemical Occult Blood Test (take home stool sample kit).  It does not replace the colonoscopy for colorectal cancer screening, but it can detect hidden bleeding in the lower colon.  It does need to be repeated every year and if a positive result is obtained, you would be referred for a colonoscopy.          If you have completed either one of these tests at another facility, please call with the details of when and where the tests were done and if they were normal or not. Or have the records sent to our clinic so that we can best coordinate your care.    In addition, here is a list of due or overdue Health Maintenance reminders.    Health Maintenance Due   Topic Date Due    Colorectal Cancer Screening  Never done    Hepatitis B Vaccine (1 of 3 - 19+ 3-dose series) Never done    Mammogram  08/05/2017    Zoster (Shingles) Vaccine (1 of 2) Never done    COVID-19 Vaccine (3 - 2023-24 season) 09/01/2023    PHQ-2 (once per calendar year)  01/01/2024    Thyroid Function Lab  02/15/2024       To address the above recommendations, we encourage you to contact us at [phone], via [employer] or by contacting Central Scheduling toll free at [phone] 24 hours a day. They will assist you with finding the most convenient time and location.    Thank you for trusting [facility] and we appreciate the opportunity to serve you.  We look forward to supporting your healthcare needs in the future.    Healthy Regards,    Your [facility]  [facility] Team

## 2024-01-03 NOTE — LETTER
[facility]  [address] [facility] [address]  [phone]       April 3, 2024    Sha Vidal  [facility] [place] [address]    Dear Sha,    We care about your health and have reviewed your health plan and are making recommendations based on this review, to optimize your health.    You are in particular need of attention regarding:  -Breast Cancer Screening  -Colon Cancer Screening    We are recommending that you:  -schedule a MAMMOGRAM which is due.    1 in 8 women will develop invasive breast cancer during her lifetime and it is the most common non-skin cancer in American women.  EARLY detection, new treatments, and a better understanding of the disease have increased survival rates - the 5 year survival rate in the 1960s was 63% and today it is close to 90%.    If you are under/uninsured, we recommend you contact the [facility] Program. They offer mammograms at no charge or on a sliding fee charge. You can schedule with them at [phone]. Please have them send us the results.      Please disregard this reminder if you have had this exam elsewhere within the last year.  It would be helpful for us to have a copy of your mammogram report in your file so that we can best coordinate your care - please contact us with when your test was done so we can update your record.             -schedule a COLONOSCOPY to look for colon cancer (due every 10 years or 5 years in higher risk situations.)        Colon cancer is now the second leading cause of cancer-related deaths in the [place] for both men and women and there are over 130,000 new cases and 50,000 deaths per year from colon cancer.  Colonoscopies can prevent 90-95% of these deaths.  Problem lesions can be removed before they ever become cancer.  This test is not only looking for cancer, but also getting rid of precancerious lesions.    If you are under/uninsured, we recommend you contact the [facility]  program. [employer] is a free colorectal cancer screening program that provides colonoscopies for eligible under/uninsured [place] men and women. If you are interested in receiving a free colonoscopy, please call [employer] at [phone] (mention code [facility]) to see if you re eligible.      If you do not wish to do a colonoscopy or cannot afford to do one, at this time, there is another option. It is called a FIT test or Fecal Immunochemical Occult Blood Test (take home stool sample kit).  It does not replace the colonoscopy for colorectal cancer screening, but it can detect hidden bleeding in the lower colon.  It does need to be repeated every year and if a positive result is obtained, you would be referred for a colonoscopy.          If you have completed either one of these tests at another facility, please call with the details of when and where the tests were done and if they were normal or not. Or have the records sent to our clinic so that we can best coordinate your care.    In addition, here is a list of due or overdue Health Maintenance reminders.    Health Maintenance Due   Topic Date Due    Colorectal Cancer Screening  Never done    Hepatitis B Vaccine (1 of 3 - 19+ 3-dose series) Never done    Mammogram  08/05/2017    Zoster (Shingles) Vaccine (1 of 2) Never done    Flu Vaccine (1) 09/01/2023    COVID-19 Vaccine (3 - 2023-24 season) 09/01/2023    PHQ-2 (once per calendar year)  01/01/2024    Thyroid Function Lab  02/15/2024       To address the above recommendations, we encourage you to contact us at [phone], via [employer] or by contacting Central Scheduling toll free at [phone] 24 hours a day. They will assist you with finding the most convenient time and location.    Thank you for trusting [facility] and we appreciate the opportunity to serve you.  We look forward to supporting your healthcare needs in the future.    Healthy Regards,    Your [facility]  [facility] Team

## 2024-01-11 ENCOUNTER — TELEPHONE (OUTPATIENT)
Dept: ENDOCRINOLOGY | Facility: CLINIC | Age: 54
End: 2024-01-11
Payer: COMMERCIAL

## 2024-01-11 NOTE — TELEPHONE ENCOUNTER
Prior Authorization Retail Medication Request    Medication/Dose: Ozempic 2mg/3ml pen  Diagnosis and ICD code (if different than what is on RX):    New/renewal/insurance change PA/secondary ins. PA:  Previously Tried and Failed:    Rationale:      [facility]  Primary:   Insurance ID:  [id number]     Secondary (if applicable):  Insurance ID:      Pharmacy Information (if different than what is on RX)  [facility]  Phone:  [phone]  Fax:[phone]

## 2024-01-29 ENCOUNTER — MYC MEDICAL ADVICE (OUTPATIENT)
Dept: ENDOCRINOLOGY | Facility: CLINIC | Age: 54
End: 2024-01-29
Payer: COMMERCIAL

## 2024-01-29 DIAGNOSIS — E66.812 CLASS 2 OBESITY WITH BODY MASS INDEX (BMI) OF 36.0 TO 36.9 IN ADULT, UNSPECIFIED OBESITY TYPE, UNSPECIFIED WHETHER SERIOUS COMORBIDITY PRESENT: Primary | ICD-10-CM

## 2024-01-31 DIAGNOSIS — E66.812 CLASS 2 OBESITY WITH BODY MASS INDEX (BMI) OF 36.0 TO 36.9 IN ADULT, UNSPECIFIED OBESITY TYPE, UNSPECIFIED WHETHER SERIOUS COMORBIDITY PRESENT: ICD-10-CM

## 2024-02-02 ENCOUNTER — TELEPHONE (OUTPATIENT)
Dept: ENDOCRINOLOGY | Facility: CLINIC | Age: 54
End: 2024-02-02
Payer: COMMERCIAL

## 2024-02-02 RX ORDER — LIRAGLUTIDE 6 MG/ML
INJECTION, SOLUTION SUBCUTANEOUS
Qty: 9 ML | Refills: 1 | Status: SHIPPED | OUTPATIENT
Start: 2024-02-02 | End: 2024-02-07

## 2024-02-02 NOTE — TELEPHONE ENCOUNTER
PA Initiation    Medication: SAXENDA 18 MG/3ML SC SOPN  Insurance Company: [facility] [employer] Non-Specialty PA's - Phone [phone] Fax [phone]  [facility] [id number] [place] [place] [facility] [address] [facility]   Filling Pharmacy Phone: [phone]  Filling Pharmacy Fax: [phone]  Start Date: 2/2/2024     Key: [id number]

## 2024-02-05 NOTE — TELEPHONE ENCOUNTER
Prior Authorization Approval    Medication: SAXENDA 18 MG/3ML SC SOPN  Authorization Effective Date: 2/4/2024  Authorization Expiration Date: 6/4/2024  Approved Dose/Quantity:    Reference #: Key: [id number]   Insurance Company: [facility] [employer] Non-Specialty PA's - Phone [phone] Fax [phone]  Expected CoPay: $ 30  CoPay Card Available:      Financial Assistance Needed:    [facility] the [facility] [id number] [place] [facility] [address] [facility]   Pharmacy Notified: Yes  Patient Notified: Yes

## 2024-02-07 ENCOUNTER — TELEPHONE (OUTPATIENT)
Dept: ENDOCRINOLOGY | Facility: CLINIC | Age: 54
End: 2024-02-07
Payer: COMMERCIAL

## 2024-02-07 DIAGNOSIS — E66.812 CLASS 2 OBESITY WITH BODY MASS INDEX (BMI) OF 36.0 TO 36.9 IN ADULT, UNSPECIFIED OBESITY TYPE, UNSPECIFIED WHETHER SERIOUS COMORBIDITY PRESENT: Primary | ICD-10-CM

## 2024-02-07 NOTE — TELEPHONE ENCOUNTER
PA Initiation    Medication: ZEPBOUND 2.5 MG/0.5ML SC SOAJ  Insurance Company: [employer] - Phone [phone] Fax [phone]  [facility] [id number] [place], [place] [facility] [address] [facility]   Filling Pharmacy Phone: [phone]  Filling Pharmacy Fax: [phone]  Start Date: 2/7/2024         Key: [id number]

## 2024-02-09 ENCOUNTER — MYC MEDICAL ADVICE (OUTPATIENT)
Dept: ENDOCRINOLOGY | Facility: CLINIC | Age: 54
End: 2024-02-09
Payer: COMMERCIAL

## 2024-02-09 DIAGNOSIS — E66.812 CLASS 2 OBESITY WITH BODY MASS INDEX (BMI) OF 36.0 TO 36.9 IN ADULT, UNSPECIFIED OBESITY TYPE, UNSPECIFIED WHETHER SERIOUS COMORBIDITY PRESENT: ICD-10-CM

## 2024-02-09 NOTE — TELEPHONE ENCOUNTER
PRIOR AUTHORIZATION DENIED    Medication: ZEPBOUND 2.5 MG/0.5ML SC SOAJ  Insurance Company: [employer] - Phone [phone] Fax [phone]  Denial Date: 2/8/2024  Denial Reason(s): Need to try and fail preferred products    Appeal Information:

## 2024-02-20 ENCOUNTER — TELEPHONE (OUTPATIENT)
Dept: ENDOCRINOLOGY | Facility: CLINIC | Age: 54
End: 2024-02-20
Payer: COMMERCIAL

## 2024-02-20 DIAGNOSIS — E66.812 CLASS 2 OBESITY WITH BODY MASS INDEX (BMI) OF 36.0 TO 36.9 IN ADULT, UNSPECIFIED OBESITY TYPE, UNSPECIFIED WHETHER SERIOUS COMORBIDITY PRESENT: Primary | ICD-10-CM

## 2024-02-20 DIAGNOSIS — Z78.0 MENOPAUSE: ICD-10-CM

## 2024-02-20 DIAGNOSIS — E03.9 HYPOTHYROIDISM, UNSPECIFIED TYPE: ICD-10-CM

## 2024-02-20 DIAGNOSIS — F43.21 ADJUSTMENT DISORDER WITH DEPRESSED MOOD: ICD-10-CM

## 2024-02-20 RX ORDER — BUPROPION HYDROCHLORIDE 150 MG/1
150 TABLET ORAL EVERY MORNING
Qty: 90 TABLET | Refills: 0 | Status: SHIPPED | OUTPATIENT
Start: 2024-02-20

## 2024-02-20 RX ORDER — LEVOTHYROXINE SODIUM 50 UG/1
50 TABLET ORAL DAILY
Qty: 90 TABLET | Refills: 0 | Status: SHIPPED | OUTPATIENT
Start: 2024-02-20 | End: 2024-05-20

## 2024-02-20 NOTE — TELEPHONE ENCOUNTER
PA Initiation    Medication: WEGOVY 0.5 MG/0.5ML SC SOAJ  Insurance Company: [facility] [employer] Non-Specialty PA's - Phone [phone] Fax [phone]  [facility] [id number] [place], [place] - [address] [facility] [address]  Filling Pharmacy Phone: [phone]  Filling Pharmacy Fax: [phone]  Start Date: 2/20/2024     Key: [id number]

## 2024-02-20 NOTE — TELEPHONE ENCOUNTER
Last Written Prescription Date:  8/9/23  Last Fill Quantity: 90,  # refills: 1   Last office visit: 8/9/2023    Future Office Visit: Due back 4/24         Requested Prescriptions   Pending Prescriptions Disp Refills    levothyroxine (SYNTHROID/LEVOTHROID) 50 MCG tablet 90 tablet 1     Sig: Take 1 tablet (50 mcg) by mouth daily       Thyroid Protocol Failed - 2/20/2024 11:34 AM        Failed - Normal TSH on file in past 12 months     Recent Labs   Lab Test 02/15/23  0814   TSH 2.36              Passed - Patient is 12 years or older        Passed - Recent (12 mo) or future (30 days) visit within the authorizing provider's specialty     The patient must have completed an in-person or virtual visit within the past 12 months or has a future visit scheduled within the next 90 days with the authorizing provider s specialty.  Urgent care and e-visits do not quality as an office visit for this protocol.          Passed - Medication is active on med list        Passed - No active pregnancy on record     If patient is pregnant or has had a positive pregnancy test, please check TSH.          Passed - No positive pregnancy test in past 12 months     If patient is pregnant or has had a positive pregnancy test, please check TSH.             Refill sent  Mimi Mcleod RN

## 2024-02-20 NOTE — TELEPHONE ENCOUNTER
Mychart msg sent to pt (see mychart msg from 2/9/24) to notify that Wegovy was ordered. Mimi Mcleod RN

## 2024-02-20 NOTE — TELEPHONE ENCOUNTER
Wgt Mgmt team notified regarding letter of appeal.   Mesha Shin RN on 2/22/2024 at 3:35 PM           PRIOR AUTHORIZATION DENIED    Medication: WEGOVY 0.5 MG/0.5ML SC SOAJ  Insurance Company: [facility] [employer] Non-Specialty PA's - Phone [phone] Fax [phone]  Denial Date: 2/20/2024  Denial Reason(s): See denial letter   Appeal Information: See denial letter

## 2024-02-20 NOTE — TELEPHONE ENCOUNTER
Requested Prescriptions   Pending Prescriptions Disp Refills    levothyroxine (SYNTHROID/LEVOTHROID) 50 MCG tablet 90 tablet 1     Sig: Take 1 tablet (50 mcg) by mouth daily       There is no refill protocol information for this order            Last Written Prescription Date:  8/9/2023  Last Fill Quantity: 90 tablet,  # refills: 1   Last office visit: 8/9/2023 ; last virtual visit: Visit date not found with prescribing provider:  Dorothea Zavala MD    Future Office Visit:  None

## 2024-02-20 NOTE — TELEPHONE ENCOUNTER
Medication was changed.  Thank You!  Linda Billings [facility]  Prior Auth Specialist

## 2024-02-23 ENCOUNTER — TELEPHONE (OUTPATIENT)
Dept: ENDOCRINOLOGY | Facility: CLINIC | Age: 54
End: 2024-02-23
Payer: COMMERCIAL

## 2024-02-23 DIAGNOSIS — E66.812 CLASS 2 OBESITY WITH BODY MASS INDEX (BMI) OF 36.0 TO 36.9 IN ADULT, UNSPECIFIED OBESITY TYPE, UNSPECIFIED WHETHER SERIOUS COMORBIDITY PRESENT: Primary | ICD-10-CM

## 2024-02-23 DIAGNOSIS — E66.812 CLASS 2 OBESITY WITH BODY MASS INDEX (BMI) OF 36.0 TO 36.9 IN ADULT, UNSPECIFIED OBESITY TYPE, UNSPECIFIED WHETHER SERIOUS COMORBIDITY PRESENT: ICD-10-CM

## 2024-02-23 NOTE — TELEPHONE ENCOUNTER
Patient call:     Appointment type: New MTM  Provider: Stevie   Return date: next avail   Speciality phone number: [phone]    Additional appointment(s) needed:   Additional notes: Spoke to pt and she stated due to insurance no longer covering [facility] she is starting to transition elsewhere for care. Will not need this visit     Lacey Díaz on 2/23/2024 at 8:30 AM

## 2024-02-23 NOTE — TELEPHONE ENCOUNTER
Dr Lind,  I see that SAXSENDA was approved but I don't see any orders placed. Could you order the saxsenda  and she will be on that until Dr Wolf returns. Angelique Elizondo RN on 2/23/2024 at 1:16 PM

## 2024-05-10 ENCOUNTER — TELEPHONE (OUTPATIENT)
Dept: FAMILY MEDICINE | Facility: CLINIC | Age: 54
End: 2024-05-10
Payer: COMMERCIAL

## 2024-05-20 ENCOUNTER — TELEPHONE (OUTPATIENT)
Dept: ENDOCRINOLOGY | Facility: CLINIC | Age: 54
End: 2024-05-20
Payer: COMMERCIAL

## 2024-05-20 DIAGNOSIS — E03.9 HYPOTHYROIDISM, UNSPECIFIED TYPE: ICD-10-CM

## 2024-05-20 RX ORDER — LEVOTHYROXINE SODIUM 50 UG/1
50 TABLET ORAL DAILY
Qty: 30 TABLET | Refills: 0 | Status: SHIPPED | OUTPATIENT
Start: 2024-05-20

## 2024-05-20 NOTE — TELEPHONE ENCOUNTER
Last Written Prescription Date:  2/20/24  Last Fill Quantity: 90,  # refills: 0   Last office visit: 8/9/2023    Future Office Visit: Was due back Feb-May 2024.           Requested Prescriptions   Pending Prescriptions Disp Refills    levothyroxine (SYNTHROID/LEVOTHROID) 50 MCG tablet [Pharmacy Med Name: LEVOTHYROXINE 50 MCG TABLET] 90 tablet 0     Sig: TAKE 1 TABLET (50 MCG) BY MOUTH DAILY PLEASE MAKE APPT [phone]       Thyroid Protocol Failed - 5/20/2024 12:42 AM        Failed - Normal TSH on file in past 12 months     Recent Labs   Lab Test 02/15/23  0814   TSH 2.36              Passed - Patient is 12 years or older        Passed - Recent (12 mo) or future (30 days) visit within the authorizing provider's specialty     The patient must have completed an in-person or virtual visit within the past 12 months or has a future visit scheduled within the next 90 days with the authorizing provider s specialty.  Urgent care and e-visits do not quality as an office visit for this protocol.          Passed - Medication is active on med list        Passed - Medication indicated for associated diagnosis     Medication is associated with one or more of the following diagnoses:  Hypothyroidism  Thyroid stimulating hormone suppression therapy  Thyroid cancer          Passed - No active pregnancy on record     If patient is pregnant or has had a positive pregnancy test, please check TSH.          Passed - No positive pregnancy test in past 12 months     If patient is pregnant or has had a positive pregnancy test, please check TSH.             1 mo refill sent with [facility] msg sent to pt to make appt for add'l refills. Note to pharmacy as well. Mimi Mcleod RN

## 2024-05-20 NOTE — TELEPHONE ENCOUNTER
PA Initiation    Medication: SAXENDA 18 MG/3ML SC SOPN  Insurance Company: [facility] [employer] Non-Specialty PA's - Phone [phone] Fax [phone]  Pharmacy Filling the Rx: [facility] [address] [place] [address]  Filling Pharmacy Phone: [phone]  Filling Pharmacy Fax: [phone]  Start Date: 5/20/2024     Authorization Expiration Date: 6/4/2024   Key: [id number]    Patient's plan is requesting an updated weight & BMI for the prior auth renewal. Routed to pool.

## 2024-05-21 ENCOUNTER — MYC MEDICAL ADVICE (OUTPATIENT)
Dept: ENDOCRINOLOGY | Facility: CLINIC | Age: 54
End: 2024-05-21
Payer: COMMERCIAL

## 2024-05-21 NOTE — TELEPHONE ENCOUNTER
Called pt and left generic msg on vm that an updated weight is needed for the prior auth of one of her medications.  Asked pt to call us or send mychart msg with weight. Mimi Mcleod RN

## 2024-05-25 ENCOUNTER — HEALTH MAINTENANCE LETTER (OUTPATIENT)
Age: 54
End: 2024-05-25

## 2024-06-04 NOTE — TELEPHONE ENCOUNTER
PRIOR AUTHORIZATION DENIED    Medication: SAXENDA 18 MG/3ML SC SOPN  Insurance Company: [employer] Non-Specialty PA's - Phone [phone] Fax [phone]  Denial Date: 5/31/2024  Denial Reason(s):   Appeal Information:   Patient Notified:

## 2024-07-08 NOTE — TELEPHONE ENCOUNTER
Patient Quality Outreach    Patient is due for the following:   Colon Cancer Screening  Breast Cancer Screening - Mammogram    Next Steps:   No follow up needed at this time.    Type of outreach:    Sent letter.      Questions for provider review:    None           Maria R Brumfield MA

## 2024-07-23 DIAGNOSIS — E66.812 CLASS 2 OBESITY WITH BODY MASS INDEX (BMI) OF 36.0 TO 36.9 IN ADULT, UNSPECIFIED OBESITY TYPE, UNSPECIFIED WHETHER SERIOUS COMORBIDITY PRESENT: ICD-10-CM

## 2025-04-12 ENCOUNTER — HEALTH MAINTENANCE LETTER (OUTPATIENT)
Age: 55
End: 2025-04-12

## 2025-06-14 ENCOUNTER — HEALTH MAINTENANCE LETTER (OUTPATIENT)
Age: 55
End: 2025-06-14

## 2025-07-05 NOTE — NURSING NOTE
Patient's measurements today were:    Neck = 13 in    Waist = 35 in    Hips = 40 in     Assistance OOB with selected safe patient handling equipment if applicable/Assistance with ambulation/Communicate risk of Fall with Harm to all staff, patient, and family/Provide visual cue: red socks, yellow wristband, yellow gown, etc/Reinforce activity limits and safety measures with patient and family/Bed in lowest position, wheels locked, appropriate side rails in place/Call bell, personal items and telephone in reach/Instruct patient to call for assistance before getting out of bed/chair/stretcher/Non-slip footwear applied when patient is off stretcher/[place] to call system/Physically safe environment - no spills, clutter or unnecessary equipment/Purposeful Proactive Rounding/Room/bathroom lighting operational, light cord in reach

## (undated) DEVICE — LINEN DRAPE 54X72" 5467

## (undated) DEVICE — GOWN IMPERVIOUS SPECIALTY XLG/XLONG 32474

## (undated) DEVICE — EVAC SYSTEM CLEAR FLOW SC082500

## (undated) DEVICE — SUCTION CANISTER BEMIS HI FLOW 006772-901

## (undated) DEVICE — SOL NACL 0.9% IRRIG 3000ML BAG 2B7477

## (undated) DEVICE — SU VICRYL 2-0 SH 27" J317H

## (undated) DEVICE — SU PDS II 2-0 SH 27" Z317H

## (undated) DEVICE — ESU GROUND PAD ADULT W/CORD E7507

## (undated) DEVICE — DAVINCI HOT SHEARS TIP COVER  400180

## (undated) DEVICE — LINEN ORTHO ACL PACK 5447

## (undated) DEVICE — SU VICRYL 4-0 PS-2 18" UND J496H

## (undated) DEVICE — ESU CORD BIPOLAR GREEN 10-4000

## (undated) DEVICE — SU WND CLOSURE VLOC 180 ABS 0 9" GS-21 VLOCL0346

## (undated) DEVICE — SU WND CLOSURE VLOC 90 ABS 3-0 VIOLET 6" CV-23 VLOCM0804

## (undated) DEVICE — PACK LAP HYST RIDGES

## (undated) DEVICE — DAVINCI SI DRAPE ACCESSORY KIT 3-ARM 420290

## (undated) DEVICE — DEVICE SUTURE GRASPER TROCAR CLOSURE 14GA PMITCSG

## (undated) DEVICE — LIGHT HANDLE X2

## (undated) DEVICE — GLOVE PROTEXIS MICRO 8.0  2D73PM80

## (undated) DEVICE — DAVINCI S CANNULA SEAL 8.5-13MM 420206

## (undated) DEVICE — ENDO TROCAR SLEEVE KII Z-THREADED 05X100MM CTS02

## (undated) DEVICE — LINEN ORTHO PACK 5446

## (undated) DEVICE — DEVICE TISSUE REMOVAL MYOSURE 10-403/ SGL PKG 10-401

## (undated) DEVICE — NDL 22GA 1.5"

## (undated) DEVICE — SUCTION CANISTER MEDIVAC LINER 3000ML W/LID 65651-530

## (undated) DEVICE — SYR 03ML LL W/O NDL

## (undated) DEVICE — SU VICRYL 0 CT-2 27" J334H

## (undated) DEVICE — LINEN HALF SHEET 5512

## (undated) DEVICE — ESU PENCIL W/HOLSTER E2350H

## (undated) DEVICE — ESU CORD MONOPOLAR 10'  E0510

## (undated) DEVICE — PAD CHUX UNDERPAD 30X36" P3036C

## (undated) DEVICE — DAVINCI S GRASPER ENDOWRIST PROGRASP 420093

## (undated) DEVICE — KIT PATIENT POSITIONING PIGAZZI LATEX FREE 40580

## (undated) DEVICE — SOL NACL 0.9% IRRIG 1000ML BOTTLE 2F7124

## (undated) DEVICE — SEAL SET MYOSURE ROD LENS SCOPE SINGLE USE 40-902

## (undated) DEVICE — SU VICRYL 0 UR-6 27" J603H

## (undated) DEVICE — SOL WATER IRRIG 1000ML BOTTLE 07139-09

## (undated) DEVICE — GLOVE PROTEXIS MICRO 7.5  2D73PM75

## (undated) DEVICE — LINEN FULL SHEET 5511

## (undated) DEVICE — DAVINCI S NDL DRIVER MEGA 420194

## (undated) DEVICE — ENDO TROCAR SLEEVE KII ADV FIXATION 05X100MM CFS02

## (undated) DEVICE — CATH TRAY FOLEY SURESTEP 16FR DRAIN BAG STATOCK A899916

## (undated) DEVICE — ENDO TROCAR FIRST ENTRY KII FIOS Z-THRD 12X100MM CTF73

## (undated) DEVICE — DAVINCI S MONOPOLAR SCISSORS PRECURVED HOT SHEARS 420179

## (undated) DEVICE — GLOVE PROTEXIS POWDER FREE 8.0 ORTHOPEDIC 2D73ET80

## (undated) DEVICE — BAG CLEAR TRASH 1.3M 39X33" P4040C

## (undated) DEVICE — ESU LIGASURE LAPAROSCOPIC BLUNT TIP SEALER 5MMX37CM LF1837

## (undated) DEVICE — SYR 10ML SLIP TIP W/O NDL 303134

## (undated) DEVICE — ESU ELEC BLADE 2.75" COATED/INSULATED E1455

## (undated) DEVICE — PACK SET-UP STD 9102

## (undated) DEVICE — GLOVE PROTEXIS BLUE W/NEU-THERA 8.0  2D73EB80

## (undated) DEVICE — GOWN XLG DISP 9545

## (undated) DEVICE — SU DERMABOND MINI DHVM12

## (undated) DEVICE — Device

## (undated) DEVICE — APPLICATORS COTTON TIP 6" X2

## (undated) DEVICE — BLADE KNIFE SURG 15 371115

## (undated) DEVICE — PROTECTOR ARM ONE-STEP TRENDELENBURG 40418

## (undated) DEVICE — SPONGE LAP 18X18" X8435

## (undated) DEVICE — SYSTEM CLEARIFY VISUALIZATION 21-345

## (undated) DEVICE — GLOVE ESTEEM POWDER FREE SMT 7.5  2D72PT75

## (undated) DEVICE — SOL WATER IRRIG 1000ML BOTTLE 2F7114

## (undated) DEVICE — ESU HARMONIC ACE LAP SHEARS STRYKER ACE+ 7 5MMX36CM HARH36

## (undated) DEVICE — SOL NACL 0.9% INJ 1000ML BAG 2B1324X

## (undated) DEVICE — BASIN SET MINOR DISP

## (undated) DEVICE — ENDO CANNULA 05MM VERSAONE UNIVERSAL UNVCA5STF

## (undated) DEVICE — SOL NACL 0.9% IRRIG 1000ML BOTTLE 07138-09

## (undated) DEVICE — SU VICRYL 0 CT-1 36" J346H

## (undated) DEVICE — LINEN POUCH DBL 5427

## (undated) DEVICE — PREP CHLORAPREP 26ML TINTED ORANGE  260815

## (undated) DEVICE — NDL INSUFFLATION 13GA 120MM C2201

## (undated) DEVICE — ENDO SEAL SCOPE SELF SEAL 1.2MM 26153EAU

## (undated) DEVICE — GLOVE PROTEXIS MICRO 7.0  2D73PM70

## (undated) DEVICE — PACK MINOR LITHOTOMY RIDGES

## (undated) DEVICE — DRAPE POUCH IRR 1016

## (undated) DEVICE — DAVINCI OBTURATOR 8MM BLADELESS 420023

## (undated) DEVICE — CATH INTERMITTENT CLEAN-CATH FEMALE 14FR 6" VINYL LF 420614

## (undated) DEVICE — SU MONOCRYL 4-0 PS-2 27" UND Y426H

## (undated) DEVICE — ENDO TROCAR OPTICAL 05MM VERSAPORT PLUS W/FIX CAN ONB5STF

## (undated) DEVICE — RETR ELEV / UTERINE MANIPULATOR V-CARE LG CUP 60-6085-202A

## (undated) RX ORDER — ONDANSETRON 2 MG/ML
INJECTION INTRAMUSCULAR; INTRAVENOUS
Status: DISPENSED
Start: 2018-11-20

## (undated) RX ORDER — FENTANYL CITRATE 50 UG/ML
INJECTION, SOLUTION INTRAMUSCULAR; INTRAVENOUS
Status: DISPENSED
Start: 2018-11-20

## (undated) RX ORDER — BUPIVACAINE HYDROCHLORIDE 2.5 MG/ML
INJECTION, SOLUTION EPIDURAL; INFILTRATION; INTRACAUDAL
Status: DISPENSED
Start: 2018-11-20

## (undated) RX ORDER — NEOSTIGMINE METHYLSULFATE 5 MG/5 ML
SYRINGE (ML) INTRAVENOUS
Status: DISPENSED
Start: 2017-08-15

## (undated) RX ORDER — NEOSTIGMINE METHYLSULFATE 1 MG/ML
VIAL (ML) INJECTION
Status: DISPENSED
Start: 2018-11-20

## (undated) RX ORDER — PROPOFOL 10 MG/ML
INJECTION, EMULSION INTRAVENOUS
Status: DISPENSED
Start: 2017-08-15

## (undated) RX ORDER — ONDANSETRON 2 MG/ML
INJECTION INTRAMUSCULAR; INTRAVENOUS
Status: DISPENSED
Start: 2017-11-03

## (undated) RX ORDER — KETOROLAC TROMETHAMINE 30 MG/ML
INJECTION, SOLUTION INTRAMUSCULAR; INTRAVENOUS
Status: DISPENSED
Start: 2017-08-15

## (undated) RX ORDER — FENTANYL CITRATE 50 UG/ML
INJECTION, SOLUTION INTRAMUSCULAR; INTRAVENOUS
Status: DISPENSED
Start: 2017-08-15

## (undated) RX ORDER — ONDANSETRON 2 MG/ML
INJECTION INTRAMUSCULAR; INTRAVENOUS
Status: DISPENSED
Start: 2017-08-15

## (undated) RX ORDER — CEFAZOLIN SODIUM 2 G/100ML
INJECTION, SOLUTION INTRAVENOUS
Status: DISPENSED
Start: 2017-08-15

## (undated) RX ORDER — GLYCOPYRROLATE 0.2 MG/ML
INJECTION INTRAMUSCULAR; INTRAVENOUS
Status: DISPENSED
Start: 2017-11-03

## (undated) RX ORDER — GLYCOPYRROLATE 0.2 MG/ML
INJECTION INTRAMUSCULAR; INTRAVENOUS
Status: DISPENSED
Start: 2017-08-15

## (undated) RX ORDER — PROPOFOL 10 MG/ML
INJECTION, EMULSION INTRAVENOUS
Status: DISPENSED
Start: 2018-11-20

## (undated) RX ORDER — BUPIVACAINE HYDROCHLORIDE AND EPINEPHRINE 5; 5 MG/ML; UG/ML
INJECTION, SOLUTION EPIDURAL; INTRACAUDAL; PERINEURAL
Status: DISPENSED
Start: 2017-08-15

## (undated) RX ORDER — LIDOCAINE HYDROCHLORIDE 10 MG/ML
INJECTION, SOLUTION EPIDURAL; INFILTRATION; INTRACAUDAL; PERINEURAL
Status: DISPENSED
Start: 2018-11-20

## (undated) RX ORDER — FENTANYL CITRATE 50 UG/ML
INJECTION, SOLUTION INTRAMUSCULAR; INTRAVENOUS
Status: DISPENSED
Start: 2017-11-03

## (undated) RX ORDER — PHENAZOPYRIDINE HYDROCHLORIDE 200 MG/1
TABLET, FILM COATED ORAL
Status: DISPENSED
Start: 2018-11-20

## (undated) RX ORDER — PHENAZOPYRIDINE HYDROCHLORIDE 200 MG/1
TABLET, FILM COATED ORAL
Status: DISPENSED
Start: 2017-11-03

## (undated) RX ORDER — DEXAMETHASONE SODIUM PHOSPHATE 4 MG/ML
INJECTION, SOLUTION INTRA-ARTICULAR; INTRALESIONAL; INTRAMUSCULAR; INTRAVENOUS; SOFT TISSUE
Status: DISPENSED
Start: 2018-11-20

## (undated) RX ORDER — DEXAMETHASONE SODIUM PHOSPHATE 4 MG/ML
INJECTION, SOLUTION INTRA-ARTICULAR; INTRALESIONAL; INTRAMUSCULAR; INTRAVENOUS; SOFT TISSUE
Status: DISPENSED
Start: 2017-11-03

## (undated) RX ORDER — OXYCODONE HYDROCHLORIDE 5 MG/1
TABLET ORAL
Status: DISPENSED
Start: 2017-11-03

## (undated) RX ORDER — LIDOCAINE HYDROCHLORIDE 10 MG/ML
INJECTION, SOLUTION EPIDURAL; INFILTRATION; INTRACAUDAL; PERINEURAL
Status: DISPENSED
Start: 2017-11-03

## (undated) RX ORDER — LIDOCAINE HYDROCHLORIDE 10 MG/ML
INJECTION, SOLUTION EPIDURAL; INFILTRATION; INTRACAUDAL; PERINEURAL
Status: DISPENSED
Start: 2017-08-15

## (undated) RX ORDER — DEXAMETHASONE SODIUM PHOSPHATE 4 MG/ML
INJECTION, SOLUTION INTRA-ARTICULAR; INTRALESIONAL; INTRAMUSCULAR; INTRAVENOUS; SOFT TISSUE
Status: DISPENSED
Start: 2017-08-15

## (undated) RX ORDER — EPHEDRINE SULFATE 50 MG/ML
INJECTION, SOLUTION INTRAMUSCULAR; INTRAVENOUS; SUBCUTANEOUS
Status: DISPENSED
Start: 2017-11-03

## (undated) RX ORDER — BUPIVACAINE HYDROCHLORIDE AND EPINEPHRINE 5; 5 MG/ML; UG/ML
INJECTION, SOLUTION EPIDURAL; INTRACAUDAL; PERINEURAL
Status: DISPENSED
Start: 2018-11-20

## (undated) RX ORDER — CEFAZOLIN SODIUM 2 G/100ML
INJECTION, SOLUTION INTRAVENOUS
Status: DISPENSED
Start: 2017-11-03

## (undated) RX ORDER — CEFAZOLIN SODIUM 1 G/3ML
INJECTION, POWDER, FOR SOLUTION INTRAMUSCULAR; INTRAVENOUS
Status: DISPENSED
Start: 2017-08-15

## (undated) RX ORDER — SCOLOPAMINE TRANSDERMAL SYSTEM 1 MG/1
PATCH, EXTENDED RELEASE TRANSDERMAL
Status: DISPENSED
Start: 2018-11-20

## (undated) RX ORDER — CEFAZOLIN SODIUM 2 G/100ML
INJECTION, SOLUTION INTRAVENOUS
Status: DISPENSED
Start: 2018-11-20

## (undated) RX ORDER — GLYCOPYRROLATE 0.2 MG/ML
INJECTION INTRAMUSCULAR; INTRAVENOUS
Status: DISPENSED
Start: 2018-11-20

## (undated) RX ORDER — PROPOFOL 10 MG/ML
INJECTION, EMULSION INTRAVENOUS
Status: DISPENSED
Start: 2017-11-03

## (undated) RX ORDER — ACETAMINOPHEN 500 MG
TABLET ORAL
Status: DISPENSED
Start: 2017-08-15

## (undated) RX ORDER — FUROSEMIDE 10 MG/ML
INJECTION INTRAMUSCULAR; INTRAVENOUS
Status: DISPENSED
Start: 2018-11-20

## (undated) RX ORDER — OXYCODONE HYDROCHLORIDE 5 MG/1
TABLET ORAL
Status: DISPENSED
Start: 2017-08-15

## (undated) RX ORDER — CEFAZOLIN SODIUM 1 G/3ML
INJECTION, POWDER, FOR SOLUTION INTRAMUSCULAR; INTRAVENOUS
Status: DISPENSED
Start: 2018-11-20

## (undated) RX ORDER — NEOSTIGMINE METHYLSULFATE 1 MG/ML
VIAL (ML) INJECTION
Status: DISPENSED
Start: 2017-11-03